# Patient Record
Sex: MALE | Race: WHITE | NOT HISPANIC OR LATINO | Employment: UNEMPLOYED | URBAN - METROPOLITAN AREA
[De-identification: names, ages, dates, MRNs, and addresses within clinical notes are randomized per-mention and may not be internally consistent; named-entity substitution may affect disease eponyms.]

---

## 2023-09-04 PROBLEM — R51.9 HEADACHE: Status: ACTIVE | Noted: 2023-09-04

## 2023-09-04 PROBLEM — L73.9 FOLLICULITIS: Status: ACTIVE | Noted: 2023-09-04

## 2023-09-04 PROBLEM — L03.90 CELLULITIS: Status: ACTIVE | Noted: 2023-09-04

## 2023-09-04 PROBLEM — K40.90 RIGHT INGUINAL HERNIA: Status: ACTIVE | Noted: 2023-09-04

## 2023-09-04 PROBLEM — M25.551 RIGHT HIP PAIN: Status: ACTIVE | Noted: 2023-09-04

## 2023-09-04 PROBLEM — E89.0 POSTPROCEDURAL HYPOTHYROIDISM: Status: ACTIVE | Noted: 2023-09-04

## 2023-09-04 PROBLEM — I50.9 CONGESTIVE HEART FAILURE (MULTI): Status: ACTIVE | Noted: 2023-09-04

## 2023-09-04 PROBLEM — F41.1 GENERALIZED ANXIETY DISORDER: Status: ACTIVE | Noted: 2023-09-04

## 2023-09-04 PROBLEM — F10.929 ALCOHOL INTOXICATION (CMS-HCC): Status: ACTIVE | Noted: 2023-09-04

## 2023-09-04 PROBLEM — G47.00 INSOMNIA: Status: ACTIVE | Noted: 2023-09-04

## 2023-09-04 PROBLEM — E03.9 HYPOTHYROIDISM: Status: ACTIVE | Noted: 2023-09-04

## 2023-09-04 PROBLEM — I10 HYPERTENSION: Status: ACTIVE | Noted: 2023-09-04

## 2023-09-04 PROBLEM — M54.12 RIGHT CERVICAL RADICULOPATHY: Status: ACTIVE | Noted: 2023-09-04

## 2023-09-04 PROBLEM — F41.9 ANXIETY: Status: ACTIVE | Noted: 2023-09-04

## 2023-09-04 PROBLEM — R10.9 ABDOMINAL PAIN: Status: ACTIVE | Noted: 2023-09-04

## 2023-09-04 PROBLEM — F17.200 TOBACCO DEPENDENCE SYNDROME: Status: ACTIVE | Noted: 2023-09-04

## 2023-09-04 PROBLEM — M54.50 CHRONIC LOWER BACK PAIN: Status: ACTIVE | Noted: 2023-09-04

## 2023-09-04 PROBLEM — G89.29 CHRONIC PAIN: Status: ACTIVE | Noted: 2023-09-04

## 2023-09-04 PROBLEM — I10 BENIGN ESSENTIAL HYPERTENSION: Status: ACTIVE | Noted: 2023-09-04

## 2023-09-04 PROBLEM — K52.9 COLITIS: Status: ACTIVE | Noted: 2023-09-04

## 2023-09-04 PROBLEM — R40.1 CLOUDED CONSCIOUSNESS: Status: ACTIVE | Noted: 2023-09-04

## 2023-09-04 PROBLEM — G89.29 CHRONIC LOWER BACK PAIN: Status: ACTIVE | Noted: 2023-09-04

## 2023-09-04 PROBLEM — C73 THYROID CARCINOMA (MULTI): Status: ACTIVE | Noted: 2023-09-04

## 2023-09-04 PROBLEM — F10.10 ETOH ABUSE: Status: ACTIVE | Noted: 2023-09-04

## 2023-09-04 PROBLEM — L03.119 CELLULITIS OF LOWER LEG: Status: ACTIVE | Noted: 2023-09-04

## 2023-09-04 PROBLEM — I50.22 CHRONIC SYSTOLIC HEART FAILURE (MULTI): Status: ACTIVE | Noted: 2023-09-04

## 2023-09-04 PROBLEM — F32.A DEPRESSION: Status: ACTIVE | Noted: 2023-09-04

## 2023-09-04 PROBLEM — I42.8 NONISCHEMIC CARDIOMYOPATHY (MULTI): Status: ACTIVE | Noted: 2023-09-04

## 2023-09-04 RX ORDER — POTASSIUM CHLORIDE 750 MG/1
1 TABLET, FILM COATED, EXTENDED RELEASE ORAL 2 TIMES DAILY
COMMUNITY
End: 2023-10-17 | Stop reason: SDUPTHER

## 2023-09-04 RX ORDER — METOPROLOL TARTRATE 50 MG/1
1 TABLET ORAL EVERY 12 HOURS
COMMUNITY
Start: 2012-09-30 | End: 2023-10-17 | Stop reason: WASHOUT

## 2023-09-04 RX ORDER — LEVOTHYROXINE SODIUM 150 UG/1
1 TABLET ORAL
COMMUNITY
End: 2023-10-18 | Stop reason: SDUPTHER

## 2023-09-04 RX ORDER — SERTRALINE HYDROCHLORIDE 50 MG/1
1 TABLET, FILM COATED ORAL DAILY
COMMUNITY
Start: 2018-10-16 | End: 2023-10-17 | Stop reason: WASHOUT

## 2023-09-04 RX ORDER — CARVEDILOL 3.12 MG/1
1 TABLET ORAL
COMMUNITY
End: 2023-10-17 | Stop reason: WASHOUT

## 2023-09-04 RX ORDER — VENLAFAXINE HYDROCHLORIDE 37.5 MG/1
1 CAPSULE, EXTENDED RELEASE ORAL DAILY
COMMUNITY
Start: 2023-04-06 | End: 2023-10-17 | Stop reason: SDUPTHER

## 2023-09-04 RX ORDER — LISINOPRIL 20 MG/1
2 TABLET ORAL DAILY
COMMUNITY
Start: 2014-09-26 | End: 2023-10-17 | Stop reason: WASHOUT

## 2023-09-04 RX ORDER — HYDROCHLOROTHIAZIDE 25 MG/1
1 TABLET ORAL DAILY
COMMUNITY
Start: 2016-10-25 | End: 2023-10-17 | Stop reason: WASHOUT

## 2023-09-04 RX ORDER — ACETAMINOPHEN 500 MG
1 TABLET ORAL DAILY
COMMUNITY

## 2023-09-04 RX ORDER — ACETAMINOPHEN, DIPHENHYDRAMINE HCL, PHENYLEPHRINE HCL 325; 25; 5 MG/1; MG/1; MG/1
TABLET ORAL NIGHTLY
COMMUNITY
End: 2023-12-21 | Stop reason: WASHOUT

## 2023-09-04 RX ORDER — FUROSEMIDE 40 MG/1
1 TABLET ORAL DAILY
COMMUNITY
End: 2023-10-17 | Stop reason: SDUPTHER

## 2023-09-04 RX ORDER — SACUBITRIL AND VALSARTAN 24; 26 MG/1; MG/1
1 TABLET, FILM COATED ORAL 2 TIMES DAILY
COMMUNITY
End: 2023-10-17 | Stop reason: WASHOUT

## 2023-09-04 RX ORDER — MULTIVITAMIN
1 TABLET ORAL DAILY
COMMUNITY
End: 2023-12-21 | Stop reason: WASHOUT

## 2023-10-05 ENCOUNTER — PHARMACY VISIT (OUTPATIENT)
Dept: PHARMACY | Facility: CLINIC | Age: 58
End: 2023-10-05
Payer: MEDICAID

## 2023-10-05 PROCEDURE — RXMED WILLOW AMBULATORY MEDICATION CHARGE

## 2023-10-05 RX ORDER — ALPRAZOLAM 0.25 MG/1
TABLET ORAL
Qty: 14 TABLET | Refills: 0 | OUTPATIENT
Start: 2023-10-05 | End: 2023-10-17 | Stop reason: SDUPTHER

## 2023-10-05 RX ORDER — VENLAFAXINE HYDROCHLORIDE 75 MG/1
CAPSULE, EXTENDED RELEASE ORAL
Qty: 30 CAPSULE | Refills: 2 | OUTPATIENT
Start: 2023-10-05 | End: 2023-10-17 | Stop reason: SDUPTHER

## 2023-10-05 RX ORDER — VENLAFAXINE HYDROCHLORIDE 37.5 MG/1
CAPSULE, EXTENDED RELEASE ORAL
Qty: 30 CAPSULE | Refills: 2 | OUTPATIENT
Start: 2023-10-05 | End: 2023-10-17 | Stop reason: WASHOUT

## 2023-10-05 RX ORDER — ALPRAZOLAM 0.5 MG/1
TABLET ORAL
Qty: 90 TABLET | Refills: 0 | OUTPATIENT
Start: 2023-10-05 | End: 2023-10-17 | Stop reason: SDUPTHER

## 2023-10-09 ENCOUNTER — PHARMACY VISIT (OUTPATIENT)
Dept: PHARMACY | Facility: CLINIC | Age: 58
End: 2023-10-09
Payer: MEDICAID

## 2023-10-09 PROCEDURE — RXMED WILLOW AMBULATORY MEDICATION CHARGE

## 2023-10-16 NOTE — ASSESSMENT & PLAN NOTE
- We will check thyroid level blood work ordered today  -Continue on current regimen pending blood work results to which we will make any adjustments if warranted

## 2023-10-16 NOTE — ASSESSMENT & PLAN NOTE
>>ASSESSMENT AND PLAN FOR HYPOTHYROIDISM WRITTEN ON 10/16/2023  7:57 AM BY SANTOS POWELL DO    - We will check thyroid level blood work ordered today  -Continue on current regimen pending blood work results to which we will make any adjustments if warranted

## 2023-10-16 NOTE — ASSESSMENT & PLAN NOTE
>>ASSESSMENT AND PLAN FOR BENIGN ESSENTIAL HYPERTENSION WRITTEN ON 10/16/2023  7:58 AM BY SANTOS POWELL DO    - Blood pressure stable in office today

## 2023-10-16 NOTE — PROGRESS NOTES
Outpatient Visit Note    Chief Complaint   Patient presents with    Thyroid Problem     Check thyroid. Pt would like flu shot.         HPI:  Todd Small is a 58 y.o. male with a complex past medical history significant for CHF, nonischemic cardiomyopathy, hypertension, alcohol abuse, anxiety and postprocedural hypothyroidism who presents to the office for thyroid follow-up.  He was last seen in the office on 8/17/2023 secondary to concerns regarding ear infection.     In review, at last encounter he reported 3 weeks of severe left-sided ear pain. Was in [place] over the last month to which he did present to a local physician for evaluation on 8/5/2023. Was ultimately started on regimen of Augmentin and Ciprodex drops. States that symptoms seem to momentarily improve though upon completing prescriptions within the last week, continues to have moderate to severe pain extending from ear to middle area of his forehead. Denies any active ear swelling or discharge though pain remains extremely prominent and sensitive to touch. Did have moderate dizziness which has slightly improved. Denies any falls or syncope. Has had mild postnasal drainage with some coughing and green production. Denies any fever, chills. Has had decreased appetite though denies nausea, vomiting or diarrhea. Patient has been struggling with the pain which has created significant irritability/sleep disruption. Has been attempting to optimize Tylenol without significant relief. Has used opioid pain relievers in the past stating that tramadol does not have any effect. Does have leftover prescription for oxycodone though has been worried about this being too potent as he does take chronic alprazolam through psychiatry. Was interested in alternative medication in small quantity of possible.  Ultimately patient was started on doxycycline and Medrol pack with small prescription of as needed hydrocodone given along with ENT referral.  He has  had to have ENT follow-up in the near future.  Has cleaned significant amounts of wax otherwise here denying any active hearing issues or recurrent pain issues.  Even with symptoms resolving, would like to have formal evaluation with specialist.    In review of chart, patient was last seen by Dr. Bush of cardiology in June 2023 to which he is scheduled for routine follow-up in December.    Acquired hypothyroidism:  Last thyroid level checked in February which was stable on current regimen of Synthroid 150 mcg Monday through Saturday with 1.5 tablets on Sunday.  He reports compliance with medication regimen denying any adverse side effects.  No significant complaints of palpitations, fatigue, mood fluctuations or hair/skin/nail concerns.  As noted steady weight gain over the last several months, leaving him suspicious of underactive thyroid.    Of note, patient has had good response to topical Voltaren gel for musculoskeletal aches and pains.  Has been recently dealing with low back aggravation to which she would like to utilize this product though prescription refill as needed.        Current Medications  Current Outpatient Medications   Medication Instructions    ALPRAZolam (Xanax) 0.25 mg tablet TAKE 1 TABLET BY MOUTH EVERY EVENING FOR BREAKTHROUGH ANXIETY DURING TRAVELING    ALPRAZolam (Xanax) 0.5 mg tablet TAKE 1 TABLET BY MOUTH THREE TIMES A DAY FOR ANXIETY AND SLEEP DISTURBANCE    carvedilol (Coreg) 12.5 mg tablet TAKE 1 TABLET BY MOUTH TWO TIMES A DAY WITH FOOD    cetirizine (ZyrTEC) 10 mg tablet TAKE 1 TABLET BY MOUTH ONE TIME DAILY    cholecalciferol (Vitamin D-3) 50 mcg (2,000 unit) capsule 1 capsule, oral, Daily    diclofenac sodium (Voltaren) 1 % gel gel 1 Application, Topical, 4 times daily PRN    diclofenac sodium 1 % kit 1 Application, Topical, Every 12 hours, for 30 day(s)    furosemide (Lasix) 40 mg tablet TAKE ONE TABLET BY MOUTH ONCE A DAY    hydroCHLOROthiazide (HYDRODiuril) 25 mg tablet 1  tablet, oral, Daily    levothyroxine (Synthroid) 150 mcg tablet 1 tablet, oral, MONDAY through SATURDAY, and take 1 AND 1/2 tablets on SUNDAY Orally as directed<BR>    lisinopril 20 mg tablet 2 tablets, oral, Daily    melatonin 10 mg tablet oral, Nightly, As directed    methylPREDNISolone (Medrol Dospak) 4 mg tablets TAKE 6 TABLETS BY MOUTH ON DAY #1, 5 TABLETS ON DAY #2, 4 TABLETS ON DAY #3, 3 TABLETS ON DAY #4, 2 TABLETS ON DAY #5, 1 TABLET ON DAY #6, THEN STOP    metoprolol tartrate (Lopressor) 50 mg tablet 1 tablet, oral, Every 12 hours    multivitamin (Multiple Vitamins) tablet 1 tablet, oral, Daily    potassium chloride CR (Klor-Con) 10 mEq ER tablet TAKE ONE TABLET BY MOUTH WITH FOOD TWICE A DAY    sacubitriL-valsartan (Entresto) 24-26 mg tablet 1 tablet, oral, 2 times daily    sacubitriL-valsartan (Entresto) 49-51 mg tablet TAKE 1 TABLET BY MOUTH TWICE DAILLY    sertraline (Zoloft) 50 mg tablet 1 tablet, oral, Daily, As directed    venlafaxine XR (Effexor-XR) 75 mg 24 hr capsule TAKE 1 CAPSULE BY MOUTH ONE TIME DAILY        Allergies  Allergies   Allergen Reactions    Latex Rash    Sulfa (Sulfonamide Antibiotics) Rash and Unknown        Past Medical History:   Diagnosis Date    Anxiety 4/01/23    Cancer (CMS/[facility]) 10/01/0-    CHF (congestive heart failure) (CMS/[facility]) 10/01/23    Depression 10/01/23      Past Surgical History:   Procedure Laterality Date    APPENDECTOMY  10/01/96     Family History   Problem Relation Name Age of Onset    Heart disease Mother      Heart disease Sister (1)     Other (pacemaker) Sister (1)     Heart disease Brother      Atrial fibrillation Brother       Social History     Tobacco Use    Smoking status: Former     Packs/day: 1.00     Years: 15.00     Additional pack years: 0.00     Total pack years: 15.00     Types: Cigarettes    Smokeless tobacco: Never   Substance Use Topics    Alcohol use: Not Currently    Drug use: Never       ROS  All pertinent positive symptoms are included  in the history of present illness.  All other systems have been reviewed and are negative and noncontributory to this patient's current ailments.    VITAL SIGNS  Vitals:    10/17/23 0951   BP: 126/68   Pulse: 79   Temp: 36.9 °C (98.4 °F)   SpO2: 97%       PHYSICAL EXAM  GENERAL APPEARANCE: alert and oriented, Pleasant and cooperative, No Acute Distress  HEENT: EOMI, PERRLA, MMM, right external auditory canal free from any significant wax obstruction  HEART: RRR, normal S1S2, no murmurs, click or rubs  LUNGS: clear to auscultation bilaterally, no wheezes/rhonchi/rales  EXTREMITIES: no edema, normal ROM  SKIN: normal, no rash, unremarkable  NEUROLOGIC EXAM: non-focal exam  MUSCULOSKELETAL: no gross abnormalities  PSYCH: affect is normal, eye contact is good    Assessment/Plan   Problem List Items Addressed This Visit             ICD-10-CM    Chronic systolic heart failure (CMS/HCC) I50.22     - Continue with plans for routine cardiology follow-up in December         Benign essential hypertension I10     - Blood pressure stable in office today         Relevant Orders    Comprehensive metabolic panel (Completed)    Tsh With Reflex To Free T4 If Abnormal (Completed)    Lipid panel (Completed)    Chronic lower back pain M54.50, G89.29    Relevant Medications    diclofenac sodium (Voltaren) 1 % gel gel    Hypothyroidism - Primary E03.9     - We will check thyroid level blood work ordered today  -Continue on current regimen pending blood work results to which we will make any adjustments if warranted         Relevant Orders    Comprehensive metabolic panel (Completed)    Tsh With Reflex To Free T4 If Abnormal (Completed)    Lipid panel (Completed)     Other Visit Diagnoses         Codes    Encounter for immunization     Z23    Relevant Orders    Flu vaccine (IIV4) age 6 months and greater, preservative free (Completed)

## 2023-10-17 ENCOUNTER — LAB (OUTPATIENT)
Dept: LAB | Facility: LAB | Age: 58
End: 2023-10-17
Payer: COMMERCIAL

## 2023-10-17 ENCOUNTER — OFFICE VISIT (OUTPATIENT)
Dept: PRIMARY CARE | Facility: CLINIC | Age: 58
End: 2023-10-17
Payer: COMMERCIAL

## 2023-10-17 ENCOUNTER — PHARMACY VISIT (OUTPATIENT)
Dept: PHARMACY | Facility: CLINIC | Age: 58
End: 2023-10-17
Payer: MEDICAID

## 2023-10-17 VITALS
SYSTOLIC BLOOD PRESSURE: 126 MMHG | HEART RATE: 79 BPM | BODY MASS INDEX: 30.74 KG/M2 | TEMPERATURE: 98.4 F | HEIGHT: 71 IN | OXYGEN SATURATION: 97 % | WEIGHT: 219.6 LBS | DIASTOLIC BLOOD PRESSURE: 68 MMHG

## 2023-10-17 DIAGNOSIS — G89.29 CHRONIC BILATERAL LOW BACK PAIN, UNSPECIFIED WHETHER SCIATICA PRESENT: ICD-10-CM

## 2023-10-17 DIAGNOSIS — Z23 ENCOUNTER FOR IMMUNIZATION: ICD-10-CM

## 2023-10-17 DIAGNOSIS — E03.9 ACQUIRED HYPOTHYROIDISM: Primary | ICD-10-CM

## 2023-10-17 DIAGNOSIS — I50.22 CHRONIC SYSTOLIC HEART FAILURE (MULTI): ICD-10-CM

## 2023-10-17 DIAGNOSIS — M54.50 CHRONIC BILATERAL LOW BACK PAIN, UNSPECIFIED WHETHER SCIATICA PRESENT: ICD-10-CM

## 2023-10-17 DIAGNOSIS — E03.9 ACQUIRED HYPOTHYROIDISM: ICD-10-CM

## 2023-10-17 DIAGNOSIS — I10 BENIGN ESSENTIAL HYPERTENSION: ICD-10-CM

## 2023-10-17 LAB
ALBUMIN SERPL-MCNC: 4.2 G/DL (ref 3.5–5)
ALP BLD-CCNC: 49 U/L (ref 35–125)
ALT SERPL-CCNC: 13 U/L (ref 5–40)
ANION GAP SERPL CALC-SCNC: 9 MMOL/L
AST SERPL-CCNC: 16 U/L (ref 5–40)
BILIRUB SERPL-MCNC: 0.2 MG/DL (ref 0.1–1.2)
BUN SERPL-MCNC: 20 MG/DL (ref 8–25)
CALCIUM SERPL-MCNC: 9.1 MG/DL (ref 8.5–10.4)
CHLORIDE SERPL-SCNC: 100 MMOL/L (ref 97–107)
CHOLEST SERPL-MCNC: 202 MG/DL (ref 133–200)
CHOLEST/HDLC SERPL: 4.6 {RATIO}
CO2 SERPL-SCNC: 28 MMOL/L (ref 24–31)
CREAT SERPL-MCNC: 1.2 MG/DL (ref 0.4–1.6)
GFR SERPL CREATININE-BSD FRML MDRD: 70 ML/MIN/1.73M*2
GLUCOSE SERPL-MCNC: 115 MG/DL (ref 65–99)
HDLC SERPL-MCNC: 44 MG/DL
LDLC SERPL CALC-MCNC: 125 MG/DL (ref 65–130)
POTASSIUM SERPL-SCNC: 4.8 MMOL/L (ref 3.4–5.1)
PROT SERPL-MCNC: 6.6 G/DL (ref 5.9–7.9)
SODIUM SERPL-SCNC: 137 MMOL/L (ref 133–145)
TRIGL SERPL-MCNC: 165 MG/DL (ref 40–150)
TSH SERPL DL<=0.05 MIU/L-ACNC: 1.32 MIU/L (ref 0.27–4.2)

## 2023-10-17 PROCEDURE — 3078F DIAST BP <80 MM HG: CPT | Performed by: FAMILY MEDICINE

## 2023-10-17 PROCEDURE — 80061 LIPID PANEL: CPT

## 2023-10-17 PROCEDURE — 90471 IMMUNIZATION ADMIN: CPT | Performed by: FAMILY MEDICINE

## 2023-10-17 PROCEDURE — RXMED WILLOW AMBULATORY MEDICATION CHARGE

## 2023-10-17 PROCEDURE — 1036F TOBACCO NON-USER: CPT | Performed by: FAMILY MEDICINE

## 2023-10-17 PROCEDURE — 99214 OFFICE O/P EST MOD 30 MIN: CPT | Performed by: FAMILY MEDICINE

## 2023-10-17 PROCEDURE — 84443 ASSAY THYROID STIM HORMONE: CPT

## 2023-10-17 PROCEDURE — 80053 COMPREHEN METABOLIC PANEL: CPT

## 2023-10-17 PROCEDURE — 99214 OFFICE O/P EST MOD 30 MIN: CPT | Mod: 25 | Performed by: FAMILY MEDICINE

## 2023-10-17 PROCEDURE — 3074F SYST BP LT 130 MM HG: CPT | Performed by: FAMILY MEDICINE

## 2023-10-17 PROCEDURE — 36415 COLL VENOUS BLD VENIPUNCTURE: CPT

## 2023-10-17 RX ORDER — DICLOFENAC SODIUM 10 MG/G
4 GEL TOPICAL 4 TIMES DAILY PRN
Qty: 100 G | Refills: 3 | Status: SHIPPED | OUTPATIENT
Start: 2023-10-17

## 2023-10-17 RX ORDER — VENLAFAXINE HYDROCHLORIDE 37.5 MG/1
CAPSULE, EXTENDED RELEASE ORAL
Qty: 30 CAPSULE | Refills: 2 | Status: CANCELLED | OUTPATIENT
Start: 2023-10-17 | End: 2023-10-29

## 2023-10-17 ASSESSMENT — PAIN SCALES - GENERAL: PAINLEVEL: 0-NO PAIN

## 2023-10-17 NOTE — PATIENT INSTRUCTIONS
Problem List Items Addressed This Visit             ICD-10-CM    Chronic systolic heart failure (CMS/HCC) I50.22     - Continue with plans for routine cardiology follow-up in December         Benign essential hypertension I10     - Blood pressure stable in office today         Relevant Orders    Comprehensive metabolic panel (Completed)    Tsh With Reflex To Free T4 If Abnormal (Completed)    Lipid panel (Completed)    Chronic lower back pain M54.50, G89.29    Relevant Medications    diclofenac sodium (Voltaren) 1 % gel gel    Hypothyroidism - Primary E03.9     - We will check thyroid level blood work ordered today  -Continue on current regimen pending blood work results to which we will make any adjustments if warranted         Relevant Orders    Comprehensive metabolic panel (Completed)    Tsh With Reflex To Free T4 If Abnormal (Completed)    Lipid panel (Completed)     Other Visit Diagnoses         Codes    Encounter for immunization     Z23    Relevant Orders    Flu vaccine (IIV4) age 6 months and greater, preservative free (Completed)

## 2023-10-18 ENCOUNTER — PHARMACY VISIT (OUTPATIENT)
Dept: PHARMACY | Facility: CLINIC | Age: 58
End: 2023-10-18
Payer: MEDICAID

## 2023-10-18 ENCOUNTER — TELEPHONE (OUTPATIENT)
Dept: PRIMARY CARE | Facility: CLINIC | Age: 58
End: 2023-10-18
Payer: COMMERCIAL

## 2023-10-18 DIAGNOSIS — E03.9 ACQUIRED HYPOTHYROIDISM: Primary | ICD-10-CM

## 2023-10-18 PROCEDURE — RXMED WILLOW AMBULATORY MEDICATION CHARGE

## 2023-10-18 RX ORDER — LEVOTHYROXINE SODIUM 150 UG/1
150 TABLET ORAL SEE ADMIN INSTRUCTIONS
Qty: 96 TABLET | Refills: 1 | Status: SHIPPED | OUTPATIENT
Start: 2023-10-18 | End: 2024-03-25 | Stop reason: SDUPTHER

## 2023-10-19 ENCOUNTER — PHARMACY VISIT (OUTPATIENT)
Dept: PHARMACY | Facility: CLINIC | Age: 58
End: 2023-10-19
Payer: MEDICAID

## 2023-10-19 ENCOUNTER — TELEPHONE (OUTPATIENT)
Dept: PRIMARY CARE | Facility: CLINIC | Age: 58
End: 2023-10-19
Payer: COMMERCIAL

## 2023-10-19 ENCOUNTER — TELEPHONE (OUTPATIENT)
Dept: ENDOCRINOLOGY | Facility: CLINIC | Age: 58
End: 2023-10-19
Payer: COMMERCIAL

## 2023-10-19 DIAGNOSIS — F41.9 ANXIETY: Primary | ICD-10-CM

## 2023-10-19 PROCEDURE — RXMED WILLOW AMBULATORY MEDICATION CHARGE

## 2023-10-19 RX ORDER — VENLAFAXINE 37.5 MG/1
37.5 TABLET ORAL DAILY
Qty: 90 TABLET | Refills: 1 | Status: SHIPPED | OUTPATIENT
Start: 2023-10-19 | End: 2023-12-05

## 2023-10-19 RX ORDER — VENLAFAXINE HYDROCHLORIDE 37.5 MG/1
37.5 CAPSULE, EXTENDED RELEASE ORAL DAILY
Qty: 30 CAPSULE | Refills: 2 | OUTPATIENT
Start: 2023-10-19 | End: 2023-12-05

## 2023-10-19 NOTE — TELEPHONE ENCOUNTER
Todd Small   1965   [id number]   [phone]     Called and spoke to patient in regards to scheduling an appt.

## 2023-10-19 NOTE — TELEPHONE ENCOUNTER
Todd LOCKE Geovanny   1965   [id number]   [phone]       Patient called and wanted you to know that he weights 218lbs and was 145lbs at last visit in March 2022 with you. Patient wanted to know if synthroid can be increased. Staff message sent to provider.

## 2023-10-19 NOTE — TELEPHONE ENCOUNTER
ECW was reviewed showing that I sent a prescription for generic Effexor 37.5 mg in April for 90-day supply.  At this time it does not seem that he has any additional refills nor would he have had enough to cover him up to this last appointment.  As he is seeking prescription refill I will send another 90-day prescription at this time, with a refill covering for 6 months.  Per chart it does seem that he takes this along with 75 mg which she can continue to do so

## 2023-10-20 ENCOUNTER — PHARMACY VISIT (OUTPATIENT)
Dept: PHARMACY | Facility: CLINIC | Age: 58
End: 2023-10-20
Payer: MEDICAID

## 2023-10-20 PROCEDURE — RXMED WILLOW AMBULATORY MEDICATION CHARGE

## 2023-10-20 RX ORDER — ALPRAZOLAM 0.25 MG/1
TABLET ORAL
Qty: 30 TABLET | Refills: 0 | OUTPATIENT
Start: 2023-10-19 | End: 2023-11-07 | Stop reason: SDUPTHER

## 2023-10-20 RX ORDER — ALPRAZOLAM 0.25 MG/1
TABLET ORAL
Qty: 14 TABLET | Refills: 0 | Status: CANCELLED | OUTPATIENT
Start: 2023-10-20 | End: 2024-04-17

## 2023-10-26 ASSESSMENT — ENCOUNTER SYMPTOMS
VOMITING: 0
COUGH: 0
DIARRHEA: 0
ABDOMINAL PAIN: 0
HEADACHES: 0
SORE THROAT: 0
NECK PAIN: 0
RHINORRHEA: 0

## 2023-11-01 ENCOUNTER — PHARMACY VISIT (OUTPATIENT)
Dept: PHARMACY | Facility: CLINIC | Age: 58
End: 2023-11-01
Payer: MEDICAID

## 2023-11-01 PROCEDURE — RXMED WILLOW AMBULATORY MEDICATION CHARGE

## 2023-11-02 ENCOUNTER — OFFICE VISIT (OUTPATIENT)
Dept: OTOLARYNGOLOGY | Facility: CLINIC | Age: 58
End: 2023-11-02
Payer: COMMERCIAL

## 2023-11-02 VITALS — HEIGHT: 71 IN | BODY MASS INDEX: 30.66 KG/M2 | WEIGHT: 219 LBS | TEMPERATURE: 96.8 F

## 2023-11-02 DIAGNOSIS — H92.09 OTALGIA, UNSPECIFIED LATERALITY: Primary | ICD-10-CM

## 2023-11-02 PROCEDURE — 1036F TOBACCO NON-USER: CPT | Performed by: OTOLARYNGOLOGY

## 2023-11-02 PROCEDURE — 3078F DIAST BP <80 MM HG: CPT | Performed by: OTOLARYNGOLOGY

## 2023-11-02 PROCEDURE — 99203 OFFICE O/P NEW LOW 30 MIN: CPT | Performed by: OTOLARYNGOLOGY

## 2023-11-02 PROCEDURE — 3074F SYST BP LT 130 MM HG: CPT | Performed by: OTOLARYNGOLOGY

## 2023-11-02 NOTE — PROGRESS NOTES
SHANTHI Small is a 58 y.o. male developed left greater than right otalgia around August.  Treated with a course of Augmentin and eardrops which did not help.  Then he was treated with doxycycline and steroid pack and feels much better now.  He is without any pain, hearing loss, otorrhea at this point.      Past Medical History:   Diagnosis Date    Anxiety 4/01/23    Cancer (CMS/[facility]) 10/01/0-    CHF (congestive heart failure) (CMS/[facility]) 10/01/23    Depression 10/01/23            Medications:     Current Outpatient Medications:     ALPRAZolam (Xanax) 0.25 mg tablet, Take 1 oral tablet every evening for breakthrough anxiety and during traveling, Disp: 30 tablet, Rfl: 0    ALPRAZolam (Xanax) 0.5 mg tablet, TAKE 1 TABLET BY MOUTH THREE TIMES A DAY FOR ANXIETY AND SLEEP DISTURBANCE, Disp: 90 tablet, Rfl: 0    carvedilol (Coreg) 12.5 mg tablet, TAKE 1 TABLET BY MOUTH TWO TIMES A DAY WITH FOOD, Disp: 180 tablet, Rfl: 3    cetirizine (ZyrTEC) 10 mg tablet, TAKE 1 TABLET BY MOUTH ONE TIME DAILY, Disp: 30 tablet, Rfl: 0    diclofenac sodium (Voltaren) 1 % gel gel, Apply 1 Application topically 4 times a day as needed (as needed for pain)., Disp: 100 g, Rfl: 3    furosemide (Lasix) 40 mg tablet, TAKE ONE TABLET BY MOUTH ONCE A DAY, Disp: 90 tablet, Rfl: 3    levothyroxine (Synthroid) 150 mcg tablet, Take 1 tablet (150 mcg) by mouth see administration instructions. MONDAY through SATURDAY, and take 1 AND 1/2 tablets on SUNDAY Orally as directed, Disp: 96 tablet, Rfl: 1    melatonin 10 mg tablet, Take by mouth once daily at bedtime. As directed, Disp: , Rfl:     potassium chloride CR (Klor-Con) 10 mEq ER tablet, TAKE ONE TABLET BY MOUTH WITH FOOD TWICE A DAY, Disp: 180 tablet, Rfl: 2    sacubitriL-valsartan (Entresto) 49-51 mg tablet, TAKE 1 TABLET BY MOUTH TWICE DAILLY, Disp: 180 tablet, Rfl: 2    venlafaxine (Effexor) 37.5 mg tablet, Take 1 tablet (37.5 mg) by mouth once daily., Disp: 90 tablet, Rfl: 1    venlafaxine XR  "(Effexor-XR) 75 mg 24 hr capsule, TAKE 1 CAPSULE BY MOUTH ONE TIME DAILY, Disp: 30 capsule, Rfl: 2    ALPRAZolam (Xanax) 0.25 mg tablet, TAKE 1 TABLET BY MOUTH EVERY EVENING FOR BREAKTHROUGH ANXIETY DURING TRAVELING (Patient not taking: Reported on 11/2/2023), Disp: 14 tablet, Rfl: 0    cholecalciferol (Vitamin D-3) 50 mcg (2,000 unit) capsule, Take 1 capsule (2,000 Units) by mouth early in the morning.., Disp: , Rfl:     diclofenac sodium 1 % kit, Apply 1 Application topically every 12 hours. for 30 day(s), Disp: , Rfl:     multivitamin (Multiple Vitamins) tablet, Take 1 tablet by mouth once daily., Disp: , Rfl:     venlafaxine XR (Effexor-XR) 37.5 mg 24 hr capsule, Take 1 capsule by mouth once daily (Patient not taking: Reported on 11/2/2023), Disp: 30 capsule, Rfl: 2     Allergies:  Allergies   Allergen Reactions    Latex Rash    Sulfa (Sulfonamide Antibiotics) Rash and Unknown        Physical Exam:  Last Recorded Vitals  Temperature 36 °C (96.8 °F), height 1.803 m (5' 11\"), weight 99.3 kg (219 lb).  General:     General appearance: Well-developed, well-nourished in no acute distress.       Voice:  normal       Head/face: Normal appearance; nontender to palpation     Facial nerve function: Normal and symmetric bilaterally.    Oral/oropharynx:     Oral vestibule: Normal labial and gingival mucosa     Tongue/floor of mouth: Normal without lesion     Oropharynx: Clear.  No lesions present of the hard/soft palate, posterior pharynx    Neck:     Neck: Normal appearance, trachea midline     Salivary glands: Normal to palpation bilaterally     Lymph nodes: No cervical lymphadenopathy to palpation     Thyroid: No thyromegaly.  No palpable nodules.  Hidden, well healed thyroidectomy scar     Range of motion: Normal    Neurological:     Cortical functions: Alert and oriented x3, appropriate affect       Larynx/hypopharynx:     Laryngeal findings: Mirror exam inadequate or limited secondary to enlarged base of tongue and/or " excessive gagging    Ear:     Ear canal: Normal bilaterally     Tympanic membrane: Intact and mobile bilaterally     Pinna: Normal bilaterally     Hearing:  Gross hearing assessment normal by voice    Nose:     Visualized using: Anterior rhinoscopy     Nasopharynx: Inadequate mirror exam secondary to gag, anatomy.       Nasal dorsum: Nontraumatic midline appearance     Septum: Midline     Inferior turbinates: Normally sized     Mucosa: Bilateral, pink, normal appearing       Assessment/Plan   He was reassured that his ears look healthy today.  No further medication is required.  I will see him back in any point as needed with recurrence         Chema Miller MD

## 2023-11-06 ENCOUNTER — PHARMACY VISIT (OUTPATIENT)
Dept: PHARMACY | Facility: CLINIC | Age: 58
End: 2023-11-06
Payer: MEDICAID

## 2023-11-06 PROCEDURE — RXMED WILLOW AMBULATORY MEDICATION CHARGE

## 2023-11-06 RX ORDER — CETIRIZINE HYDROCHLORIDE 10 MG/1
10 TABLET ORAL DAILY
Qty: 30 TABLET | Refills: 0 | Status: CANCELLED | OUTPATIENT
Start: 2023-11-06 | End: 2024-11-05

## 2023-11-07 ENCOUNTER — PHARMACY VISIT (OUTPATIENT)
Dept: PHARMACY | Facility: CLINIC | Age: 58
End: 2023-11-07
Payer: MEDICAID

## 2023-11-07 PROCEDURE — RXMED WILLOW AMBULATORY MEDICATION CHARGE

## 2023-11-07 RX ORDER — ALPRAZOLAM 0.25 MG/1
TABLET ORAL
Qty: 30 TABLET | Refills: 0 | OUTPATIENT
Start: 2023-11-07 | End: 2023-12-05

## 2023-11-07 RX ORDER — ALPRAZOLAM 0.5 MG/1
TABLET ORAL
Qty: 90 TABLET | Refills: 0 | OUTPATIENT
Start: 2023-11-07 | End: 2023-12-05 | Stop reason: SDUPTHER

## 2023-12-03 NOTE — PROGRESS NOTES
HPI   57yo of Dr. Hernandez's practice for thyroid yearly follow up.     -papillary thyroid cancer 0.8 and 1.5cm without invasive qualities in 2004 s/p surgery and 52mCi I 131 ablation.   Negative thyrogen scan in jan 2013, tg has been undetectable.   Taking synthroid 150mcg 7.5 pills/day use pill container.           Since last visit:  -saw once in 3/22, pt never repeat labs, he was depressed at the time having a lot of gi sx  -no obstructive sx  -euthyroid  -feeling depressed over his health      Current Outpatient Medications:     ALPRAZolam (Xanax) 0.5 mg tablet, TAKE 1 TABLET BY MOUTH THREE TIMES A DAY FOR ANXIETY AND SLEEP DISTURBANCE, Disp: 90 tablet, Rfl: 0    ALPRAZolam (Xanax) 0.5 mg tablet, Take 1 & 1/2 tablets by mouth 3 times a day, Disp: 135 tablet, Rfl: 1    carvedilol (Coreg) 12.5 mg tablet, TAKE 1 TABLET BY MOUTH TWO TIMES A DAY WITH FOOD, Disp: 180 tablet, Rfl: 3    cetirizine (ZyrTEC) 10 mg tablet, TAKE 1 TABLET BY MOUTH ONE TIME DAILY, Disp: 30 tablet, Rfl: 0    cholecalciferol (Vitamin D-3) 50 mcg (2,000 unit) capsule, Take 1 capsule (2,000 Units) by mouth early in the morning.., Disp: , Rfl:     diclofenac sodium (Voltaren) 1 % gel gel, Apply 1 Application topically 4 times a day as needed (as needed for pain)., Disp: 100 g, Rfl: 3    diclofenac sodium 1 % kit, Apply 1 Application topically every 12 hours. for 30 day(s), Disp: , Rfl:     furosemide (Lasix) 40 mg tablet, TAKE ONE TABLET BY MOUTH ONCE A DAY, Disp: 90 tablet, Rfl: 3    levothyroxine (Synthroid) 150 mcg tablet, Take 1 tablet (150 mcg) by mouth see administration instructions. MONDAY through SATURDAY, and take 1 AND 1/2 tablets on SUNDAY Orally as directed, Disp: 96 tablet, Rfl: 1    melatonin 10 mg tablet, Take by mouth once daily at bedtime. As directed, Disp: , Rfl:     multivitamin (Multiple Vitamins) tablet, Take 1 tablet by mouth once daily., Disp: , Rfl:     potassium chloride CR (Klor-Con) 10 mEq ER tablet, TAKE ONE TABLET BY  MOUTH WITH FOOD TWICE A DAY, Disp: 180 tablet, Rfl: 2    sacubitriL-valsartan (Entresto) 49-51 mg tablet, TAKE 1 TABLET BY MOUTH TWICE DAILLY, Disp: 180 tablet, Rfl: 2    venlafaxine XR (Effexor-XR) 150 mg 24 hr capsule, Take 1 tablet by mouth once a day, Disp: 30 capsule, Rfl: 1      Allergies as of 12/05/2023 - Reviewed 12/05/2023   Allergen Reaction Noted    Latex Rash 09/04/2023    Sulfa (sulfonamide antibiotics) Rash and Unknown 09/04/2023         Review of Systems   Cardiology: Lightheadedness-denies.  Chest pain-denies.  Leg edema-denies.  Palpitations-denies.  Respiratory: Cough-denies. Shortness of breath-denies.  Wheezing-denies.  Gastroenterology: Constipation-denies.  Diarrhea-denies.  Heartburn-denies.  Endocrinology: Cold intolerance-denies.  Heat intolerance-denies.  Sweats-denies.  Neurology: Headache-denies.  Tremor-denies.  Neuropathy in extremities-denies.  Psychology: Low energy-positive.  Irritability-denies.  Sleep disturbances-denies.      /78 (BP Location: Left arm, Patient Position: Sitting)   Pulse 92   Wt 99.7 kg (219 lb 12.8 oz)   BMI 30.66 kg/m²       Labs:  Lab Results   Component Value Date    WBC 4.2 (L) 11/22/2022    NRBC 0 11/22/2022    RBC 3.53 (L) 11/22/2022    HGB 12.5 (L) 11/22/2022    HCT 39.6 (L) 11/22/2022     11/22/2022     Lab Results   Component Value Date    CALCIUM 9.1 10/17/2023    AST 16 10/17/2023    ALKPHOS 49 10/17/2023    BILITOT 0.2 10/17/2023    PROT 6.6 10/17/2023    ALBUMIN 4.2 10/17/2023    GLOB 3.1 11/18/2022    AGR 1.2 (L) 11/18/2022     10/17/2023    K 4.8 10/17/2023     10/17/2023    CO2 28 10/17/2023    ANIONGAP 9 10/17/2023    BUN 20 10/17/2023    CREATININE 1.20 10/17/2023    UREACREAUR 14.2 12/08/2022    GLUCOSE 115 (H) 10/17/2023    ALT 13 10/17/2023    EGFR 70 10/17/2023     Lab Results   Component Value Date    CHOL 202 (H) 10/17/2023    TRIG 165 (H) 10/17/2023    HDL 44.0 10/17/2023    LDLCALC 125 10/17/2023     No  "results found for: \"MICROALBCREA\"  Lab Results   Component Value Date    TSH 1.32 10/17/2023     No results found for: \"[id number]\"  No results found for: \"HGBA1C\"      Physical Exam   General Appearance: pleasant, cooperative, no acute distress  HEENT: no chemosis, no proptosis, no lid lag, no lid retraction  Neck: no lymphadenopathy, no thyroid tissue palpated  Heart: no murmurs, regular rate and rhythm, S1 and S2  Lungs: no wheezes, no rhonci, no rales  Extremities: no lower extremity swelling      Assessment/Plan   1. Acquired hypothyroidism  -euthyroid on current dosage, follow levels yearly  -given his medical hx, would suggest testing tsh, thyroglobulin, anti-thyroglobulin antibody yearly (could follow with pcp for this and see endo if levels elevated)  2. Thyroid cancer  -would consider low risk at this point  -please repeat tg/tg ab levels  -if stable can follow yearly      Follow Up:  prn    -labs/tests/notes reviewed  -reviewed and counseled patient on medication monitoring and side effects          "

## 2023-12-05 ENCOUNTER — PHARMACY VISIT (OUTPATIENT)
Dept: PHARMACY | Facility: CLINIC | Age: 58
End: 2023-12-05
Payer: MEDICAID

## 2023-12-05 ENCOUNTER — OFFICE VISIT (OUTPATIENT)
Dept: ENDOCRINOLOGY | Facility: CLINIC | Age: 58
End: 2023-12-05
Payer: COMMERCIAL

## 2023-12-05 VITALS
WEIGHT: 219.8 LBS | HEART RATE: 92 BPM | DIASTOLIC BLOOD PRESSURE: 78 MMHG | BODY MASS INDEX: 30.66 KG/M2 | SYSTOLIC BLOOD PRESSURE: 132 MMHG

## 2023-12-05 DIAGNOSIS — C73 THYROID CARCINOMA (MULTI): Primary | ICD-10-CM

## 2023-12-05 DIAGNOSIS — E03.9 ACQUIRED HYPOTHYROIDISM: ICD-10-CM

## 2023-12-05 PROCEDURE — RXMED WILLOW AMBULATORY MEDICATION CHARGE

## 2023-12-05 PROCEDURE — 99213 OFFICE O/P EST LOW 20 MIN: CPT | Performed by: INTERNAL MEDICINE

## 2023-12-05 PROCEDURE — 1036F TOBACCO NON-USER: CPT | Performed by: INTERNAL MEDICINE

## 2023-12-05 PROCEDURE — 3075F SYST BP GE 130 - 139MM HG: CPT | Performed by: INTERNAL MEDICINE

## 2023-12-05 PROCEDURE — 3078F DIAST BP <80 MM HG: CPT | Performed by: INTERNAL MEDICINE

## 2023-12-05 RX ORDER — ALPRAZOLAM 0.5 MG/1
TABLET ORAL
Qty: 135 TABLET | Refills: 1 | OUTPATIENT
Start: 2023-12-05 | End: 2024-01-16 | Stop reason: SDUPTHER

## 2023-12-05 RX ORDER — VENLAFAXINE HYDROCHLORIDE 150 MG/1
CAPSULE, EXTENDED RELEASE ORAL
Qty: 30 CAPSULE | Refills: 1 | OUTPATIENT
Start: 2023-12-05 | End: 2024-01-16 | Stop reason: SDUPTHER

## 2023-12-05 ASSESSMENT — ENCOUNTER SYMPTOMS: DEPRESSION: 1

## 2023-12-05 ASSESSMENT — PAIN SCALES - GENERAL: PAINLEVEL: 0-NO PAIN

## 2023-12-07 ENCOUNTER — TELEPHONE (OUTPATIENT)
Dept: PRIMARY CARE | Facility: CLINIC | Age: 58
End: 2023-12-07

## 2023-12-07 ENCOUNTER — PHARMACY VISIT (OUTPATIENT)
Dept: PHARMACY | Facility: CLINIC | Age: 58
End: 2023-12-07

## 2023-12-08 PROCEDURE — RXMED WILLOW AMBULATORY MEDICATION CHARGE

## 2023-12-09 ENCOUNTER — PHARMACY VISIT (OUTPATIENT)
Dept: PHARMACY | Facility: CLINIC | Age: 58
End: 2023-12-09
Payer: MEDICAID

## 2023-12-21 ENCOUNTER — OFFICE VISIT (OUTPATIENT)
Dept: CARDIOLOGY | Facility: CLINIC | Age: 58
End: 2023-12-21
Payer: COMMERCIAL

## 2023-12-21 VITALS — SYSTOLIC BLOOD PRESSURE: 140 MMHG | DIASTOLIC BLOOD PRESSURE: 82 MMHG | OXYGEN SATURATION: 97 % | HEART RATE: 92 BPM

## 2023-12-21 DIAGNOSIS — I42.8 NONISCHEMIC CARDIOMYOPATHY (MULTI): ICD-10-CM

## 2023-12-21 DIAGNOSIS — E78.49 OTHER HYPERLIPIDEMIA: ICD-10-CM

## 2023-12-21 DIAGNOSIS — I10 BENIGN ESSENTIAL HYPERTENSION: ICD-10-CM

## 2023-12-21 DIAGNOSIS — I50.22 CHRONIC SYSTOLIC HEART FAILURE (MULTI): Primary | ICD-10-CM

## 2023-12-21 PROBLEM — E78.5 HLD (HYPERLIPIDEMIA): Status: ACTIVE | Noted: 2023-12-21

## 2023-12-21 PROCEDURE — RXMED WILLOW AMBULATORY MEDICATION CHARGE

## 2023-12-21 PROCEDURE — 3077F SYST BP >= 140 MM HG: CPT | Performed by: INTERNAL MEDICINE

## 2023-12-21 PROCEDURE — 99214 OFFICE O/P EST MOD 30 MIN: CPT | Performed by: INTERNAL MEDICINE

## 2023-12-21 PROCEDURE — 3079F DIAST BP 80-89 MM HG: CPT | Performed by: INTERNAL MEDICINE

## 2023-12-21 RX ORDER — DAPAGLIFLOZIN 10 MG/1
10 TABLET, FILM COATED ORAL DAILY
Qty: 30 TABLET | Refills: 11 | Status: SHIPPED | OUTPATIENT
Start: 2023-12-21 | End: 2024-12-20

## 2023-12-21 ASSESSMENT — ENCOUNTER SYMPTOMS
WHEEZING: 0
OCCASIONAL FEELINGS OF UNSTEADINESS: 0
DEPRESSION: 0
DIZZINESS: 0
CLAUDICATION: 0
MYALGIAS: 0
DYSPNEA ON EXERTION: 0
WEAKNESS: 0
ORTHOPNEA: 0
FEVER: 0
PALPITATIONS: 0
PND: 0
SHORTNESS OF BREATH: 0
COUGH: 0
LOSS OF SENSATION IN FEET: 0
NEAR-SYNCOPE: 0
IRREGULAR HEARTBEAT: 0
WEIGHT LOSS: 0
WEIGHT GAIN: 0
DIAPHORESIS: 0
SYNCOPE: 0

## 2023-12-21 ASSESSMENT — PAIN SCALES - GENERAL: PAINLEVEL: 0-NO PAIN

## 2023-12-21 ASSESSMENT — PATIENT HEALTH QUESTIONNAIRE - PHQ9
SUM OF ALL RESPONSES TO PHQ9 QUESTIONS 1 & 2: 0
1. LITTLE INTEREST OR PLEASURE IN DOING THINGS: NOT AT ALL
2. FEELING DOWN, DEPRESSED OR HOPELESS: NOT AT ALL

## 2023-12-21 NOTE — ASSESSMENT & PLAN NOTE
His 10-year ASCVD risk is 11% which is borderline. Discussed lifestyle modification. Regular physical exercise 150min/week per [facility]. Mediterranean Diet

## 2023-12-21 NOTE — ASSESSMENT & PLAN NOTE
>>ASSESSMENT AND PLAN FOR BENIGN ESSENTIAL HYPERTENSION WRITTEN ON 12/21/2023  1:59 PM BY TREVOR CORTEZ, DO    Borderline high. Normal home readings

## 2023-12-21 NOTE — ASSESSMENT & PLAN NOTE
Euvolemic. Continue carvedilol, Entresto. Continue daily lasix. Would like to start him on Farxiga.

## 2023-12-21 NOTE — PROGRESS NOTES
Subjective      Chief Complaint   Patient presents with    Follow-up     6 month follow up        58-year-old male with history of alcoholic abuse seen in the hospital in November 2022 with acute coronavirus infection.  He has significant lower extremity swelling.  An echocardiogram identified severe left ventricular systolic dysfunction with an ejection fraction of 20 to 25%.  He underwent coronary angiography to reveal normal coronary arteries.  He was started on guideline directed medical therapy as well as an oral diuretic.  The thought was that his dilated cardiomyopathy could be secondary to alcohol abuse.  I saw him in the office last in June he was doing well on guideline directed medical therapy.  He had an echocardiogram at that time that showed improvement of ejection fraction to 35%.  Left ventricular size was still moderately enlarged. He has no issues with chest pain or dyspnea. He has no LE swelling.              Review of Systems   Constitutional: Negative for diaphoresis, fever, weight gain and weight loss.   Eyes:  Negative for visual disturbance.   Cardiovascular:  Negative for chest pain, claudication, dyspnea on exertion, irregular heartbeat, leg swelling, near-syncope, orthopnea, palpitations, paroxysmal nocturnal dyspnea and syncope.   Respiratory:  Negative for cough, shortness of breath and wheezing.    Musculoskeletal:  Negative for muscle weakness and myalgias.   Neurological:  Negative for dizziness and weakness.   All other systems reviewed and are negative.       Past Medical History:   Diagnosis Date    Anxiety 4/01/23    Cancer (CMS/HCC) 10/01/0-    CHF (congestive heart failure) (CMS/HCC) 10/01/23    Depression 10/01/23    Hypothyroidism     Thyroid cancer (CMS/HCC)         Past Surgical History:   Procedure Laterality Date    APPENDECTOMY  10/01/96    COLONOSCOPY  09/11/2020    HERNIA REPAIR      THYROIDECTOMY      VASECTOMY          Social History     Socioeconomic History     Marital status:      Spouse name: Not on file    Number of children: Not on file    Years of education: Not on file    Highest education level: Not on file   Occupational History    Not on file   Tobacco Use    Smoking status: Former     Packs/day: 1.00     Years: 15.00     Additional pack years: 0.00     Total pack years: 15.00     Types: Cigarettes     Start date: 2008     Quit date: 2023     Years since quittin.4    Smokeless tobacco: Never   Substance and Sexual Activity    Alcohol use: Not Currently    Drug use: Never    Sexual activity: Defer     Partners: Female     Birth control/protection: None   Other Topics Concern    Not on file   Social History Narrative    Not on file     Social Determinants of Health     Financial Resource Strain: Not on file   Food Insecurity: Not on file   Transportation Needs: Not on file   Physical Activity: Not on file   Stress: Not on file   Social Connections: Not on file   Intimate Partner Violence: Not on file   Housing Stability: Not on file        Family History   Problem Relation Name Age of Onset    Heart disease Mother      Heart disease Sister (1)     Other (pacemaker) Sister (1)     Heart disease Brother      Atrial fibrillation Brother          OBJECTIVE:    Vitals:    23 1335   BP: 140/82   Pulse: 92   SpO2: 97%        Vitals reviewed.   Constitutional:       Appearance: Normal and healthy appearance. Not in distress.   Pulmonary:      Effort: Pulmonary effort is normal.      Breath sounds: Normal breath sounds.   Cardiovascular:      Normal rate. Regular rhythm. Normal S1. Normal S2.       Murmurs: There is no murmur.      No gallop.  No click.   Pulses:     Intact distal pulses.   Edema:     Peripheral edema absent.   Skin:     General: Skin is warm and dry.   Neurological:      General: No focal deficit present.          Lab Review:   Lab Results   Component Value Date     10/17/2023    K 4.8 10/17/2023     10/17/2023    CO2 28  10/17/2023    BUN 20 10/17/2023    CREATININE 1.20 10/17/2023    GLUCOSE 115 (H) 10/17/2023    CALCIUM 9.1 10/17/2023     Lab Results   Component Value Date    CHOL 202 (H) 10/17/2023    TRIG 165 (H) 10/17/2023    HDL 44.0 10/17/2023       Lab Results   Component Value Date    LDLCALC 125 10/17/2023        HLD (hyperlipidemia)  His 10-year ASCVD risk is 11% which is borderline. Discussed lifestyle modification. Regular physical exercise 150min/week per [facility]HA. Mediterranean Diet    Chronic systolic heart failure (CMS/HCC)  Euvolemic. Continue carvedilol, Entresto. Continue daily lasix. Would like to start him on Farxiga.     Nonischemic cardiomyopathy (CMS/HCC)  Likely 2/2 EtOH abuse. He has not been drinking.     Benign essential hypertension  Borderline high. Normal home readings

## 2023-12-26 ENCOUNTER — PHARMACY VISIT (OUTPATIENT)
Dept: PHARMACY | Facility: CLINIC | Age: 58
End: 2023-12-26
Payer: MEDICAID

## 2024-01-03 ENCOUNTER — APPOINTMENT (OUTPATIENT)
Dept: ENDOCRINOLOGY | Facility: CLINIC | Age: 59
End: 2024-01-03
Payer: COMMERCIAL

## 2024-01-04 PROCEDURE — RXMED WILLOW AMBULATORY MEDICATION CHARGE

## 2024-01-05 ENCOUNTER — PHARMACY VISIT (OUTPATIENT)
Dept: PHARMACY | Facility: CLINIC | Age: 59
End: 2024-01-05
Payer: MEDICAID

## 2024-01-30 PROCEDURE — RXMED WILLOW AMBULATORY MEDICATION CHARGE

## 2024-01-31 ENCOUNTER — PHARMACY VISIT (OUTPATIENT)
Dept: PHARMACY | Facility: CLINIC | Age: 59
End: 2024-01-31
Payer: MEDICAID

## 2024-02-02 ENCOUNTER — PHARMACY VISIT (OUTPATIENT)
Dept: PHARMACY | Facility: CLINIC | Age: 59
End: 2024-02-02
Payer: MEDICAID

## 2024-02-02 PROCEDURE — RXMED WILLOW AMBULATORY MEDICATION CHARGE

## 2024-02-02 ASSESSMENT — ENCOUNTER SYMPTOMS
CHILLS: 0
FLANK PAIN: 1
DYSURIA: 1
NAUSEA: 0
VOMITING: 0
SWEATS: 0
HEMATURIA: 0
FREQUENCY: 1

## 2024-02-05 ENCOUNTER — PHARMACY VISIT (OUTPATIENT)
Dept: PHARMACY | Facility: CLINIC | Age: 59
End: 2024-02-05
Payer: MEDICAID

## 2024-02-05 ENCOUNTER — OFFICE VISIT (OUTPATIENT)
Dept: PRIMARY CARE | Facility: CLINIC | Age: 59
End: 2024-02-05
Payer: COMMERCIAL

## 2024-02-05 VITALS
HEIGHT: 71 IN | TEMPERATURE: 98 F | HEART RATE: 90 BPM | DIASTOLIC BLOOD PRESSURE: 94 MMHG | OXYGEN SATURATION: 98 % | SYSTOLIC BLOOD PRESSURE: 150 MMHG | BODY MASS INDEX: 31.53 KG/M2 | WEIGHT: 225.2 LBS

## 2024-02-05 DIAGNOSIS — B35.6 TINEA CRURIS: ICD-10-CM

## 2024-02-05 DIAGNOSIS — R30.0 DYSURIA: Primary | ICD-10-CM

## 2024-02-05 LAB
POC APPEARANCE, URINE: ABNORMAL
POC BILIRUBIN, URINE: NEGATIVE
POC BLOOD, URINE: ABNORMAL
POC COLOR, URINE: YELLOW
POC GLUCOSE, URINE: ABNORMAL MG/DL
POC KETONES, URINE: NEGATIVE MG/DL
POC LEUKOCYTES, URINE: NEGATIVE
POC NITRITE,URINE: NEGATIVE
POC PH, URINE: 5 PH
POC PROTEIN, URINE: ABNORMAL MG/DL
POC SPECIFIC GRAVITY, URINE: 1.02
POC UROBILINOGEN, URINE: 0.2 EU/DL

## 2024-02-05 PROCEDURE — 1036F TOBACCO NON-USER: CPT | Performed by: FAMILY MEDICINE

## 2024-02-05 PROCEDURE — 81002 URINALYSIS NONAUTO W/O SCOPE: CPT | Performed by: FAMILY MEDICINE

## 2024-02-05 PROCEDURE — 3080F DIAST BP >= 90 MM HG: CPT | Performed by: FAMILY MEDICINE

## 2024-02-05 PROCEDURE — 99214 OFFICE O/P EST MOD 30 MIN: CPT | Performed by: FAMILY MEDICINE

## 2024-02-05 PROCEDURE — 3077F SYST BP >= 140 MM HG: CPT | Performed by: FAMILY MEDICINE

## 2024-02-05 PROCEDURE — RXMED WILLOW AMBULATORY MEDICATION CHARGE

## 2024-02-05 RX ORDER — PRENATAL VIT 91/IRON/FOLIC/DHA 28-975-200
COMBINATION PACKAGE (EA) ORAL 2 TIMES DAILY
Qty: 15 G | Refills: 0 | Status: SHIPPED | OUTPATIENT
Start: 2024-02-05

## 2024-02-05 RX ORDER — FLUCONAZOLE 200 MG/1
200 TABLET ORAL DAILY
Qty: 14 TABLET | Refills: 0 | Status: SHIPPED | OUTPATIENT
Start: 2024-02-05 | End: 2024-02-19

## 2024-02-05 ASSESSMENT — PAIN SCALES - GENERAL: PAINLEVEL: 3

## 2024-02-05 ASSESSMENT — PATIENT HEALTH QUESTIONNAIRE - PHQ9
2. FEELING DOWN, DEPRESSED OR HOPELESS: NOT AT ALL
1. LITTLE INTEREST OR PLEASURE IN DOING THINGS: NOT AT ALL
SUM OF ALL RESPONSES TO PHQ9 QUESTIONS 1 AND 2: 0

## 2024-02-05 NOTE — PROGRESS NOTES
outpatient Visit Note    Chief Complaint   Patient presents with    UTI     Tip of penis is red, burning after urinating x1 1/2 weeks.          HPI:  Todd Small is a 58 y.o. male with a complex past medical history significant for CHF, nonischemic cardiomyopathy, hypertension, alcohol abuse, anxiety and postprocedural hypothyroidism who presents to the office for left ear concerns.  He was last seen in the office on 10/17/2023 for thyroid follow-up.     Of note, he was previously seen on 8/17/2023 secondary to concerns regarding ear infection.     In review, at prior encounter he reported 3 weeks of severe left-sided ear pain. Was in [place] over the last month to which he did present to a local physician for evaluation on 8/5/2023. Was ultimately started on regimen of Augmentin and Ciprodex drops. States that symptoms seem to momentarily improve though upon completing prescriptions he continued to have moderate to severe pain extending from ear to middle area of his forehead. Denied any active ear swelling or discharge though pain remained extremely prominent and sensitive to touch. Did have moderate dizziness which has slightly improved. Denied any falls or syncope. Patient had been struggling with the pain which has created significant irritability/sleep disruption. Had been attempting to optimize Tylenol without significant relief. Had used opioid pain relievers in the past stating that tramadol does not have any effect.  Had leftover prescription for oxycodone though was worried about this being too potent as he does take chronic alprazolam through psychiatry. Ultimately patient was started on doxycycline and Medrol pack with small prescription of as needed hydrocodone given along with ENT referral.  Had cleaned significant amounts of wax otherwise here denying any active hearing issues or recurrent pain issues.  Was ultimately evaluated by Dr. Miller of ENT on 11/2/2023.  At that time he reported  resolution of symptoms with doxycycline and Medrol Dosepak.  No further concerns were identified at that time with no further intervention needed.    Today he reports intermittent, waxing and waning intermittent aching discomfort in his left ear.  States his symptoms started approximately 1 week ago and have been fairly mild.    Patient's biggest complaint is in regards to persistent and progressive urinary discomfort with focal irritation at penile meatus for approximately 2 weeks.  Denies any discharge.  States that skin around meatus has been erythematous intermittently.  Notes urinary frequency/urgency.  Denies any urinary odor, hematuria or visible urine changes.  States you have been started on Farxiga in the interval.      Current Medications  Current Outpatient Medications   Medication Instructions    ALPRAZolam (Xanax) 0.5 mg tablet Take 1.5 tablets by mouth in the morning, 1.5 tablets at midday, and 3 tablets at bedtime as needed    carvedilol (Coreg) 12.5 mg tablet TAKE 1 TABLET BY MOUTH TWO TIMES A DAY WITH FOOD    cholecalciferol (Vitamin D-3) 50 mcg (2,000 unit) capsule 1 capsule, oral, Daily    diclofenac sodium (Voltaren) 1 % gel gel 1 Application, Topical, 4 times daily PRN    Farxiga 10 mg, oral, Daily    fluconazole (DIFLUCAN) 200 mg, oral, Daily    furosemide (Lasix) 40 mg tablet TAKE ONE TABLET BY MOUTH ONCE A DAY    levothyroxine (SYNTHROID) 150 mcg, oral, See admin instructions, MONDAY through SATURDAY, and take 1 AND 1/2 tablets on SUNDAY Orally as directed    potassium chloride CR (Klor-Con) 10 mEq ER tablet TAKE ONE TABLET BY MOUTH WITH FOOD TWICE A DAY    sacubitriL-valsartan (Entresto) 49-51 mg tablet TAKE 1 TABLET BY MOUTH TWICE DAILLY    terbinafine (LamISIL) 1 % cream Topical, 2 times daily    venlafaxine XR (Effexor-XR) 150 mg 24 hr capsule Take 1 capsule by mouth once daily        Allergies  Allergies   Allergen Reactions    Latex Rash    Sulfa (Sulfonamide Antibiotics) Rash and  Unknown        Past Medical History:   Diagnosis Date    Anxiety 23    Cancer (CMS/[facility]) 10/01/0-    CHF (congestive heart failure) (CMS/[facility]) 10/01/23    Depression 10/01/23    Hypothyroidism     Thyroid cancer (CMS/[facility])       Past Surgical History:   Procedure Laterality Date    APPENDECTOMY  10/01/96    COLONOSCOPY  2020    HERNIA REPAIR      THYROIDECTOMY      VASECTOMY       Family History   Problem Relation Name Age of Onset    Heart disease Mother Kim     Heart disease Sister (1)     Other (pacemaker) Sister (1)     Heart disease Brother Skye     Atrial fibrillation Brother Gilberto      Social History     Tobacco Use    Smoking status: Former     Packs/day: 1.00     Years: 15.00     Additional pack years: 0.00     Total pack years: 15.00     Types: Cigarettes     Start date: 2008     Quit date: 2023     Years since quittin.6    Smokeless tobacco: Never   Vaping Use    Vaping Use: Never used   Substance Use Topics    Alcohol use: Not Currently    Drug use: Never       ROS  All pertinent positive symptoms are included in the history of present illness.  All other systems have been reviewed and are negative and noncontributory to this patient's current ailments.    VITAL SIGNS  Vitals:    24 0813   BP: (!) 150/94   Pulse: 90   Temp: 36.7 °C (98 °F)   SpO2: 98%       PHYSICAL EXAM  GENERAL APPEARANCE: alert and oriented, Pleasant and cooperative, No Acute Distress  HEENT: EOMI, PERRLA, mild external auditory canal irritation left, tympanic membranes clear and flat, nose clear, Oropharynx clear with MMM  : No visible penile rash  SKIN: normal, no rash, unremarkable  NEUROLOGIC EXAM: non-focal exam  MUSCULOSKELETAL: no gross abnormalities  PSYCH: affect is normal, eye contact is good    Assessment/Plan   Problem List Items Addressed This Visit             ICD-10-CM    Dysuria - Primary R30.0     - Urinalysis performed in office today secondary to symptoms which was remarkable for  prominent glucosuria with no evidence of hematuria or leukocytes  -Infection likely secondary to sugar spilling from Farxiga causing fungal infection  -Will initiate oral antifungal regimen  -Good hydration advocated  -If symptoms persist, would recommend talking to specialist regarding alternative options/discontinuing Farxiga         Relevant Medications    fluconazole (Diflucan) 200 mg tablet    Other Relevant Orders    POCT UA (nonautomated) manually resulted (Completed)    Tinea cruris B35.6     - Secondary to skin irritation, will additionally send antifungal topical to use as directed         Relevant Medications    terbinafine (LamISIL) 1 % cream

## 2024-02-06 PROBLEM — B35.6 TINEA CRURIS: Status: ACTIVE | Noted: 2024-02-06

## 2024-02-06 PROBLEM — R30.0 DYSURIA: Status: ACTIVE | Noted: 2024-02-06

## 2024-02-06 NOTE — PATIENT INSTRUCTIONS
Problem List Items Addressed This Visit             ICD-10-CM    Dysuria - Primary R30.0     - Urinalysis performed in office today secondary to symptoms which was remarkable for prominent glucosuria with no evidence of hematuria or leukocytes  -Infection likely secondary to sugar spilling from Farxiga causing fungal infection  -Will initiate oral antifungal regimen  -Good hydration advocated  -If symptoms persist, would recommend talking to specialist regarding alternative options/discontinuing Farxiga         Relevant Medications    fluconazole (Diflucan) 200 mg tablet    Other Relevant Orders    POCT UA (nonautomated) manually resulted (Completed)    Tinea cruris B35.6     - Secondary to skin irritation, will additionally send antifungal topical to use as directed         Relevant Medications    terbinafine (LamISIL) 1 % cream

## 2024-02-06 NOTE — ASSESSMENT & PLAN NOTE
- Urinalysis performed in office today secondary to symptoms which was remarkable for prominent glucosuria with no evidence of hematuria or leukocytes  -Infection likely secondary to sugar spilling from Farxiga causing fungal infection  -Will initiate oral antifungal regimen  -Good hydration advocated  -If symptoms persist, would recommend talking to specialist regarding alternative options/discontinuing Farxiga

## 2024-02-13 PROCEDURE — RXMED WILLOW AMBULATORY MEDICATION CHARGE

## 2024-02-15 ENCOUNTER — PHARMACY VISIT (OUTPATIENT)
Dept: PHARMACY | Facility: CLINIC | Age: 59
End: 2024-02-15
Payer: MEDICAID

## 2024-02-15 DIAGNOSIS — E03.9 ACQUIRED HYPOTHYROIDISM: ICD-10-CM

## 2024-02-15 RX ORDER — LEVOTHYROXINE SODIUM 150 UG/1
150 TABLET ORAL SEE ADMIN INSTRUCTIONS
Qty: 96 TABLET | Refills: 1 | Status: CANCELLED | OUTPATIENT
Start: 2024-02-15

## 2024-02-26 DIAGNOSIS — I50.22 CHRONIC SYSTOLIC HEART FAILURE (MULTI): Primary | ICD-10-CM

## 2024-02-29 PROCEDURE — RXMED WILLOW AMBULATORY MEDICATION CHARGE

## 2024-03-01 ENCOUNTER — PHARMACY VISIT (OUTPATIENT)
Dept: PHARMACY | Facility: CLINIC | Age: 59
End: 2024-03-01
Payer: MEDICAID

## 2024-03-01 PROCEDURE — RXMED WILLOW AMBULATORY MEDICATION CHARGE

## 2024-03-02 ASSESSMENT — ENCOUNTER SYMPTOMS
FEVER: 0
COUGH: 0
RHINORRHEA: 0
FATIGUE: 1
VOMITING: 0
DIARRHEA: 0
NAIL CHANGES: 0
SORE THROAT: 0
ANOREXIA: 0
SHORTNESS OF BREATH: 0
EYE PAIN: 0

## 2024-03-04 ENCOUNTER — OFFICE VISIT (OUTPATIENT)
Dept: PRIMARY CARE | Facility: CLINIC | Age: 59
End: 2024-03-04
Payer: COMMERCIAL

## 2024-03-04 ENCOUNTER — LAB (OUTPATIENT)
Dept: LAB | Facility: LAB | Age: 59
End: 2024-03-04
Payer: COMMERCIAL

## 2024-03-04 VITALS
HEART RATE: 93 BPM | HEIGHT: 71 IN | BODY MASS INDEX: 31.27 KG/M2 | TEMPERATURE: 98.4 F | WEIGHT: 223.4 LBS | DIASTOLIC BLOOD PRESSURE: 80 MMHG | OXYGEN SATURATION: 96 % | SYSTOLIC BLOOD PRESSURE: 132 MMHG

## 2024-03-04 DIAGNOSIS — L28.2 PRURITIC RASH: Primary | ICD-10-CM

## 2024-03-04 DIAGNOSIS — C73 THYROID CARCINOMA (MULTI): ICD-10-CM

## 2024-03-04 DIAGNOSIS — I50.22 CHRONIC SYSTOLIC HEART FAILURE (MULTI): ICD-10-CM

## 2024-03-04 LAB
ANION GAP SERPL CALC-SCNC: 13 MMOL/L
BUN SERPL-MCNC: 23 MG/DL (ref 8–25)
CALCIUM SERPL-MCNC: 9.1 MG/DL (ref 8.5–10.4)
CHLORIDE SERPL-SCNC: 98 MMOL/L (ref 97–107)
CO2 SERPL-SCNC: 28 MMOL/L (ref 24–31)
CREAT SERPL-MCNC: 1.4 MG/DL (ref 0.4–1.6)
EGFRCR SERPLBLD CKD-EPI 2021: 58 ML/MIN/1.73M*2
GLUCOSE SERPL-MCNC: 59 MG/DL (ref 65–99)
POTASSIUM SERPL-SCNC: 4.7 MMOL/L (ref 3.4–5.1)
SODIUM SERPL-SCNC: 139 MMOL/L (ref 133–145)

## 2024-03-04 PROCEDURE — 3079F DIAST BP 80-89 MM HG: CPT | Performed by: FAMILY MEDICINE

## 2024-03-04 PROCEDURE — 36415 COLL VENOUS BLD VENIPUNCTURE: CPT

## 2024-03-04 PROCEDURE — 1036F TOBACCO NON-USER: CPT | Performed by: FAMILY MEDICINE

## 2024-03-04 PROCEDURE — 99214 OFFICE O/P EST MOD 30 MIN: CPT | Performed by: FAMILY MEDICINE

## 2024-03-04 PROCEDURE — 80048 BASIC METABOLIC PNL TOTAL CA: CPT

## 2024-03-04 PROCEDURE — RXMED WILLOW AMBULATORY MEDICATION CHARGE

## 2024-03-04 PROCEDURE — 3075F SYST BP GE 130 - 139MM HG: CPT | Performed by: FAMILY MEDICINE

## 2024-03-04 RX ORDER — TRIAMCINOLONE ACETONIDE 1 MG/G
CREAM TOPICAL 2 TIMES DAILY
Qty: 30 G | Refills: 1 | Status: SHIPPED | OUTPATIENT
Start: 2024-03-04

## 2024-03-04 RX ORDER — HYDROXYZINE HYDROCHLORIDE 25 MG/1
25 TABLET, FILM COATED ORAL 3 TIMES DAILY
Qty: 12 TABLET | Refills: 1 | Status: SHIPPED | OUTPATIENT
Start: 2024-03-04 | End: 2024-03-11 | Stop reason: SDUPTHER

## 2024-03-04 ASSESSMENT — PATIENT HEALTH QUESTIONNAIRE - PHQ9
1. LITTLE INTEREST OR PLEASURE IN DOING THINGS: NOT AT ALL
SUM OF ALL RESPONSES TO PHQ9 QUESTIONS 1 AND 2: 0
2. FEELING DOWN, DEPRESSED OR HOPELESS: NOT AT ALL

## 2024-03-04 ASSESSMENT — PAIN SCALES - GENERAL: PAINLEVEL: 2

## 2024-03-04 NOTE — PROGRESS NOTES
outpatient Visit Note    Chief Complaint   Patient presents with    Rash     Ankles and back x5 days. Itchy. Tried Cedafil with no relief and seems to be worse. No changes in soaps or detergents         HPI:  Todd Small is a 58 y.o. male with a complex past medical history significant for CHF, nonischemic cardiomyopathy, hypertension, alcohol abuse, anxiety and postprocedural hypothyroidism who presents to the office for concerns regarding rash.  He was last seen in the office on 2/5/2024 secondary to left ear complaints and dysuria.    In review, at prior encounter he reported persistent and progressive urinary discomfort with focal irritation at penile meatus for approximately 2 weeks.  Denied any discharge.  Stated that skin around meatus has been erythematous intermittently.  Noted urinary frequency/urgency.  Denied any urinary odor, hematuria or visible urine changes.  Stated to have been started on Farxiga in the interval.  Ultimately patient was started on topical and oral antifungals with reports that symptoms had improved in interval.    Today he reports approximately 1 week of irritating/itching rash on back and bilateral ankles.  Reports to have had chronic itching of upper back for some time though medial aspects of ankles have been itchy for the last week without explainable cause.  Denies any new personal hygiene products.  Does practice good hygiene patterns specially when using facilities at the [facility].  Denies any spread of rash since onset.  Has been resisting temptation to itch.  Denies taking anything for symptom relief.    Of note, does state that cardiac regimen has been gradually overall over time including increase in dose of Entresto.  Unclear if this is related.  Does follow regularly with Dr. Bush      Current Medications  Current Outpatient Medications   Medication Instructions    ALPRAZolam (Xanax) 0.5 mg tablet Take 1.5 tablets by mouth in the morning, 1.5 tablets at midday, and 3  tablets at bedtime as needed    carvedilol (Coreg) 12.5 mg tablet TAKE 1 TABLET BY MOUTH TWO TIMES A DAY WITH FOOD    cholecalciferol (Vitamin D-3) 50 mcg (2,000 unit) capsule 1 capsule, oral, Daily    diclofenac sodium (Voltaren) 1 % gel gel 1 Application, Topical, 4 times daily PRN    Farxiga 10 mg, oral, Daily    furosemide (Lasix) 40 mg tablet TAKE ONE TABLET BY MOUTH ONCE A DAY    hydrOXYzine HCL (ATARAX) 25 mg, oral, 3 times daily    levothyroxine (SYNTHROID) 150 mcg, oral, See admin instructions, MONDAY through SATURDAY, and take 1 AND 1/2 tablets on SUNDAY Orally as directed    potassium chloride CR (Klor-Con) 10 mEq ER tablet TAKE ONE TABLET BY MOUTH WITH FOOD TWICE A DAY    sacubitriL-valsartan (Entresto) 49-51 mg tablet TAKE 1 TABLET BY MOUTH TWICE DAILLY    terbinafine (LamISIL) 1 % cream Topical, 2 times daily    triamcinolone (Kenalog) 0.1 % cream Topical, 2 times daily, Apply to affected area 1-2 times daily as needed. Avoid face and groin.    venlafaxine 150 mg 24 hr tablet Take 1 tablet by mouth once a day    venlafaxine XR (Effexor-XR) 150 mg 24 hr capsule Take 1 capsule by mouth once daily        Allergies  Allergies   Allergen Reactions    Latex Rash    Sulfa (Sulfonamide Antibiotics) Rash and Unknown        Past Medical History:   Diagnosis Date    Anxiety 4/01/23    Cancer (CMS/[facility]) 10/01/0-    CHF (congestive heart failure) (CMS/[facility]) 10/01/23    Depression 10/01/23    Hypothyroidism     Thyroid cancer (CMS/[facility])       Past Surgical History:   Procedure Laterality Date    APPENDECTOMY  10/01/96    COLONOSCOPY  09/11/2020    HERNIA REPAIR      THYROIDECTOMY      VASECTOMY       Family History   Problem Relation Name Age of Onset    Heart disease Mother Kim     Heart disease Sister (1)     Other (pacemaker) Sister (1)     Heart disease Brother Skye     Atrial fibrillation Brother Gilberto      Social History     Tobacco Use    Smoking status: Former     Packs/day: 1.00     Years: 15.00      Additional pack years: 0.00     Total pack years: 15.00     Types: Cigarettes     Start date: 2008     Quit date: 2023     Years since quittin.6    Smokeless tobacco: Never   Vaping Use    Vaping Use: Never used   Substance Use Topics    Alcohol use: Not Currently    Drug use: Never       ROS  All pertinent positive symptoms are included in the history of present illness.  All other systems have been reviewed and are negative and noncontributory to this patient's current ailments.    VITAL SIGNS  Vitals:    24 1021   BP: 132/80   Pulse: 93   Temp: 36.9 °C (98.4 °F)   SpO2: 96%         PHYSICAL EXAM  GENERAL APPEARANCE: alert and oriented, Pleasant and cooperative, No Acute Distress  HEENT: EOMI, PERRLA, mild external auditory canal irritation left, tympanic membranes clear and flat, nose clear, Oropharynx clear with MMM  : No visible penile rash  SKIN: Scattered erythematous rash on bilateral medial ankles with no visible pustules, vesicles/papules; erythema on mid upper back with signs of prior excoriations  NEUROLOGIC EXAM: non-focal exam  MUSCULOSKELETAL: no gross abnormalities  PSYCH: affect is normal, eye contact is good    Assessment/Plan   Problem List Items Addressed This Visit             ICD-10-CM    Pruritic rash - Primary L28.2     - unclear the exact cause of your symptoms though rash does not seem to be infectious in nature  -Will trial topical anti-inflammatory ointment  -I have additionally sent as needed hydroxyzine which can be used if itch gets worse  -Please let me know if rash persists or progresses as we can get dermatology consultation if warranted         Relevant Medications    triamcinolone (Kenalog) 0.1 % cream    hydrOXYzine HCL (Atarax) 25 mg tablet

## 2024-03-04 NOTE — ASSESSMENT & PLAN NOTE
- unclear the exact cause of your symptoms though rash does not seem to be infectious in nature  -Will trial topical anti-inflammatory ointment  -I have additionally sent as needed hydroxyzine which can be used if itch gets worse  -Please let me know if rash persists or progresses as we can get dermatology consultation if warranted

## 2024-03-05 ENCOUNTER — PHARMACY VISIT (OUTPATIENT)
Dept: PHARMACY | Facility: CLINIC | Age: 59
End: 2024-03-05
Payer: MEDICAID

## 2024-03-09 ENCOUNTER — PHARMACY VISIT (OUTPATIENT)
Dept: PHARMACY | Facility: CLINIC | Age: 59
End: 2024-03-09
Payer: MEDICAID

## 2024-03-09 PROCEDURE — RXMED WILLOW AMBULATORY MEDICATION CHARGE

## 2024-03-11 DIAGNOSIS — L28.2 PRURITIC RASH: ICD-10-CM

## 2024-03-12 PROCEDURE — RXMED WILLOW AMBULATORY MEDICATION CHARGE

## 2024-03-12 RX ORDER — HYDROXYZINE HYDROCHLORIDE 25 MG/1
25 TABLET, FILM COATED ORAL 3 TIMES DAILY
Qty: 12 TABLET | Refills: 1 | Status: SHIPPED | OUTPATIENT
Start: 2024-03-12

## 2024-03-18 ENCOUNTER — PHARMACY VISIT (OUTPATIENT)
Dept: PHARMACY | Facility: CLINIC | Age: 59
End: 2024-03-18
Payer: MEDICAID

## 2024-03-19 PROCEDURE — RXMED WILLOW AMBULATORY MEDICATION CHARGE

## 2024-03-25 DIAGNOSIS — E03.9 ACQUIRED HYPOTHYROIDISM: ICD-10-CM

## 2024-03-25 RX ORDER — LEVOTHYROXINE SODIUM 150 UG/1
150 TABLET ORAL SEE ADMIN INSTRUCTIONS
Qty: 96 TABLET | Refills: 1 | Status: CANCELLED | OUTPATIENT
Start: 2024-03-25

## 2024-03-26 DIAGNOSIS — E03.9 ACQUIRED HYPOTHYROIDISM: ICD-10-CM

## 2024-03-27 PROCEDURE — RXMED WILLOW AMBULATORY MEDICATION CHARGE

## 2024-03-27 NOTE — TELEPHONE ENCOUNTER
----- Message from Todd Small sent at 3/26/2024  1:10 PM EDT -----  Regarding: Levothyroxine   Contact: [phone]  Hope you’re well Dr. Swan. May I please have a refill for my levothyroxine thank you in advance!

## 2024-03-27 NOTE — TELEPHONE ENCOUNTER
Rx request received  Pharmacy populated  Last appmt 3/05/24 for acute visit. 10/2023 for Thyroid check

## 2024-03-28 DIAGNOSIS — I50.22 CHRONIC SYSTOLIC HEART FAILURE (MULTI): Primary | ICD-10-CM

## 2024-03-28 PROCEDURE — RXMED WILLOW AMBULATORY MEDICATION CHARGE

## 2024-03-28 RX ORDER — LEVOTHYROXINE SODIUM 150 UG/1
150 TABLET ORAL SEE ADMIN INSTRUCTIONS
Qty: 96 TABLET | Refills: 1 | Status: SHIPPED | OUTPATIENT
Start: 2024-03-28

## 2024-03-28 NOTE — TELEPHONE ENCOUNTER
Patient made aware.   Updated Rx with same refills as sent in on 2/16.     Rx refill sent per request

## 2024-04-01 ENCOUNTER — PHARMACY VISIT (OUTPATIENT)
Dept: PHARMACY | Facility: CLINIC | Age: 59
End: 2024-04-01
Payer: MEDICAID

## 2024-04-01 DIAGNOSIS — I50.22 CHRONIC SYSTOLIC HEART FAILURE (MULTI): ICD-10-CM

## 2024-04-01 RX ORDER — VENLAFAXINE HYDROCHLORIDE 37.5 MG/1
CAPSULE, EXTENDED RELEASE ORAL
Qty: 30 CAPSULE | Refills: 1 | OUTPATIENT
Start: 2024-04-01

## 2024-04-01 RX ORDER — VENLAFAXINE HYDROCHLORIDE 150 MG/1
CAPSULE, EXTENDED RELEASE ORAL
Qty: 30 CAPSULE | Refills: 1 | OUTPATIENT
Start: 2024-04-01

## 2024-04-02 DIAGNOSIS — L28.2 PRURITIC RASH: ICD-10-CM

## 2024-04-02 PROCEDURE — RXMED WILLOW AMBULATORY MEDICATION CHARGE

## 2024-04-02 RX ORDER — HYDROXYZINE HYDROCHLORIDE 25 MG/1
25 TABLET, FILM COATED ORAL 3 TIMES DAILY
Qty: 12 TABLET | Refills: 1 | OUTPATIENT
Start: 2024-04-02

## 2024-04-02 NOTE — TELEPHONE ENCOUNTER
Pt states hydroxyzine was ordered for him and is being sent to the pharmacy and he does not want it refilled does not want the medication the issue he used it for is over and he does not want it

## 2024-04-03 ENCOUNTER — PHARMACY VISIT (OUTPATIENT)
Dept: PHARMACY | Facility: CLINIC | Age: 59
End: 2024-04-03
Payer: MEDICAID

## 2024-04-03 PROCEDURE — RXMED WILLOW AMBULATORY MEDICATION CHARGE

## 2024-04-10 PROCEDURE — RXMED WILLOW AMBULATORY MEDICATION CHARGE

## 2024-04-11 ENCOUNTER — PHARMACY VISIT (OUTPATIENT)
Dept: PHARMACY | Facility: CLINIC | Age: 59
End: 2024-04-11
Payer: MEDICAID

## 2024-04-15 PROCEDURE — RXMED WILLOW AMBULATORY MEDICATION CHARGE

## 2024-04-17 ENCOUNTER — PHARMACY VISIT (OUTPATIENT)
Dept: PHARMACY | Facility: CLINIC | Age: 59
End: 2024-04-17
Payer: MEDICAID

## 2024-04-29 PROCEDURE — RXMED WILLOW AMBULATORY MEDICATION CHARGE

## 2024-04-30 ENCOUNTER — PHARMACY VISIT (OUTPATIENT)
Dept: PHARMACY | Facility: CLINIC | Age: 59
End: 2024-04-30
Payer: MEDICAID

## 2024-04-30 PROCEDURE — RXMED WILLOW AMBULATORY MEDICATION CHARGE

## 2024-05-01 ENCOUNTER — PHARMACY VISIT (OUTPATIENT)
Dept: PHARMACY | Facility: CLINIC | Age: 59
End: 2024-05-01
Payer: MEDICAID

## 2024-05-01 PROCEDURE — RXMED WILLOW AMBULATORY MEDICATION CHARGE

## 2024-05-28 DIAGNOSIS — I50.22 CHRONIC SYSTOLIC HEART FAILURE (MULTI): Primary | ICD-10-CM

## 2024-05-28 RX ORDER — CARVEDILOL 12.5 MG/1
12.5 TABLET ORAL
Qty: 180 TABLET | Refills: 3 | Status: CANCELLED | OUTPATIENT
Start: 2024-05-28 | End: 2025-05-28

## 2024-05-29 ENCOUNTER — PHARMACY VISIT (OUTPATIENT)
Dept: PHARMACY | Facility: CLINIC | Age: 59
End: 2024-05-29
Payer: MEDICAID

## 2024-05-29 PROCEDURE — RXMED WILLOW AMBULATORY MEDICATION CHARGE

## 2024-05-29 RX ORDER — CARVEDILOL 12.5 MG/1
12.5 TABLET ORAL
Qty: 180 TABLET | Refills: 3 | Status: SHIPPED | OUTPATIENT
Start: 2024-05-29 | End: 2025-05-29

## 2024-05-30 PROCEDURE — RXMED WILLOW AMBULATORY MEDICATION CHARGE

## 2024-05-31 ENCOUNTER — PHARMACY VISIT (OUTPATIENT)
Dept: PHARMACY | Facility: CLINIC | Age: 59
End: 2024-05-31
Payer: MEDICAID

## 2024-05-31 PROCEDURE — RXMED WILLOW AMBULATORY MEDICATION CHARGE

## 2024-06-01 ENCOUNTER — E-VISIT (OUTPATIENT)
Dept: PRIMARY CARE | Facility: CLINIC | Age: 59
End: 2024-06-01
Payer: COMMERCIAL

## 2024-06-06 ENCOUNTER — OFFICE VISIT (OUTPATIENT)
Dept: CARDIOLOGY | Facility: CLINIC | Age: 59
End: 2024-06-06
Payer: COMMERCIAL

## 2024-06-06 ENCOUNTER — PHARMACY VISIT (OUTPATIENT)
Dept: PHARMACY | Facility: CLINIC | Age: 59
End: 2024-06-06
Payer: MEDICAID

## 2024-06-06 VITALS — WEIGHT: 228 LBS | BODY MASS INDEX: 31.8 KG/M2 | DIASTOLIC BLOOD PRESSURE: 90 MMHG | SYSTOLIC BLOOD PRESSURE: 145 MMHG

## 2024-06-06 DIAGNOSIS — R07.89 OTHER CHEST PAIN: ICD-10-CM

## 2024-06-06 DIAGNOSIS — I10 PRIMARY HYPERTENSION: ICD-10-CM

## 2024-06-06 DIAGNOSIS — I50.22 CHRONIC SYSTOLIC HEART FAILURE (MULTI): Primary | ICD-10-CM

## 2024-06-06 DIAGNOSIS — I10 BENIGN ESSENTIAL HYPERTENSION: ICD-10-CM

## 2024-06-06 DIAGNOSIS — E78.49 OTHER HYPERLIPIDEMIA: ICD-10-CM

## 2024-06-06 PROCEDURE — 99214 OFFICE O/P EST MOD 30 MIN: CPT | Performed by: INTERNAL MEDICINE

## 2024-06-06 PROCEDURE — 93005 ELECTROCARDIOGRAM TRACING: CPT | Performed by: INTERNAL MEDICINE

## 2024-06-06 PROCEDURE — RXMED WILLOW AMBULATORY MEDICATION CHARGE

## 2024-06-06 PROCEDURE — 93010 ELECTROCARDIOGRAM REPORT: CPT | Performed by: INTERNAL MEDICINE

## 2024-06-06 PROCEDURE — 3077F SYST BP >= 140 MM HG: CPT | Performed by: INTERNAL MEDICINE

## 2024-06-06 PROCEDURE — 3080F DIAST BP >= 90 MM HG: CPT | Performed by: INTERNAL MEDICINE

## 2024-06-06 RX ORDER — AMLODIPINE BESYLATE 5 MG/1
5 TABLET ORAL DAILY
Qty: 30 TABLET | Refills: 11 | Status: SHIPPED | OUTPATIENT
Start: 2024-06-06 | End: 2024-06-06 | Stop reason: SDUPTHER

## 2024-06-06 RX ORDER — AMLODIPINE BESYLATE 5 MG/1
5 TABLET ORAL DAILY
Qty: 90 TABLET | Refills: 3 | Status: SHIPPED | OUTPATIENT
Start: 2024-06-06 | End: 2025-06-06

## 2024-06-06 ASSESSMENT — ENCOUNTER SYMPTOMS
WEIGHT LOSS: 0
SYNCOPE: 0
DYSPNEA ON EXERTION: 0
CLAUDICATION: 0
PALPITATIONS: 0
PND: 0
FEVER: 0
IRREGULAR HEARTBEAT: 0
COUGH: 0
DIZZINESS: 0
WEAKNESS: 0
NEAR-SYNCOPE: 0
MYALGIAS: 0
ORTHOPNEA: 0
DIAPHORESIS: 0
WEIGHT GAIN: 0
SHORTNESS OF BREATH: 0
WHEEZING: 0

## 2024-06-06 NOTE — ASSESSMENT & PLAN NOTE
>>ASSESSMENT AND PLAN FOR BENIGN ESSENTIAL HYPERTENSION WRITTEN ON 6/6/2024  2:24 PM BY DO Sravan PLASCENCIA. Adding Norvasc 5mg

## 2024-06-06 NOTE — PROGRESS NOTES
Subjective      Chief Complaint   Patient presents with    Follow-up     Patient not feeling well, describes chest pressure and tightness.         59-year-old male with history of alcohol abuse seen in the hospital in November 2022 with acute coronavirus infection.  He has significant lower extremity swelling.  An echocardiogram identified severe left ventricular systolic dysfunction with an ejection fraction of 20 to 25%, this is increased to 35% on the most recent study.  He underwent coronary angiography to reveal normal coronary arteries.  He was started on guideline directed medical therapy as well as an oral diuretic.  The thought was that his dilated cardiomyopathy could be secondary to alcohol abuse.  He comes in for routine follow-up. He has a lot of pain, primarily MSK         Review of Systems   Constitutional: Negative for diaphoresis, fever, weight gain and weight loss.   Eyes:  Negative for visual disturbance.   Cardiovascular:  Negative for chest pain, claudication, dyspnea on exertion, irregular heartbeat, leg swelling, near-syncope, orthopnea, palpitations, paroxysmal nocturnal dyspnea and syncope.   Respiratory:  Negative for cough, shortness of breath and wheezing.    Musculoskeletal:  Negative for muscle weakness and myalgias.   Neurological:  Negative for dizziness and weakness.   All other systems reviewed and are negative.       Past Medical History:   Diagnosis Date    Anxiety 4/01/23    Cancer (Multi) 10/01/0-    CHF (congestive heart failure) (Multi) 10/01/23    Depression 10/01/23    Hypothyroidism     Thyroid cancer (Multi)         Past Surgical History:   Procedure Laterality Date    APPENDECTOMY  10/01/96    COLONOSCOPY  09/11/2020    HERNIA REPAIR      THYROIDECTOMY      VASECTOMY          Social History     Socioeconomic History    Marital status:      Spouse name: Not on file    Number of children: Not on file    Years of education: Not on file    Highest education level: Not  on file   Occupational History    Not on file   Tobacco Use    Smoking status: Former     Current packs/day: 0.00     Average packs/day: 1 pack/day for 15.0 years (15.0 ttl pk-yrs)     Types: Cigarettes     Start date: 2008     Quit date: 2023     Years since quittin.9    Smokeless tobacco: Never   Vaping Use    Vaping status: Never Used   Substance and Sexual Activity    Alcohol use: Not Currently    Drug use: Never    Sexual activity: Not Currently     Partners: Female     Birth control/protection: None   Other Topics Concern    Not on file   Social History Narrative    Not on file     Social Determinants of Health     Financial Resource Strain: Not on file   Food Insecurity: Not on file   Transportation Needs: Not on file   Physical Activity: Not on file   Stress: Not on file   Social Connections: Not on file   Intimate Partner Violence: Not on file   Housing Stability: Not on file        Family History   Problem Relation Name Age of Onset    Heart disease Mother Kim     Heart disease Sister (1)     Other (pacemaker) Sister (1)     Heart disease Brother Skye     Atrial fibrillation Brother Gilberto         OBJECTIVE:    There were no vitals filed for this visit.     Vitals reviewed.   Constitutional:       Appearance: Normal and healthy appearance. Not in distress.   Pulmonary:      Effort: Pulmonary effort is normal.      Breath sounds: Normal breath sounds.   Cardiovascular:      Normal rate. Regular rhythm. Normal S1. Normal S2.       Murmurs: There is no murmur.      No gallop.  No click.   Pulses:     Intact distal pulses.   Edema:     Peripheral edema absent.   Skin:     General: Skin is warm and dry.   Neurological:      General: No focal deficit present.          Lab Review:   Lab Results   Component Value Date     2024    K 4.7 2024    CL 98 2024    CO2 28 2024    BUN 23 2024    CREATININE 1.40 2024    GLUCOSE 59 (L) 2024    CALCIUM 9.1  03/04/2024     Lab Results   Component Value Date    WBC 4.2 (L) 11/22/2022    HGB 12.5 (L) 11/22/2022    HCT 39.6 (L) 11/22/2022    .2 (H) 11/22/2022     11/22/2022       Lab Results   Component Value Date    LDLCALC 125 10/17/2023        Chronic systolic heart failure (Multi)  Euvolemic. Continue BB, ARNI, [id number] and lasix    Benign essential hypertension  Uncontrolled. Adding Norvasc 5mg

## 2024-06-19 ENCOUNTER — E-VISIT (OUTPATIENT)
Dept: PRIMARY CARE | Facility: CLINIC | Age: 59
End: 2024-06-19

## 2024-06-19 ENCOUNTER — HOSPITAL ENCOUNTER (OUTPATIENT)
Dept: RADIOLOGY | Facility: HOSPITAL | Age: 59
Discharge: HOME | End: 2024-06-19
Payer: COMMERCIAL

## 2024-06-19 ENCOUNTER — OFFICE VISIT (OUTPATIENT)
Dept: PAIN MEDICINE | Facility: CLINIC | Age: 59
End: 2024-06-19
Payer: COMMERCIAL

## 2024-06-19 ENCOUNTER — PHARMACY VISIT (OUTPATIENT)
Dept: PHARMACY | Facility: CLINIC | Age: 59
End: 2024-06-19
Payer: MEDICAID

## 2024-06-19 VITALS
WEIGHT: 228 LBS | SYSTOLIC BLOOD PRESSURE: 126 MMHG | OXYGEN SATURATION: 97 % | HEART RATE: 91 BPM | BODY MASS INDEX: 31.92 KG/M2 | DIASTOLIC BLOOD PRESSURE: 88 MMHG | RESPIRATION RATE: 18 BRPM | HEIGHT: 71 IN

## 2024-06-19 DIAGNOSIS — R21 GROIN RASH: ICD-10-CM

## 2024-06-19 DIAGNOSIS — M54.50 CHRONIC BILATERAL LOW BACK PAIN, UNSPECIFIED WHETHER SCIATICA PRESENT: ICD-10-CM

## 2024-06-19 DIAGNOSIS — M79.89 CALF SWELLING: Primary | ICD-10-CM

## 2024-06-19 DIAGNOSIS — L28.2 PRURITIC RASH: ICD-10-CM

## 2024-06-19 DIAGNOSIS — G89.29 CHRONIC BILATERAL LOW BACK PAIN, UNSPECIFIED WHETHER SCIATICA PRESENT: ICD-10-CM

## 2024-06-19 DIAGNOSIS — B35.6 TINEA CRURIS: ICD-10-CM

## 2024-06-19 DIAGNOSIS — M79.89 CALF SWELLING: ICD-10-CM

## 2024-06-19 DIAGNOSIS — R30.0 DYSURIA: Primary | ICD-10-CM

## 2024-06-19 DIAGNOSIS — M48.061 SPINAL STENOSIS OF LUMBAR REGION WITHOUT NEUROGENIC CLAUDICATION: ICD-10-CM

## 2024-06-19 DIAGNOSIS — G89.29 OTHER CHRONIC PAIN: ICD-10-CM

## 2024-06-19 PROCEDURE — 3079F DIAST BP 80-89 MM HG: CPT | Performed by: ANESTHESIOLOGY

## 2024-06-19 PROCEDURE — 99205 OFFICE O/P NEW HI 60 MIN: CPT | Performed by: ANESTHESIOLOGY

## 2024-06-19 PROCEDURE — RXMED WILLOW AMBULATORY MEDICATION CHARGE

## 2024-06-19 PROCEDURE — 93970 EXTREMITY STUDY: CPT

## 2024-06-19 PROCEDURE — 3074F SYST BP LT 130 MM HG: CPT | Performed by: ANESTHESIOLOGY

## 2024-06-19 PROCEDURE — 99215 OFFICE O/P EST HI 40 MIN: CPT | Performed by: ANESTHESIOLOGY

## 2024-06-19 PROCEDURE — 93970 EXTREMITY STUDY: CPT | Performed by: RADIOLOGY

## 2024-06-19 RX ORDER — PRENATAL VIT 91/IRON/FOLIC/DHA 28-975-200
COMBINATION PACKAGE (EA) ORAL 2 TIMES DAILY
Qty: 15 G | Refills: 0 | Status: CANCELLED | OUTPATIENT
Start: 2024-06-19

## 2024-06-19 RX ORDER — HYDROCODONE BITARTRATE AND ACETAMINOPHEN 5; 325 MG/1; MG/1
1 TABLET ORAL 3 TIMES DAILY PRN
Qty: 42 TABLET | Refills: 0 | Status: SHIPPED | OUTPATIENT
Start: 2024-06-19 | End: 2024-07-03

## 2024-06-19 RX ORDER — TRIAMCINOLONE ACETONIDE 1 MG/G
CREAM TOPICAL 2 TIMES DAILY
Qty: 30 G | Refills: 1 | Status: CANCELLED | OUTPATIENT
Start: 2024-06-19

## 2024-06-19 ASSESSMENT — PAIN SCALES - GENERAL
PAINLEVEL_OUTOF10: 8
PAINLEVEL: 8

## 2024-06-19 ASSESSMENT — ENCOUNTER SYMPTOMS
WEAKNESS: 1
HEMATOLOGIC/LYMPHATIC NEGATIVE: 1
LOSS OF SENSATION IN FEET: 0
CONSTITUTIONAL NEGATIVE: 1
GASTROINTESTINAL NEGATIVE: 1
RESPIRATORY NEGATIVE: 1
NUMBNESS: 1
BACK PAIN: 1
EYES NEGATIVE: 1
PSYCHIATRIC NEGATIVE: 1
DEPRESSION: 0
OCCASIONAL FEELINGS OF UNSTEADINESS: 1
ENDOCRINE NEGATIVE: 1
CARDIOVASCULAR NEGATIVE: 1

## 2024-06-19 ASSESSMENT — PAIN - FUNCTIONAL ASSESSMENT: PAIN_FUNCTIONAL_ASSESSMENT: 0-10

## 2024-06-19 ASSESSMENT — PATIENT HEALTH QUESTIONNAIRE - PHQ9
10. IF YOU CHECKED OFF ANY PROBLEMS, HOW DIFFICULT HAVE THESE PROBLEMS MADE IT FOR YOU TO DO YOUR WORK, TAKE CARE OF THINGS AT HOME, OR GET ALONG WITH OTHER PEOPLE: SOMEWHAT DIFFICULT
SUM OF ALL RESPONSES TO PHQ9 QUESTIONS 1 AND 2: 2
2. FEELING DOWN, DEPRESSED OR HOPELESS: SEVERAL DAYS
1. LITTLE INTEREST OR PLEASURE IN DOING THINGS: SEVERAL DAYS

## 2024-06-19 ASSESSMENT — PAIN DESCRIPTION - DESCRIPTORS: DESCRIPTORS: ACHING

## 2024-06-19 ASSESSMENT — LIFESTYLE VARIABLES: TOTAL SCORE: 0

## 2024-06-19 NOTE — LETTER
June 20, 2024     Phillip Morales DO  [address]  [address]  [facility] [address]    Patient: Todd Small   YOB: 1965   Date of Visit: 6/19/2024       Dear Dr. Phillip Morales DO:    Thank you for referring Todd Small to me for evaluation. Below are my notes for this consultation.  If you have questions, please do not hesitate to call me. I look forward to following your patient along with you.       Sincerely,     Nathaly Cantrell MD      CC: Naveen Morales DO  ______________________________________________________________________________________    PAIN MANAGEMENT NEW PATIENT OFFICE NOTE    Date of Service: 6/19/2024    SUBJECTIVE    CHIEF COMPLAINT: LBP    HISTORY OF PRESENT ILLNESS    Todd Small is a 59 y.o. male with PMH CHF 2/2 former EtOH use, former smoker, CKD, MDD, papillary thyroid ca s/p thyroidectomy/RT with hypothyroidism who presents as new patient referred by Dr Morales with LB pain.    Pt describes LBP and BLE pain for 6 mo on feeling of whole body unwell attributed to his CHF. Pt notes he has chronic chest pain and SOB, which he reports cardiology is aware of. Claims he was not a candidate for ICD. Pt describes worst pains in AM. This is located in BL thighs and calves. Pain is worse with standing/walking as well as with prolonged sitting. He uses a cane to get around. BLE pain assoc with R anterior thigh numbness, BLE weakness. Pt has tried Tylenol, biofreeze, SNRI. NSAIDs contraindicated 2/2 CKD.     Pt also insists on reviewing groin rash, which he says was recently treated by PCP with antifungal    Pt denies new-onset bowel/bladder incontinence.  Pt denies recent infection, allergy to Latex/iodine/contrast. Patient is currently taking the following blood thinner(s): N/A    REVIEW OF SYSTEMS  Review of Systems   Constitutional: Negative.    HENT: Negative.     Eyes: Negative.    Respiratory: Negative.     Cardiovascular: Negative.    Gastrointestinal: Negative.     Endocrine: Negative.    Musculoskeletal:  Positive for back pain.   Skin: Negative.    Neurological:  Positive for weakness and numbness.   Hematological: Negative.    Psychiatric/Behavioral: Negative.         PAST MEDICAL HISTORY  Past Medical History:   Diagnosis Date   • Anxiety 4/01/23   • Cancer (Multi) 10/01/0-   • CHF (congestive heart failure) (Multi) 10/01/23   • Depression 10/01/23   • Extremity pain    • Headache    • Hypothyroidism    • Low back pain    • Thyroid cancer (Multi)      Past Surgical History:   Procedure Laterality Date   • APPENDECTOMY  10/01/96   • CARDIAC CATHETERIZATION     • COLONOSCOPY  09/11/2020   • HERNIA REPAIR     • THYROIDECTOMY     • VASECTOMY       Family History   Problem Relation Name Age of Onset   • Heart disease Mother Kim    • Heart disease Sister (1)    • Other (pacemaker) Sister (1)    • Heart disease Brother Skye    • Atrial fibrillation Brother Gilberto        CURRENT MEDICATIONS  Current Outpatient Medications   Medication Sig Dispense Refill   • ALPRAZolam (Xanax) 0.5 mg tablet Take 1.5 oral tablets in the morning, 1.5 tablets at midday, and 4 tablets at bedtime as needed 210 tablet 1   • ALPRAZolam (Xanax) 0.5 mg tablet Take 1-2 tablets by mouth  in the morning, 1-2 tablets at midday, and 4 tablets at bedtime as needed 240 tablet 1   • ALPRAZolam (Xanax) 0.5 mg tablet Take 1-2 tablets by mouth in the morning, take 1-2 tablets at midday, and take  4 tablets at bedtime as needed 240 tablet 1   • amLODIPine (Norvasc) 5 mg tablet Take 1 tablet (5 mg) by mouth once daily. 90 tablet 3   • carvedilol (Coreg) 12.5 mg tablet TAKE 1 TABLET BY MOUTH TWO TIMES A DAY WITH FOOD 180 tablet 3   • cholecalciferol (Vitamin D-3) 50 mcg (2,000 unit) capsule Take 1 capsule (50 mcg) by mouth early in the morning..     • dapagliflozin propanediol (Farxiga) 10 mg Take 1 tablet (10 mg) by mouth once daily. 30 tablet 11   • diclofenac sodium (Voltaren) 1 % gel Apply 1 Application  topically 4 times a day as needed (as needed for pain). 100 g 3   • furosemide (Lasix) 40 mg tablet TAKE ONE TABLET BY MOUTH ONCE A DAY 90 tablet 3   • levothyroxine (Synthroid) 150 mcg tablet Take 1 tablet (150 mcg) by mouth see administration instructions. MONDAY through SATURDAY, and take 1 AND 1/2 tablets on SUNDAY Orally as directed 96 tablet 1   • potassium chloride CR (Klor-Con) 10 mEq ER tablet TAKE ONE TABLET BY MOUTH WITH FOOD TWICE A  tablet 2   • sacubitriL-valsartan (Entresto)  mg tablet Take 1 tablet by mouth 2 times a day. 180 tablet 3   • terbinafine (LamISIL) 1 % cream Apply topically 2 times a day. 15 g 0   • triamcinolone (Kenalog) 0.1 % cream Apply topically to affected area 1-2 times daily as needed. Avoid face and groin. 30 g 1   • venlafaxine XR (Effexor-XR) 150 mg 24 hr capsule Take 1 capsule by mouth once a day (take with 37.5mg capsule for a total of 187.5mg daily) 30 capsule 1   • venlafaxine XR (Effexor-XR) 150 mg 24 hr capsule Take 1 capsule by mouth  once a day (take with 37.5mg capsule for a total of 187.5mg daily) 30 capsule 1   • venlafaxine XR (Effexor-XR) 150 mg 24 hr capsule Take 1 capsule by mouth once a day (take with 37.5mg capsule for a total of 187.5mg daily) 30 capsule 1   • venlafaxine XR (Effexor-XR) 150 mg 24 hr capsule Take 1 capsule by mouth once a day (take with 37.5mg capsule for a total of 187.5mg daily) 30 capsule 1   • venlafaxine XR (Effexor-XR) 37.5 mg 24 hr capsule Take 1 capsule by mouth once a day (take with 150mg capsule for a total of 187.5mg daily) 30 capsule 1   • venlafaxine XR (Effexor-XR) 37.5 mg 24 hr capsule Take 1 capsule by mouth once a day (take with 150mg capsule for a total of 187.5mg daily) 30 capsule 1   • venlafaxine XR (Effexor-XR) 37.5 mg 24 hr capsule Take 1 capsule by mouth once a day (take with 150mg capsule for a total of 187.5mg daily) 30 capsule 1   • venlafaxine XR (Effexor-XR) 37.5 mg 24 hr capsule Take 1 capsule by  "mouth once a day (take with 150mg capsule for a total of 187.5mg daily) 30 capsule 1   • hydrOXYzine HCL (Atarax) 25 mg tablet Take 1 tablet (25 mg) by mouth 3 times a day. 12 tablet 1   • venlafaxine 150 mg 24 hr tablet Take 1 tablet by mouth once a day (Patient not taking: Reported on 6/19/2024) 30 tablet 1   • venlafaxine 150 mg 24 hr tablet Take 1 tabletby mouth once a day (take with 37.5mg capsule for a total of 187.5mg daily) 30 tablet 1     No current facility-administered medications for this visit.       ALLERGIES AND DRUG REACTIONS  Allergies   Allergen Reactions   • Latex Rash   • Sulfa (Sulfonamide Antibiotics) Rash and Unknown          OBJECTIVE  Visit Vitals  /88   Pulse 91   Resp 18   Ht 1.803 m (5' 11\")   Wt 103 kg (228 lb)   SpO2 97%   BMI 31.80 kg/m²   Smoking Status Former   BSA 2.27 m²       Last Recorded Pain Score (if available):                Physical Exam  General: Sitting in chair, NAD, antalgic gait with cane and hunched forward  Head: NCAT  Eyes: Sclera/conjunctiva clear, EOMI, PERRL  Nose/mouth: MMM  CV: Good distal pulses  Lungs: Good/equal chest excursion  Abdomen: Soft, ND  Ext: No cyanosis/edema  MSK: L-spine alignment: unremarkable, BL paraspinal m TTP, L-spine ROM: ext limited by pain  Neuro: AAOx3   Dermatome sensation to light touch  LEFT L1 (lower pelvis/upper thigh): WNL    RIGHT L1: WNL      LEFT L2 (upper thigh): WNL       RIGHT: L2:WNL      LEFT L3 (medial knee): WNL       RIGHT L3: WNL      LEFT L4 (superior medial malleolus): WNL       RIGHT L4: WNL      LEFT L5 (dorsal foot): WNL       RIGHT L5: WNL      LEFT S1 (lateral foot): WNL     RIGHT S1: WNL      LEFT S2 (popliteal fossa): WNL    RIGHT S2: WNL        Motor strength  LEFT L2 (hip flexion): 5/5   RIGHT L2: 5/5  LEFT L3 (knee extension): 5/5     RIGHT L3: 5/5  LEFT L4 (dorsiflexion): 5/5     RIGHT L4: 5/5  LEFT L5 (EHL extension): 5/5     RIGHT L5: 5/5  LEFT S1 (plantarflexion): 5/5     RIGHT S1: 5/5  LEFT S2 " "(knee flexion): 5/5      RIGHT S2: 5/5    Special testing  DTR diminished patellar reflexes  Seated slump test neg BL  Clonus: neg BL  Babinski: neg BL    Psych: affect nl, agitated  Skin: BL groin rash      REVIEW OF LABORATORY DATA  I have reviewed the following lab results:  WBC   Date Value Ref Range Status   11/22/2022 4.2 (L) 4.5 - 11.0 K/UL Final     RBC   Date Value Ref Range Status   11/22/2022 3.53 (L) 4.5 - 5.5 M/UL Final     Hemoglobin   Date Value Ref Range Status   11/22/2022 12.5 (L) 13.5 - 16.5 GM/DL Final     Hematocrit   Date Value Ref Range Status   11/22/2022 39.6 (L) 41 - 50 % Final     MCV   Date Value Ref Range Status   11/22/2022 112.2 (H) 80 - 100 FL Final     Comment:     CONSISTENT WITH PREVIOUS RESULT   RESULT CHECKED       MCH   Date Value Ref Range Status   11/22/2022 35.4 (H) 26 - 34 PG Final     MCHC   Date Value Ref Range Status   11/22/2022 31.6 31 - 37 % Final     Platelets   Date Value Ref Range Status   11/22/2022 153 150 - 450 K/UL Final     MPV   Date Value Ref Range Status   11/22/2022 9.4 7.0 - 12.6 CU Final     Sodium   Date Value Ref Range Status   03/04/2024 139 133 - 145 mmol/L Final     Potassium   Date Value Ref Range Status   03/04/2024 4.7 3.4 - 5.1 mmol/L Final     Bicarbonate   Date Value Ref Range Status   03/04/2024 28 24 - 31 mmol/L Final     Urea Nitrogen   Date Value Ref Range Status   03/04/2024 23 8 - 25 mg/dL Final     Calcium   Date Value Ref Range Status   03/04/2024 9.1 8.5 - 10.4 mg/dL Final     No results found for: \"PROTIME\", \"PTT\", \"INR\", \"FIBRINOGEN\"      REVIEW OF RADIOLOGY   I have reviewed the following:  Radiology Studies           N/A       ASSESSMENT & PLAN  Todd Small is a 59 y.o. old male with PMH CHF 2/2 former EtOH use, former smoker, CKD, MDD, papillary thyroid ca s/p thyroidectomy/RT with hypothyroidism who presents as new patient referred by Naveen Morales DO with LB pain    1) Lumbar spinal stenosis  -LBP radiating down BLE " with standing/walking affecting gait, function, ADLs, and QoL  -Refractive to Tylenol, biofreeze, SNRI  -NSAIDs contraindicated 2/2 CKD. MDP may worsen rash  -Unable to meaningfully participate in PT 2/2 pain severity and late-stage CHF  -MRI L-spine to eval neuraxial pathology   -Norco 5 mg TID PRN (15 MME) x14 d in meantime  -F/U 2 w. Pt wishes to see physician at [place] office    2) BL calf pain/swelling  -Since 2 w. Aside from baseline chronic CP and VENEGAS, denies worsening sx  -STAT BLE venous Duplex r/o DVT    3) Groin rash  -Possibly recurrent tinea cruris. Reiterated that as a pain physician, I recommend he follow-up with his PCP for possible new prescription of antifungal. In meantime, can try OTC zinc oxide (Bucky)               Nathaly Cantrell MD  Anesthesiologist & Interventional Pain Physician   [facility] [place]  O: [phone]  F: [phone]  2:32 PM  06/19/24

## 2024-06-19 NOTE — PROGRESS NOTES
PAIN MANAGEMENT NEW PATIENT OFFICE NOTE    Date of Service: 6/19/2024    SUBJECTIVE    CHIEF COMPLAINT: LBP    HISTORY OF PRESENT ILLNESS    Todd Small is a 59 y.o. male with PMH CHF 2/2 former EtOH use, former smoker, CKD, MDD, papillary thyroid ca s/p thyroidectomy/RT with hypothyroidism who presents as new patient referred by Dr Morales with LB pain.    Pt describes LBP and BLE pain for 6 mo on feeling of whole body unwell attributed to his CHF. Pt notes he has chronic chest pain and SOB, which he reports cardiology is aware of. Claims he was not a candidate for ICD. Pt describes worst pains in AM. This is located in BL thighs and calves. Pain is worse with standing/walking as well as with prolonged sitting. He uses a cane to get around. BLE pain assoc with R anterior thigh numbness, BLE weakness. Pt has tried Tylenol, biofreeze, SNRI. NSAIDs contraindicated 2/2 CKD.     Pt also insists on reviewing groin rash, which he says was recently treated by PCP with antifungal    Pt denies new-onset bowel/bladder incontinence.  Pt denies recent infection, allergy to Latex/iodine/contrast. Patient is currently taking the following blood thinner(s): N/A    REVIEW OF SYSTEMS  Review of Systems   Constitutional: Negative.    HENT: Negative.     Eyes: Negative.    Respiratory: Negative.     Cardiovascular: Negative.    Gastrointestinal: Negative.    Endocrine: Negative.    Musculoskeletal:  Positive for back pain.   Skin: Negative.    Neurological:  Positive for weakness and numbness.   Hematological: Negative.    Psychiatric/Behavioral: Negative.         PAST MEDICAL HISTORY  Past Medical History:   Diagnosis Date    Anxiety 4/01/23    Cancer (Multi) 10/01/0-    CHF (congestive heart failure) (Multi) 10/01/23    Depression 10/01/23    Extremity pain     Headache     Hypothyroidism     Low back pain     Thyroid cancer (Multi)      Past Surgical History:   Procedure Laterality Date    APPENDECTOMY  10/01/96    CARDIAC  CATHETERIZATION      COLONOSCOPY  09/11/2020    HERNIA REPAIR      THYROIDECTOMY      VASECTOMY       Family History   Problem Relation Name Age of Onset    Heart disease Mother Kim     Heart disease Sister (1)     Other (pacemaker) Sister (1)     Heart disease Brother Skye     Atrial fibrillation Brother Gilberto        CURRENT MEDICATIONS  Current Outpatient Medications   Medication Sig Dispense Refill    ALPRAZolam (Xanax) 0.5 mg tablet Take 1.5 oral tablets in the morning, 1.5 tablets at midday, and 4 tablets at bedtime as needed 210 tablet 1    ALPRAZolam (Xanax) 0.5 mg tablet Take 1-2 tablets by mouth  in the morning, 1-2 tablets at midday, and 4 tablets at bedtime as needed 240 tablet 1    ALPRAZolam (Xanax) 0.5 mg tablet Take 1-2 tablets by mouth in the morning, take 1-2 tablets at midday, and take  4 tablets at bedtime as needed 240 tablet 1    amLODIPine (Norvasc) 5 mg tablet Take 1 tablet (5 mg) by mouth once daily. 90 tablet 3    carvedilol (Coreg) 12.5 mg tablet TAKE 1 TABLET BY MOUTH TWO TIMES A DAY WITH FOOD 180 tablet 3    cholecalciferol (Vitamin D-3) 50 mcg (2,000 unit) capsule Take 1 capsule (50 mcg) by mouth early in the morning..      dapagliflozin propanediol (Farxiga) 10 mg Take 1 tablet (10 mg) by mouth once daily. 30 tablet 11    diclofenac sodium (Voltaren) 1 % gel Apply 1 Application topically 4 times a day as needed (as needed for pain). 100 g 3    furosemide (Lasix) 40 mg tablet TAKE ONE TABLET BY MOUTH ONCE A DAY 90 tablet 3    levothyroxine (Synthroid) 150 mcg tablet Take 1 tablet (150 mcg) by mouth see administration instructions. MONDAY through SATURDAY, and take 1 AND 1/2 tablets on SUNDAY Orally as directed 96 tablet 1    potassium chloride CR (Klor-Con) 10 mEq ER tablet TAKE ONE TABLET BY MOUTH WITH FOOD TWICE A  tablet 2    sacubitriL-valsartan (Entresto)  mg tablet Take 1 tablet by mouth 2 times a day. 180 tablet 3    terbinafine (LamISIL) 1 % cream Apply topically  2 times a day. 15 g 0    triamcinolone (Kenalog) 0.1 % cream Apply topically to affected area 1-2 times daily as needed. Avoid face and groin. 30 g 1    venlafaxine XR (Effexor-XR) 150 mg 24 hr capsule Take 1 capsule by mouth once a day (take with 37.5mg capsule for a total of 187.5mg daily) 30 capsule 1    venlafaxine XR (Effexor-XR) 150 mg 24 hr capsule Take 1 capsule by mouth  once a day (take with 37.5mg capsule for a total of 187.5mg daily) 30 capsule 1    venlafaxine XR (Effexor-XR) 150 mg 24 hr capsule Take 1 capsule by mouth once a day (take with 37.5mg capsule for a total of 187.5mg daily) 30 capsule 1    venlafaxine XR (Effexor-XR) 150 mg 24 hr capsule Take 1 capsule by mouth once a day (take with 37.5mg capsule for a total of 187.5mg daily) 30 capsule 1    venlafaxine XR (Effexor-XR) 37.5 mg 24 hr capsule Take 1 capsule by mouth once a day (take with 150mg capsule for a total of 187.5mg daily) 30 capsule 1    venlafaxine XR (Effexor-XR) 37.5 mg 24 hr capsule Take 1 capsule by mouth once a day (take with 150mg capsule for a total of 187.5mg daily) 30 capsule 1    venlafaxine XR (Effexor-XR) 37.5 mg 24 hr capsule Take 1 capsule by mouth once a day (take with 150mg capsule for a total of 187.5mg daily) 30 capsule 1    venlafaxine XR (Effexor-XR) 37.5 mg 24 hr capsule Take 1 capsule by mouth once a day (take with 150mg capsule for a total of 187.5mg daily) 30 capsule 1    hydrOXYzine HCL (Atarax) 25 mg tablet Take 1 tablet (25 mg) by mouth 3 times a day. 12 tablet 1    venlafaxine 150 mg 24 hr tablet Take 1 tablet by mouth once a day (Patient not taking: Reported on 6/19/2024) 30 tablet 1    venlafaxine 150 mg 24 hr tablet Take 1 tabletby mouth once a day (take with 37.5mg capsule for a total of 187.5mg daily) 30 tablet 1     No current facility-administered medications for this visit.       ALLERGIES AND DRUG REACTIONS  Allergies   Allergen Reactions    Latex Rash    Sulfa (Sulfonamide Antibiotics) Rash and  "Unknown          OBJECTIVE  Visit Vitals  /88   Pulse 91   Resp 18   Ht 1.803 m (5' 11\")   Wt 103 kg (228 lb)   SpO2 97%   BMI 31.80 kg/m²   Smoking Status Former   BSA 2.27 m²       Last Recorded Pain Score (if available):                Physical Exam  General: Sitting in chair, NAD, antalgic gait with cane and hunched forward  Head: NCAT  Eyes: Sclera/conjunctiva clear, EOMI, PERRL  Nose/mouth: MMM  CV: Good distal pulses  Lungs: Good/equal chest excursion  Abdomen: Soft, ND  Ext: No cyanosis/edema  MSK: L-spine alignment: unremarkable, BL paraspinal m TTP, L-spine ROM: ext limited by pain  Neuro: AAOx3   Dermatome sensation to light touch  LEFT L1 (lower pelvis/upper thigh): WNL    RIGHT L1: WNL      LEFT L2 (upper thigh): WNL       RIGHT: L2:WNL      LEFT L3 (medial knee): WNL       RIGHT L3: WNL      LEFT L4 (superior medial malleolus): WNL       RIGHT L4: WNL      LEFT L5 (dorsal foot): WNL       RIGHT L5: WNL      LEFT S1 (lateral foot): WNL     RIGHT S1: WNL      LEFT S2 (popliteal fossa): WNL    RIGHT S2: WNL        Motor strength  LEFT L2 (hip flexion): 5/5   RIGHT L2: 5/5  LEFT L3 (knee extension): 5/5     RIGHT L3: 5/5  LEFT L4 (dorsiflexion): 5/5     RIGHT L4: 5/5  LEFT L5 (EHL extension): 5/5     RIGHT L5: 5/5  LEFT S1 (plantarflexion): 5/5     RIGHT S1: 5/5  LEFT S2 (knee flexion): 5/5      RIGHT S2: 5/5    Special testing  DTR diminished patellar reflexes  Seated slump test neg BL  Clonus: neg BL  Babinski: neg BL    Psych: affect nl, agitated  Skin: BL groin rash      REVIEW OF LABORATORY DATA  I have reviewed the following lab results:  WBC   Date Value Ref Range Status   11/22/2022 4.2 (L) 4.5 - 11.0 K/UL Final     RBC   Date Value Ref Range Status   11/22/2022 3.53 (L) 4.5 - 5.5 M/UL Final     Hemoglobin   Date Value Ref Range Status   11/22/2022 12.5 (L) 13.5 - 16.5 GM/DL Final     Hematocrit   Date Value Ref Range Status   11/22/2022 39.6 (L) 41 - 50 % Final     MCV   Date Value Ref Range " "Status   11/22/2022 112.2 (H) 80 - 100 FL Final     Comment:     CONSISTENT WITH PREVIOUS RESULT   RESULT CHECKED       MCH   Date Value Ref Range Status   11/22/2022 35.4 (H) 26 - 34 PG Final     MCHC   Date Value Ref Range Status   11/22/2022 31.6 31 - 37 % Final     Platelets   Date Value Ref Range Status   11/22/2022 153 150 - 450 K/UL Final     MPV   Date Value Ref Range Status   11/22/2022 9.4 7.0 - 12.6 CU Final     Sodium   Date Value Ref Range Status   03/04/2024 139 133 - 145 mmol/L Final     Potassium   Date Value Ref Range Status   03/04/2024 4.7 3.4 - 5.1 mmol/L Final     Bicarbonate   Date Value Ref Range Status   03/04/2024 28 24 - 31 mmol/L Final     Urea Nitrogen   Date Value Ref Range Status   03/04/2024 23 8 - 25 mg/dL Final     Calcium   Date Value Ref Range Status   03/04/2024 9.1 8.5 - 10.4 mg/dL Final     No results found for: \"PROTIME\", \"PTT\", \"INR\", \"FIBRINOGEN\"      REVIEW OF RADIOLOGY   I have reviewed the following:  Radiology Studies           N/A       ASSESSMENT & PLAN  Todd Small is a 59 y.o. old male with PMH CHF 2/2 former EtOH use, former smoker, CKD, MDD, papillary thyroid ca s/p thyroidectomy/RT with hypothyroidism who presents as new patient referred by Naveen Morales DO with LB pain    1) Lumbar spinal stenosis  -LBP radiating down BLE with standing/walking affecting gait, function, ADLs, and QoL  -Refractive to Tylenol, biofreeze, SNRI  -NSAIDs contraindicated 2/2 CKD. MDP may worsen rash  -Unable to meaningfully participate in PT 2/2 pain severity and late-stage CHF  -MRI L-spine to eval neuraxial pathology   -Norco 5 mg TID PRN (15 MME) x14 d in meantime  -F/U 2 w. Pt wishes to see physician at [place] office    2) BL calf pain/swelling  -Since 2 w. Aside from baseline chronic CP and VENEGAS, denies worsening sx  -STAT BLE venous Duplex r/o DVT    3) Groin rash  -Possibly recurrent tinea cruris. Reiterated that as a pain physician, I recommend he follow-up with his " PCP for possible new prescription of antifungal. In meantime, can try OTC zinc oxide (Bucky)               Nathaly Cantrell MD  Anesthesiologist & Interventional Pain Physician   [facility] [place]  O: [phone]  F: [phone]  2:32 PM  06/19/24

## 2024-06-20 PROBLEM — M48.061 SPINAL STENOSIS OF LUMBAR REGION WITHOUT NEUROGENIC CLAUDICATION: Status: ACTIVE | Noted: 2024-06-20

## 2024-06-20 RX ORDER — PRENATAL VIT 91/IRON/FOLIC/DHA 28-975-200
COMBINATION PACKAGE (EA) ORAL 2 TIMES DAILY
Qty: 15 G | Refills: 0 | Status: SHIPPED | OUTPATIENT
Start: 2024-06-20

## 2024-06-21 ENCOUNTER — PHARMACY VISIT (OUTPATIENT)
Dept: PHARMACY | Facility: CLINIC | Age: 59
End: 2024-06-21
Payer: MEDICAID

## 2024-06-21 DIAGNOSIS — G89.29 CHRONIC BILATERAL LOW BACK PAIN, UNSPECIFIED WHETHER SCIATICA PRESENT: ICD-10-CM

## 2024-06-21 DIAGNOSIS — M54.50 CHRONIC BILATERAL LOW BACK PAIN, UNSPECIFIED WHETHER SCIATICA PRESENT: ICD-10-CM

## 2024-06-21 PROCEDURE — RXMED WILLOW AMBULATORY MEDICATION CHARGE

## 2024-06-21 RX ORDER — HYDROCODONE BITARTRATE AND ACETAMINOPHEN 5; 325 MG/1; MG/1
1 TABLET ORAL 3 TIMES DAILY PRN
Qty: 42 TABLET | Refills: 0 | Status: CANCELLED | OUTPATIENT
Start: 2024-06-21 | End: 2024-07-05

## 2024-06-24 ENCOUNTER — TELEPHONE (OUTPATIENT)
Dept: PAIN MEDICINE | Facility: CLINIC | Age: 59
End: 2024-06-24
Payer: COMMERCIAL

## 2024-06-24 NOTE — TELEPHONE ENCOUNTER
"Pt calls stating the Norco prescrbed hasn't worked after a few days. States \"I have 33 of the 42 tablets left.\" States he is trying to get an earlier appointment but unsuccessful. States he is leaving out of town 07/08/2024.   Please advise.  "

## 2024-06-25 NOTE — TELEPHONE ENCOUNTER
Prescription was sent on 6/20/24   Detail Level: Detailed Show Applicator Variable?: Yes Post-Care Instructions: I reviewed with the patient in detail post-care instructions. Patient is to wear sunprotection, and avoid picking at any of the treated lesions. Pt may apply Vaseline to crusted or scabbing areas. Consent: The patient's consent was obtained including but not limited to risks of crusting, scabbing, blistering, scarring, darker or lighter pigmentary change, recurrence, incomplete removal and infection. Render Note In Bullet Format When Appropriate: No Duration Of Freeze Thaw-Cycle (Seconds): 0

## 2024-06-27 PROCEDURE — RXMED WILLOW AMBULATORY MEDICATION CHARGE

## 2024-06-27 RX ORDER — AMOXICILLIN AND CLAVULANATE POTASSIUM 875; 125 MG/1; MG/1
875 TABLET, FILM COATED ORAL 2 TIMES DAILY
Qty: 20 TABLET | Refills: 0 | Status: SHIPPED | OUTPATIENT
Start: 2024-06-27 | End: 2024-07-15

## 2024-06-27 NOTE — TELEPHONE ENCOUNTER
Oral antibiotic sent for if symptoms persist. Would otherwise recommend office follow up if symptoms evolve

## 2024-06-28 ENCOUNTER — PHARMACY VISIT (OUTPATIENT)
Dept: PHARMACY | Facility: CLINIC | Age: 59
End: 2024-06-28
Payer: MEDICAID

## 2024-07-01 ENCOUNTER — PHARMACY VISIT (OUTPATIENT)
Dept: PHARMACY | Facility: CLINIC | Age: 59
End: 2024-07-01
Payer: MEDICAID

## 2024-07-01 ENCOUNTER — OFFICE VISIT (OUTPATIENT)
Dept: PAIN MEDICINE | Facility: CLINIC | Age: 59
End: 2024-07-01
Payer: COMMERCIAL

## 2024-07-01 VITALS
RESPIRATION RATE: 18 BRPM | SYSTOLIC BLOOD PRESSURE: 116 MMHG | HEART RATE: 85 BPM | WEIGHT: 228 LBS | DIASTOLIC BLOOD PRESSURE: 69 MMHG | HEIGHT: 71 IN | BODY MASS INDEX: 31.92 KG/M2

## 2024-07-01 DIAGNOSIS — Z79.899 HISTORY OF ONGOING TREATMENT WITH HIGH-RISK MEDICATION: Primary | ICD-10-CM

## 2024-07-01 DIAGNOSIS — I50.9 CONGESTIVE HEART FAILURE, UNSPECIFIED HF CHRONICITY, UNSPECIFIED HEART FAILURE TYPE (MULTI): ICD-10-CM

## 2024-07-01 DIAGNOSIS — M48.061 SPINAL STENOSIS OF LUMBAR REGION WITHOUT NEUROGENIC CLAUDICATION: ICD-10-CM

## 2024-07-01 DIAGNOSIS — F17.200 TOBACCO DEPENDENCE SYNDROME: ICD-10-CM

## 2024-07-01 DIAGNOSIS — B35.6 TINEA CRURIS: ICD-10-CM

## 2024-07-01 PROCEDURE — RXMED WILLOW AMBULATORY MEDICATION CHARGE

## 2024-07-01 PROCEDURE — 99214 OFFICE O/P EST MOD 30 MIN: CPT | Performed by: PHYSICIAN ASSISTANT

## 2024-07-01 PROCEDURE — 3074F SYST BP LT 130 MM HG: CPT | Performed by: PHYSICIAN ASSISTANT

## 2024-07-01 PROCEDURE — 1036F TOBACCO NON-USER: CPT | Performed by: PHYSICIAN ASSISTANT

## 2024-07-01 PROCEDURE — 3078F DIAST BP <80 MM HG: CPT | Performed by: PHYSICIAN ASSISTANT

## 2024-07-01 ASSESSMENT — LIFESTYLE VARIABLES
SKIP TO QUESTIONS 9-10: 1
HOW OFTEN DO YOU HAVE A DRINK CONTAINING ALCOHOL: NEVER
HOW MANY STANDARD DRINKS CONTAINING ALCOHOL DO YOU HAVE ON A TYPICAL DAY: PATIENT DOES NOT DRINK
HOW OFTEN DO YOU HAVE SIX OR MORE DRINKS ON ONE OCCASION: NEVER
HOW OFTEN DO YOU HAVE SIX OR MORE DRINKS ON ONE OCCASION: NEVER
HOW MANY STANDARD DRINKS CONTAINING ALCOHOL DO YOU HAVE ON A TYPICAL DAY: PATIENT DOES NOT DRINK
AUDIT-C TOTAL SCORE: 0
AUDIT-C TOTAL SCORE: 0
SKIP TO QUESTIONS 9-10: 1
HOW OFTEN DO YOU HAVE A DRINK CONTAINING ALCOHOL: NEVER

## 2024-07-01 ASSESSMENT — ENCOUNTER SYMPTOMS
PAIN: 1
ACTIVITY CHANGE: 1
BACK PAIN: 1

## 2024-07-01 ASSESSMENT — PATIENT HEALTH QUESTIONNAIRE - PHQ9
1. LITTLE INTEREST OR PLEASURE IN DOING THINGS: NOT AT ALL
2. FEELING DOWN, DEPRESSED OR HOPELESS: NOT AT ALL
1. LITTLE INTEREST OR PLEASURE IN DOING THINGS: NOT AT ALL
SUM OF ALL RESPONSES TO PHQ9 QUESTIONS 1 & 2: 0
2. FEELING DOWN, DEPRESSED OR HOPELESS: NOT AT ALL
SUM OF ALL RESPONSES TO PHQ9 QUESTIONS 1 & 2: 0

## 2024-07-01 ASSESSMENT — PAIN - FUNCTIONAL ASSESSMENT: PAIN_FUNCTIONAL_ASSESSMENT: 0-10

## 2024-07-01 ASSESSMENT — PAIN SCALES - GENERAL
PAINLEVEL: 7
PAINLEVEL_OUTOF10: 7

## 2024-07-01 NOTE — PROGRESS NOTES
MEDICATION NAME: Hydrocodone   STRENGTH: 5-325  LAST FILL DATE: 24  DATE LAST TAKEN: 24  QUANTITY FILLED: 42  QUANTITY REMAININ  COUNT COMPLETED BY: SUSANNA HENDERSON and CARLOS Myers      UDS LAST COMPLETED:   CONTROLLED SUBSTANCES AGREEMENT LAST SIGNED:   ORT LAST COMPLETED:  Modified Osw[employer] disability form filled out annually.

## 2024-07-01 NOTE — PROGRESS NOTES
Pt is a Subjective   Patient ID: Todd Small is a 59 y.o. male who presents for Pain.  pt is a 58 y/o male with spinal stenosis neurogenic claudication congestive heart failure and tinea cruris who presents today for follow-up.  Patient's MRI scheduled for Wednesday.  Continues to have low back pain rating into the bilateral posterior extremities hypoesthesias to the anterior right thigh.  Patient states the hydrocodone has not provided relief.  Patient also reports he has remaining Percocet at home and other medications that he does plan on taking to the local [facility] for destruction.  He presented today with 33 tablets of his hydrocodone that he no longer wants as it did not provide relief of any of his symptoms.  He did have the ultrasounds completed that were requested.  Patient states he does not have much more than about a year to live due to his chronic medical conditions and he would like to have about 50% relief of his symptoms.  He is also requesting evaluation of his genitalia irritation redness and pruritus    Pain        Review of Systems   Constitutional:  Positive for activity change.   Genitourinary:  Positive for genital sores.   Musculoskeletal:  Positive for back pain and gait problem.   Psychiatric/Behavioral:  Negative for suicidal ideas.        Objective   Physical Exam  Musculoskeletal:      Comments: She does have a flexed forward gait hyperesthesias along the right anterior thigh utilizing a cane for ambulation quick position changes wincing from pain         Assessment/Plan   Problem List Items Addressed This Visit             ICD-10-CM    Congestive heart failure (Multi) I50.9    Tobacco dependence syndrome F17.200    Tinea cruris B35.6    Spinal stenosis of lumbar region without neurogenic claudication M48.061     Other Visit Diagnoses         Codes    History of ongoing treatment with high-risk medication    -  Primary Z79.899    Relevant Orders    oral tox screen; [facility]; [id number] -  Miscellaneous Test        I had nice discussion with the patient today our plan will be as follows.      Radiology: [ none at this time ]      Physically:  [ continue modification of activities, healthy lifestyle choice, per the policy of the hospital as chaperone must be present I only had a female representative I think patient should go to his primary care for evaluation of the continued rash within his genitalia exam deferred today]      Psychologically:  [ No acute psychological concerns, continue with your psychological provider's care]      Medication: [33 tablets of hydrocodone or destroyed via charcoal destruction bag today in the presence of the patient and RN Lyly HEWITT  I did inform him that once MRI is complete Dr. Sana Isaac will review and hopefully be able to send him a different therapeutic agent that might be better for his pain control.  I did inform him that it would be prudent to destruct the remaining medications that he has at home so that he does not have any urged to utilize these medications.  We did talk about how Xanax and opiate analgesics have to be used very cautiously for risks of potentially respiratory suppression.  OARRS has been reviewed.  Patient submitted a toxicology screen today]      Duration:  [ 7/15/24 330pm go over MRI]      Intervention:  [Dr. Sana Isaac will review his MRI and make therapeutic recommendations I will review this MRI with the patient and discussed these potential interventional options.  Dr. Nila Isaac will have to make assessment on what is the best therapeutic agent to provide the patient for potential relief of symptoms. ]           Kd Salinas PA-C 07/01/24 12:36 PM

## 2024-07-03 ENCOUNTER — HOSPITAL ENCOUNTER (OUTPATIENT)
Dept: RADIOLOGY | Facility: HOSPITAL | Age: 59
Discharge: HOME | End: 2024-07-03
Payer: COMMERCIAL

## 2024-07-03 DIAGNOSIS — M54.50 CHRONIC BILATERAL LOW BACK PAIN, UNSPECIFIED WHETHER SCIATICA PRESENT: ICD-10-CM

## 2024-07-03 DIAGNOSIS — G89.29 CHRONIC BILATERAL LOW BACK PAIN, UNSPECIFIED WHETHER SCIATICA PRESENT: ICD-10-CM

## 2024-07-03 PROCEDURE — 72148 MRI LUMBAR SPINE W/O DYE: CPT

## 2024-07-05 ENCOUNTER — E-VISIT (OUTPATIENT)
Dept: PRIMARY CARE | Facility: CLINIC | Age: 59
End: 2024-07-05
Payer: COMMERCIAL

## 2024-07-05 DIAGNOSIS — M48.061 SPINAL STENOSIS OF LUMBAR REGION WITHOUT NEUROGENIC CLAUDICATION: Primary | ICD-10-CM

## 2024-07-12 ENCOUNTER — TELEPHONE (OUTPATIENT)
Dept: CARDIOLOGY | Facility: CLINIC | Age: 59
End: 2024-07-12
Payer: COMMERCIAL

## 2024-07-12 LAB — SCAN RESULT: NORMAL

## 2024-07-12 NOTE — TELEPHONE ENCOUNTER
Pt called the office concern about recurrent Fungal infection due to farxiga   Pt is currently on Augment 875 mg tab   Pt stated he last had this INFECTION BACK 02/2024   Pt would like to discontinue this medication would like to speak to you about a alternative before stoping   Pt is currently out of town will return back to [place] Sunday   Pt would like a call back from you to discuss alternative   Please advice   Good call back number   [phone]

## 2024-07-15 ENCOUNTER — OFFICE VISIT (OUTPATIENT)
Dept: PAIN MEDICINE | Facility: CLINIC | Age: 59
End: 2024-07-15
Payer: COMMERCIAL

## 2024-07-15 ENCOUNTER — APPOINTMENT (OUTPATIENT)
Dept: PAIN MEDICINE | Facility: CLINIC | Age: 59
End: 2024-07-15
Payer: COMMERCIAL

## 2024-07-15 ENCOUNTER — PHARMACY VISIT (OUTPATIENT)
Dept: PHARMACY | Facility: CLINIC | Age: 59
End: 2024-07-15
Payer: MEDICAID

## 2024-07-15 VITALS
BODY MASS INDEX: 31.92 KG/M2 | SYSTOLIC BLOOD PRESSURE: 148 MMHG | HEART RATE: 103 BPM | HEIGHT: 71 IN | WEIGHT: 228 LBS | RESPIRATION RATE: 20 BRPM | DIASTOLIC BLOOD PRESSURE: 98 MMHG | OXYGEN SATURATION: 98 %

## 2024-07-15 DIAGNOSIS — M48.061 SPINAL STENOSIS OF LUMBAR REGION WITHOUT NEUROGENIC CLAUDICATION: Primary | ICD-10-CM

## 2024-07-15 DIAGNOSIS — M54.16 LUMBAR RADICULOPATHY: ICD-10-CM

## 2024-07-15 PROCEDURE — 1036F TOBACCO NON-USER: CPT | Performed by: PHYSICIAN ASSISTANT

## 2024-07-15 PROCEDURE — 99214 OFFICE O/P EST MOD 30 MIN: CPT | Performed by: PHYSICIAN ASSISTANT

## 2024-07-15 PROCEDURE — 3077F SYST BP >= 140 MM HG: CPT | Performed by: PHYSICIAN ASSISTANT

## 2024-07-15 PROCEDURE — RXMED WILLOW AMBULATORY MEDICATION CHARGE

## 2024-07-15 PROCEDURE — 3080F DIAST BP >= 90 MM HG: CPT | Performed by: PHYSICIAN ASSISTANT

## 2024-07-15 RX ORDER — VENLAFAXINE HYDROCHLORIDE 37.5 MG/1
CAPSULE, EXTENDED RELEASE ORAL
Qty: 30 CAPSULE | Refills: 1 | OUTPATIENT
Start: 2024-07-15

## 2024-07-15 RX ORDER — VENLAFAXINE HYDROCHLORIDE 150 MG/1
CAPSULE, EXTENDED RELEASE ORAL
Qty: 30 CAPSULE | Refills: 1 | OUTPATIENT
Start: 2024-07-15

## 2024-07-15 RX ORDER — SODIUM CHLORIDE, SODIUM LACTATE, POTASSIUM CHLORIDE, CALCIUM CHLORIDE 600; 310; 30; 20 MG/100ML; MG/100ML; MG/100ML; MG/100ML
20 INJECTION, SOLUTION INTRAVENOUS CONTINUOUS
OUTPATIENT
Start: 2024-07-15

## 2024-07-15 RX ORDER — ALPRAZOLAM 0.5 MG/1
TABLET ORAL
Qty: 240 TABLET | Refills: 1 | OUTPATIENT
Start: 2024-07-15

## 2024-07-15 RX ORDER — OXYCODONE AND ACETAMINOPHEN 7.5; 325 MG/1; MG/1
1 TABLET ORAL EVERY 8 HOURS PRN
Qty: 90 TABLET | Refills: 0 | Status: SHIPPED | OUTPATIENT
Start: 2024-07-15 | End: 2024-08-14

## 2024-07-15 ASSESSMENT — PAIN DESCRIPTION - DESCRIPTORS: DESCRIPTORS: NUMBNESS;ACHING

## 2024-07-15 ASSESSMENT — PAIN - FUNCTIONAL ASSESSMENT: PAIN_FUNCTIONAL_ASSESSMENT: 0-10

## 2024-07-15 ASSESSMENT — ENCOUNTER SYMPTOMS
BACK PAIN: 1
ACTIVITY CHANGE: 1

## 2024-07-15 ASSESSMENT — PATIENT HEALTH QUESTIONNAIRE - PHQ9
10. IF YOU CHECKED OFF ANY PROBLEMS, HOW DIFFICULT HAVE THESE PROBLEMS MADE IT FOR YOU TO DO YOUR WORK, TAKE CARE OF THINGS AT HOME, OR GET ALONG WITH OTHER PEOPLE: NOT DIFFICULT AT ALL
2. FEELING DOWN, DEPRESSED OR HOPELESS: SEVERAL DAYS
1. LITTLE INTEREST OR PLEASURE IN DOING THINGS: NOT AT ALL
SUM OF ALL RESPONSES TO PHQ9 QUESTIONS 1 AND 2: 1

## 2024-07-15 ASSESSMENT — PAIN SCALES - GENERAL
PAINLEVEL_OUTOF10: 8
PAINLEVEL: 8

## 2024-07-15 NOTE — TELEPHONE ENCOUNTER
Called Pt  back he expressed understranding pt was concern about stopping Farxiga that  his chf would get worse.  Please advice

## 2024-07-15 NOTE — PROGRESS NOTES
Subjective   Patient ID: Todd Small is a 59 y.o. male who presents for No chief complaint on file..  Is a 59-year-old male here today for MRI review.  Patient does not during his travels he did have an increase in aching in his shoulders.  He has been using topical diclofenac into this area.  This has been going on for the past 24 hours.  Patient continues to have his lower extremity symptoms.  He denies any major changes in variation.  Patient denotes that he is still struggling with pain.  Patient's other medical conditions are beginning to worsen.  Does note that his primary care has him on amoxicillin and will be on day #8 as of today.  Patient has a 10-day course        Review of Systems   Constitutional:  Positive for activity change.   Genitourinary:  Positive for genital sores.   Musculoskeletal:  Positive for back pain and gait problem.   Psychiatric/Behavioral:  Negative for suicidal ideas.        Objective   Physical Exam  Musculoskeletal:      Comments: She does have a flexed forward gait hyperesthesias along the right anterior thigh utilizing a cane for ambulation quick position changes wincing from pain       Assessment/Plan   Problem List Items Addressed This Visit             ICD-10-CM    Spinal stenosis of lumbar region without neurogenic claudication - Primary M48.061     Other Visit Diagnoses         Codes    Lumbar radiculopathy     M54.16    Relevant Orders    Transforaminal    [place] pain management          I had nice discussion with the patient today our plan will be as follows.      Radiology: [ none at this time ]      Physically:  [ continue modification of activities, healthy lifestyle choice ]      Psychologically:  [ No acute psychological concerns ]      Medication: [I will refill the medications at the same dose and frequency. We will continue to monitor the patient monthly for compliance, adverse reaction or interactions The patient continues to see benefit and improvement in their  quality of life and ability to maintain ADLs. Patient educated about the risks of taking opioids and operating a motor vehicle. Patient reports no adverse side effects to current medication regimen. Current regimen does allow patient to maintain ADLs. Oarrs has been reviewed. No suspicion of diversion or abuse. Compliance with medication regime, no use of illicit drugs, no sharing of narcotic medications with others, do not use others narcotic medication, and to avoid alcohol use. Patient has been educated on the risks, benefits, and alternatives of controlled substances as well as the proper way to store these medications.   The patient and I discussed the nature of this medication and its side effects. We discussed tolerance, physical dependence, psychological dependence, addiction and opioid-induced hyperalgesia  Toxicology screen reviewed and compliant]      Duration:  [ 1 month ]      Intervention:  [ after reviewing imaging with Dr charlton we feel that a LTR bilateral L3/4 fluoro guidance with Dr charlton. Pt would like to move forward]         Kd Salinas PA-C 07/15/24 3:21 PM

## 2024-07-18 ENCOUNTER — APPOINTMENT (OUTPATIENT)
Dept: PAIN MEDICINE | Facility: CLINIC | Age: 59
End: 2024-07-18
Payer: COMMERCIAL

## 2024-07-24 ENCOUNTER — TELEPHONE (OUTPATIENT)
Dept: PAIN MEDICINE | Facility: CLINIC | Age: 59
End: 2024-07-24
Payer: COMMERCIAL

## 2024-07-24 ENCOUNTER — PHARMACY VISIT (OUTPATIENT)
Dept: PHARMACY | Facility: CLINIC | Age: 59
End: 2024-07-24
Payer: MEDICAID

## 2024-07-24 PROBLEM — G54.2 DISORDER OF RIGHT CERVICAL NERVE ROOT: Status: ACTIVE | Noted: 2024-07-24

## 2024-07-24 PROBLEM — J06.9 VIRAL UPPER RESPIRATORY TRACT INFECTION: Status: ACTIVE | Noted: 2024-07-24

## 2024-07-24 PROBLEM — E89.0 POSTOPERATIVE HYPOTHYROIDISM: Status: ACTIVE | Noted: 2022-11-17

## 2024-07-24 PROBLEM — M25.559 ARTHRALGIA OF HIP: Status: ACTIVE | Noted: 2024-07-24

## 2024-07-24 PROBLEM — F10.929 ALCOHOLIC INTOXICATION (CMS-HCC): Status: ACTIVE | Noted: 2024-07-24

## 2024-07-24 PROBLEM — F32.A DEPRESSIVE DISORDER: Status: ACTIVE | Noted: 2024-07-24

## 2024-07-24 PROBLEM — C73 CARCINOMA OF THYROID GLAND (MULTI): Status: ACTIVE | Noted: 2024-07-24

## 2024-07-24 PROBLEM — J32.9 SINUSITIS: Status: ACTIVE | Noted: 2024-07-24

## 2024-07-24 PROBLEM — F10.90 ALCOHOL USE, UNSPECIFIED, UNCOMPLICATED: Status: ACTIVE | Noted: 2022-11-17

## 2024-07-24 PROBLEM — I42.9 CARDIOMYOPATHY (MULTI): Status: ACTIVE | Noted: 2022-11-17

## 2024-07-24 PROCEDURE — RXMED WILLOW AMBULATORY MEDICATION CHARGE

## 2024-07-27 ENCOUNTER — PHARMACY VISIT (OUTPATIENT)
Dept: PHARMACY | Facility: CLINIC | Age: 59
End: 2024-07-27
Payer: MEDICAID

## 2024-07-27 PROCEDURE — RXOTC WILLOW AMBULATORY OTC CHARGE

## 2024-07-27 PROCEDURE — RXMED WILLOW AMBULATORY MEDICATION CHARGE

## 2024-07-30 ENCOUNTER — ANCILLARY PROCEDURE (OUTPATIENT)
Dept: RADIOLOGY | Facility: EXTERNAL LOCATION | Age: 59
End: 2024-07-30
Payer: COMMERCIAL

## 2024-07-30 DIAGNOSIS — M54.16 RADICULOPATHY, LUMBAR REGION: ICD-10-CM

## 2024-07-30 PROCEDURE — 64483 NJX AA&/STRD TFRM EPI L/S 1: CPT | Performed by: ANESTHESIOLOGY

## 2024-07-30 NOTE — PROGRESS NOTES
OPERATIVE NOTE  Preprocedure diagnosis: Lumbar radic  Postprocedure diagnosis Lumbar radic    Procedure performed: BL L3-4 TFESI  Physician: Nathaly Cantrell MD  Anesthesia: Local  Complications: none  Blood loss:  Nil    Clinical note: This is a 59 y.o. male with PMH CHF 2/2 former EtOH use, former smoker, CKD, MDD, papillary thyroid ca s/p thyroidectomy/RT with hypothyroidism who suffers with LBP and leg pain here meeting all medical criteria for above-mentioned procedure. Patient's pain stable and persistent from last visit.  No personal/family hx issues with anesthesia. Denies allergies to Latex, steroids, local anesthetics, or iodine/contrast. Denies being on blood thinners. Not diabetic.  Denies fever, chills, NS, CP, SOB, cough, N/V.    Discussed procedure risks/benefits in detail with patient. Pt meets medical necessity for procedure due to failure of conservative measures. Reviewed procedural risks including bleeding, infection, nerve damage, paralysis. Also reviewed mitigating factors such as screening for infection/blood thinner use, sterile precautions, and image-guidance when applicable. All questions answered. Pt/guardian expressed understanding and choose to proceed      Procedure:    PROCEDURE  Bilateral L3-4 transforaminal epidural steroid injection    LOCATION: [facility]  Anesthesia: Local    Informed consent completed.  Patient brought to procedure suite and positioned on table prone.  Lower back prepped with chlorhexidine and draped in sterile fashion.  Fluoroscopy utilized to identify L3-4 level.  C-arm was obliqued to left side to visualize L3-4 foramen.  1.5 inch 25-gauge needle used to administer 1% lidocaine for local anesthesia.  Then, 3.5 inch 22-gauge needle were advanced coaxially to superolateral aspect of foramina via x-ray guidance.  AP view utilized to visualize needles approaching junction of the transverse process and vertebral body.  Lateral view taken to visualize needles  just anterior to posterior spinal column.  Needle was then advanced slightly and after negative add aspiration, 1.5 cc Omnipaque 240 administered at each level revealing adequate epidural spread without vascular uptake.  AP view taken to confirm epidural spread medially without vascular uptake.     After negative aspiration, 7.5 mg dexamethasone +1.25  cc preservative-free saline injected.      Procedure was then repeated in similar fashion on the right side without issue.       Total Injectate: 15 mg dexamethasone + 2.5 ml PF NS    Needles re-styleted and removed.  Hemostasis achieved.  Band-Aids placed.      Sedation: N/A    Patient tolerated procedure well and without issue.  They were discharged home in stable condition.      Nathaly Cantrell MD  Anesthesiologist & Interventional Pain Physician   [facility] [place]  O: [phone]  F: [phone]  8:37 AM  07/30/24

## 2024-08-02 ENCOUNTER — APPOINTMENT (OUTPATIENT)
Dept: PAIN MEDICINE | Facility: CLINIC | Age: 59
End: 2024-08-02
Payer: COMMERCIAL

## 2024-08-12 ENCOUNTER — OFFICE VISIT (OUTPATIENT)
Dept: PAIN MEDICINE | Facility: CLINIC | Age: 59
End: 2024-08-12
Payer: COMMERCIAL

## 2024-08-12 VITALS
HEIGHT: 71 IN | SYSTOLIC BLOOD PRESSURE: 149 MMHG | DIASTOLIC BLOOD PRESSURE: 94 MMHG | HEART RATE: 93 BPM | BODY MASS INDEX: 31.92 KG/M2 | WEIGHT: 228 LBS | RESPIRATION RATE: 18 BRPM

## 2024-08-12 DIAGNOSIS — M48.061 SPINAL STENOSIS OF LUMBAR REGION WITHOUT NEUROGENIC CLAUDICATION: ICD-10-CM

## 2024-08-12 DIAGNOSIS — Z79.891 LONG TERM CURRENT USE OF OPIATE ANALGESIC: Primary | ICD-10-CM

## 2024-08-12 DIAGNOSIS — M46.1 SACROILIITIS (CMS-HCC): ICD-10-CM

## 2024-08-12 DIAGNOSIS — M54.16 LUMBAR RADICULOPATHY: ICD-10-CM

## 2024-08-12 PROCEDURE — 99214 OFFICE O/P EST MOD 30 MIN: CPT | Performed by: ANESTHESIOLOGY

## 2024-08-12 PROCEDURE — RXMED WILLOW AMBULATORY MEDICATION CHARGE

## 2024-08-12 PROCEDURE — 3080F DIAST BP >= 90 MM HG: CPT | Performed by: ANESTHESIOLOGY

## 2024-08-12 PROCEDURE — 1036F TOBACCO NON-USER: CPT | Performed by: ANESTHESIOLOGY

## 2024-08-12 PROCEDURE — 3008F BODY MASS INDEX DOCD: CPT | Performed by: ANESTHESIOLOGY

## 2024-08-12 PROCEDURE — 3077F SYST BP >= 140 MM HG: CPT | Performed by: ANESTHESIOLOGY

## 2024-08-12 RX ORDER — OXYCODONE AND ACETAMINOPHEN 7.5; 325 MG/1; MG/1
1 TABLET ORAL 4 TIMES DAILY PRN
Qty: 120 TABLET | Refills: 0 | Status: SHIPPED | OUTPATIENT
Start: 2024-08-13 | End: 2024-09-12

## 2024-08-12 RX ORDER — NALOXONE HYDROCHLORIDE 4 MG/.1ML
1 SPRAY NASAL AS NEEDED
Qty: 2 EACH | Refills: 0 | Status: SHIPPED | OUTPATIENT
Start: 2024-08-12

## 2024-08-12 ASSESSMENT — ENCOUNTER SYMPTOMS
CARDIOVASCULAR NEGATIVE: 1
CONSTITUTIONAL NEGATIVE: 1
ENDOCRINE NEGATIVE: 1
GASTROINTESTINAL NEGATIVE: 1
NUMBNESS: 1
HEMATOLOGIC/LYMPHATIC NEGATIVE: 1
BACK PAIN: 1
RESPIRATORY NEGATIVE: 1
EYES NEGATIVE: 1
PSYCHIATRIC NEGATIVE: 1
WEAKNESS: 1

## 2024-08-12 ASSESSMENT — PAIN SCALES - GENERAL
PAINLEVEL_OUTOF10: 9
PAINLEVEL: 9

## 2024-08-12 ASSESSMENT — PAIN DESCRIPTION - DESCRIPTORS: DESCRIPTORS: ACHING;NUMBNESS

## 2024-08-12 ASSESSMENT — PAIN - FUNCTIONAL ASSESSMENT: PAIN_FUNCTIONAL_ASSESSMENT: 0-10

## 2024-08-12 ASSESSMENT — PATIENT HEALTH QUESTIONNAIRE - PHQ9
SUM OF ALL RESPONSES TO PHQ9 QUESTIONS 1 AND 2: 0
1. LITTLE INTEREST OR PLEASURE IN DOING THINGS: NOT AT ALL
2. FEELING DOWN, DEPRESSED OR HOPELESS: NOT AT ALL

## 2024-08-12 ASSESSMENT — LIFESTYLE VARIABLES: TOTAL SCORE: 0

## 2024-08-12 NOTE — PROGRESS NOTES
PAIN MANAGEMENT FOLLOW-UP OFFICE NOTE    Date of Service: 8/12/2024    SUBJECTIVE    CHIEF COMPLAINT: LBP    HISTORY OF PRESENT ILLNESS    Todd Small is a 59 y.o. male with PMH CHF 2/2 former EtOH use, former smoker, CKD, MDD, papillary thyroid ca s/p thyroidectomy/RT with hypothyroidism who presents for F/U    On 7/30, pt underwent BL L3-4 TFESI with 50% relief in his legs, but 30% relief in LB. Since that time, pt reports LBP continually bothersome. Pain is worse with standing, walking.     Pt denies new-onset bowel/bladder incontinence.  Pt denies recent infection, allergy to Latex/iodine/contrast. Patient is currently taking the following blood thinner(s): N/A    Procedure log:  -BL L3-4 TFESI 7/30/24: 50% relief in his legs, but 30% relief in LB      REVIEW OF SYSTEMS  Review of Systems   Constitutional: Negative.    HENT: Negative.     Eyes: Negative.    Respiratory: Negative.     Cardiovascular: Negative.    Gastrointestinal: Negative.    Endocrine: Negative.    Musculoskeletal:  Positive for back pain.   Skin: Negative.    Neurological:  Positive for weakness and numbness.   Hematological: Negative.    Psychiatric/Behavioral: Negative.         PAST MEDICAL HISTORY  Past Medical History:   Diagnosis Date    Anxiety 4/01/23    Cancer (Multi) 10/01/0-    CHF (congestive heart failure) (Multi) 10/01/23    Depression 10/01/23    Extremity pain     Headache     Hypothyroidism     Low back pain     Thyroid cancer (Multi)      Past Surgical History:   Procedure Laterality Date    APPENDECTOMY  10/01/96    CARDIAC CATHETERIZATION      COLONOSCOPY  09/11/2020    HERNIA REPAIR      THYROIDECTOMY      VASECTOMY       Family History   Problem Relation Name Age of Onset    Heart disease Mother Kim     Heart disease Sister (1)     Other (pacemaker) Sister (1)     Heart disease Brother Skye     Atrial fibrillation Brother Gilberto        CURRENT MEDICATIONS  Current Outpatient Medications   Medication Sig Dispense  Refill    ALPRAZolam (Xanax) 0.5 mg tablet Take 1-2 tablets by mouth in the morning, 1-2 tablets at midday, and 4 tablets at bedtime as needed 240 tablet 1    ALPRAZolam (Xanax) 0.5 mg tablet Take 1-2  tablets by mouth in the morning, 1-2 tablets at midday, and 4 tablets at bedtime as needed 240 tablet 1    amLODIPine (Norvasc) 5 mg tablet Take 1 tablet (5 mg) by mouth once daily. 90 tablet 3    carvedilol (Coreg) 12.5 mg tablet TAKE 1 TABLET BY MOUTH TWO TIMES A DAY WITH FOOD 180 tablet 3    diclofenac sodium (Voltaren) 1 % gel Apply 1 Application topically 4 times a day as needed (as needed for pain). 100 g 3    furosemide (Lasix) 40 mg tablet TAKE ONE TABLET BY MOUTH ONCE A DAY 90 tablet 3    levothyroxine (Synthroid) 150 mcg tablet Take 1 tablet (150 mcg) by mouth on MONDAY through SATURDAY, and take 1 AND 1/2 tablets on SUNDAY as directed 96 tablet 1    oxyCODONE-acetaminophen (Percocet) 7.5-325 mg tablet Take 1 tablet by mouth every 8 hours if needed for severe pain (7 - 10). 90 tablet 0    potassium chloride CR (Klor-Con) 10 mEq ER tablet TAKE ONE TABLET BY MOUTH WITH FOOD TWICE A  tablet 2    sacubitriL-valsartan (Entresto)  mg tablet Take 1 tablet by mouth 2 times a day. 180 tablet 3    venlafaxine XR (Effexor-XR) 150 mg 24 hr capsule Take 1 capsule by mouth once a day (take with 37.5mg capsule for a total of 187.5mg daily) 30 capsule 1    venlafaxine XR (Effexor-XR) 150 mg 24 hr capsule Take 1 capsule by mouth  once a day (take with 37.5mg capsule for a total of 187.5mg daily) 30 capsule 1    venlafaxine XR (Effexor-XR) 150 mg 24 hr capsule Take 1 capsule by mouth once a day (take with 37.5mg capsule for a total of 187.5mg daily) 30 capsule 1    venlafaxine XR (Effexor-XR) 150 mg 24 hr capsule Take 1 capsule by mouth once a day (take with 37.5mg capsule for a total of 187.5mg daily) 30 capsule 1    venlafaxine XR (Effexor-XR) 150 mg 24 hr capsule Take 1 capsule by mouth once a day (take with  "37.5mg capsule for a total of 187.5mg daily) 30 capsule 1    venlafaxine XR (Effexor-XR) 37.5 mg 24 hr capsule Take 1 capsule by mouth once a day (take with 150mg capsule for a total of 187.5mg daily) 30 capsule 1    venlafaxine XR (Effexor-XR) 37.5 mg 24 hr capsule Take 1 capsule by mouth once a day (take with 150mg capsule for a total of 187.5mg daily) 30 capsule 1    venlafaxine XR (Effexor-XR) 37.5 mg 24 hr capsule Take 1 capsule by mouth once a day (take with 150mg capsule for a total of 187.5mg daily) 30 capsule 1    venlafaxine XR (Effexor-XR) 37.5 mg 24 hr capsule Take 1 capsule by mouth once a day (take with 150mg capsule for a total of 187.5mg daily) 30 capsule 1    venlafaxine XR (Effexor-XR) 37.5 mg 24 hr capsule Take 1 capsule by mouth once a day (take with 150mg capsule for a total of 187.5mg daily) 30 capsule 1    ALPRAZolam (Xanax) 0.5 mg tablet Take 1-2 tablets by mouth in the morning, take 1-2 tablets at midday, and take  4 tablets at bedtime as needed 240 tablet 1     No current facility-administered medications for this visit.       ALLERGIES AND DRUG REACTIONS  Allergies   Allergen Reactions    Latex Rash    Sulfa (Sulfonamide Antibiotics) Rash and Unknown          OBJECTIVE  Visit Vitals  Ht 1.803 m (5' 11\")   Wt 103 kg (228 lb)   BMI 31.80 kg/m²   Smoking Status Former   BSA 2.27 m²       Last Recorded Pain Score (if available):                Physical Exam  General: Sitting in chair, NAD, antalgic gait with cane and hunched forward  Head: NCAT  Eyes: Sclera/conjunctiva clear, EOMI, PERRL  Nose/mouth: MMM  CV: Good distal pulses  Lungs: Good/equal chest excursion  Abdomen: Soft, ND  Ext: No cyanosis/edema  MSK: L-spine alignment: unremarkable, BL paraspinal m/BL PSIS TTP, L-spine ROM: ext limited by pain  +Jaspreet BL, JEANINE BL, thigh thrust BL, Gaenslen BL    Neuro: AAOx3   Dermatome sensation to light touch  LEFT L1 (lower pelvis/upper thigh): WNL    RIGHT L1: WNL      LEFT L2 (upper thigh): " WNL       RIGHT: L2:WNL      LEFT L3 (medial knee): WNL       RIGHT L3: WNL      LEFT L4 (superior medial malleolus): WNL       RIGHT L4: WNL      LEFT L5 (dorsal foot): WNL       RIGHT L5: WNL      LEFT S1 (lateral foot): WNL     RIGHT S1: WNL      LEFT S2 (popliteal fossa): WNL    RIGHT S2: WNL        Motor strength (poor effort, but coachable)  LEFT L2 (hip flexion): 5/5   RIGHT L2: 5/5  LEFT L3 (knee extension): 5/5     RIGHT L3: 5/5  LEFT L4 (dorsiflexion): 5/5     RIGHT L4: 5/5  LEFT L5 (EHL extension): 5/5     RIGHT L5: 5/5  LEFT S1 (plantarflexion): 5/5     RIGHT S1: 5/5  LEFT S2 (knee flexion): 5/5      RIGHT S2: 5/5    Special testing  DTR diminished patellar reflexes  Seated slump test neg BL  Clonus: neg BL  Babinski: neg BL    Psych: affect nl, agitated  Skin: BL groin rash      REVIEW OF LABORATORY DATA  I have reviewed the following lab results:  WBC   Date Value Ref Range Status   11/22/2022 4.2 (L) 4.5 - 11.0 K/UL Final     RBC   Date Value Ref Range Status   11/22/2022 3.53 (L) 4.5 - 5.5 M/UL Final     Hemoglobin   Date Value Ref Range Status   11/22/2022 12.5 (L) 13.5 - 16.5 GM/DL Final     Hematocrit   Date Value Ref Range Status   11/22/2022 39.6 (L) 41 - 50 % Final     MCV   Date Value Ref Range Status   11/22/2022 112.2 (H) 80 - 100 FL Final     Comment:     CONSISTENT WITH PREVIOUS RESULT   RESULT CHECKED       MCH   Date Value Ref Range Status   11/22/2022 35.4 (H) 26 - 34 PG Final     MCHC   Date Value Ref Range Status   11/22/2022 31.6 31 - 37 % Final     Platelets   Date Value Ref Range Status   11/22/2022 153 150 - 450 K/UL Final     MPV   Date Value Ref Range Status   11/22/2022 9.4 7.0 - 12.6 CU Final     Sodium   Date Value Ref Range Status   03/04/2024 139 133 - 145 mmol/L Final     Potassium   Date Value Ref Range Status   03/04/2024 4.7 3.4 - 5.1 mmol/L Final     Bicarbonate   Date Value Ref Range Status   03/04/2024 28 24 - 31 mmol/L Final     Urea Nitrogen   Date Value Ref Range  "Status   03/04/2024 23 8 - 25 mg/dL Final     Calcium   Date Value Ref Range Status   03/04/2024 9.1 8.5 - 10.4 mg/dL Final     No results found for: \"PROTIME\", \"PTT\", \"INR\", \"FIBRINOGEN\"      REVIEW OF RADIOLOGY   I have reviewed the following:  Radiology Studies           MRI L-spine 7/3/24:         ASSESSMENT & PLAN  Todd Small is a 59 y.o. old male with PMH CHF 2/2 former EtOH use, former smoker, CKD, MDD, papillary thyroid ca s/p thyroidectomy/RT with hypothyroidism who presents  for F/U    1) Lumbar spinal stenosis  -LBP radiating down BLE with standing/walking affecting gait, function, ADLs, and QoL  -Refractive to Tylenol, biofreeze, SNRI, Norco  -NSAIDs contraindicated 2/2 CKD. MDP may worsen rash  -Unable to meaningfully participate in PT 2/2 pain severity and late-stage CHF  -Reviewed/discussed MRI L-spine 7/3/24:  multilevel spondylosis featuring moderate L3-4 stenosis  -BL L3-4 TFESI 7/30/24: 50% relief in his legs, but 30% relief in LB    2) Sacroiliitis  -BL SIJ-mediated pain reproduced on multiple SIJ-provocative maneuvers  -Schedule BL SIJ CSI to target pain generator as seen on PE and minimize risk/likelihood of chronic opioid use and/or surgery  -Consider vertebrogenic etiology    4) Long-term opiate use  -2/2 chronic LB and leg pain refractive to conservative, interventional tx  -On Percocet 7.5 mg TID PRN with mild relief. Denies SE. Requests dose adjustment  - reviewed/appropriate. Reviewed DS 7/1/24: OK. No sign of aberrant behavior  -Educated on/prescribed Narcan 2/2 benzo co-prescription  -Inc Percocet to 7.5 mg QID PRN (45 MME) x30 d               Nathaly Cantrell MD  Anesthesiologist & Interventional Pain Physician   [facility] [place]  O: [phone]  F: [phone]  8:02 AM  08/12/24    "

## 2024-08-12 NOTE — PROGRESS NOTES
MEDICATION NAME: Percocet   STRENGTH: 7.5/325  LAST FILL DATE: 7.15.24  DATE LAST TAKEN: 24  QUANTITY FILLED: 90  QUANTITY REMAININ  COUNT COMPLETED BY: TAY JACKSON RN and CARLOS Giron      UDS LAST COMPLETED:   CONTROLLED SUBSTANCES AGREEMENT LAST SIGNED:   ORT LAST COMPLETED:  Modified Oswestry disability form filled out annually.

## 2024-08-13 ENCOUNTER — PHARMACY VISIT (OUTPATIENT)
Dept: PHARMACY | Facility: CLINIC | Age: 59
End: 2024-08-13
Payer: MEDICAID

## 2024-08-13 PROCEDURE — RXMED WILLOW AMBULATORY MEDICATION CHARGE

## 2024-08-20 PROCEDURE — RXMED WILLOW AMBULATORY MEDICATION CHARGE

## 2024-08-21 PROCEDURE — RXMED WILLOW AMBULATORY MEDICATION CHARGE

## 2024-08-22 DIAGNOSIS — I10 BENIGN ESSENTIAL HYPERTENSION: Primary | ICD-10-CM

## 2024-08-23 PROCEDURE — RXMED WILLOW AMBULATORY MEDICATION CHARGE

## 2024-08-23 RX ORDER — POTASSIUM CHLORIDE 750 MG/1
10 TABLET, FILM COATED, EXTENDED RELEASE ORAL 2 TIMES DAILY
Qty: 180 TABLET | Refills: 3 | Status: SHIPPED | OUTPATIENT
Start: 2024-08-23 | End: 2025-08-23

## 2024-08-24 PROCEDURE — RXMED WILLOW AMBULATORY MEDICATION CHARGE

## 2024-08-26 RX ORDER — POTASSIUM CHLORIDE 750 MG/1
TABLET, FILM COATED, EXTENDED RELEASE ORAL
Qty: 180 TABLET | Refills: 3 | Status: CANCELLED | OUTPATIENT
Start: 2024-08-26 | End: 2025-08-26

## 2024-08-28 ENCOUNTER — PHARMACY VISIT (OUTPATIENT)
Dept: PHARMACY | Facility: CLINIC | Age: 59
End: 2024-08-28
Payer: MEDICAID

## 2024-09-03 ENCOUNTER — APPOINTMENT (OUTPATIENT)
Dept: RADIOLOGY | Facility: EXTERNAL LOCATION | Age: 59
End: 2024-09-03
Payer: COMMERCIAL

## 2024-09-03 DIAGNOSIS — M46.1 SACROILIITIS, NOT ELSEWHERE CLASSIFIED (CMS-HCC): ICD-10-CM

## 2024-09-03 PROCEDURE — 27096 INJECT SACROILIAC JOINT: CPT | Performed by: ANESTHESIOLOGY

## 2024-09-03 NOTE — PROGRESS NOTES
OPERATIVE NOTE  Preprocedure diagnosis: Sacroiliitis  Postprocedure diagnosis Sacroiliitis    Procedure performed: BL SIJ CSI  Physician: Nathaly Cantrell MD  Anesthesia: Local  Complications: none  Blood loss:  Nil    Clinical note: This is a very pleasant 59 y.o. male with PMH CHF 2/2 former EtOH use, former smoker, CKD, MDD, papillary thyroid ca s/p thyroidectomy/RT with hypothyroidism who suffers with LBP here meeting all medical criteria for above-mentioned procedure. Patient's pain stable and persistent from last visit.  No personal/family hx issues with anesthesia. Denies allergies to Latex, steroids, local anesthetics, or iodine/contrast. Denies being on blood thinners. Not diabetic.  Denies fever, chills, NS, CP, SOB, cough, N/V.    Discussed procedure risks/benefits in detail with patient. Pt meets medical necessity for procedure due to failure of conservative measures. Reviewed procedural risks including bleeding, infection, nerve damage, paralysis. Also reviewed mitigating factors such as screening for infection/blood thinner use, sterile precautions, and image-guidance when applicable. All questions answered. Pt/guardian expressed understanding and choose to proceed      Procedure:    PROCEDURE: BILATERAL Sacroiliac joint injection(s)   [place] [facility]  Informed consent obtained  Time Out Performed: Yes   Proceduralist: Nathaly Cantrell MD   Correct procedure: Confirmed    Correct side/site: Confirmed    Correct patient position: Confirmed    Correct side/site marking visible: Confirmed    Correct X-Rays available:Confirmed    Equipment available: Confirmed    Anesthesia: Local    Diagnosis:  sacroiliitis    Patient Position: prone   Sterile prep with: Chloroprep and draped in the standard fashion   Approach: to the articularjoint from the inferior posterior  margin   Needle(s): #22 G Quincke, Length: 3.5 inches   Image Guidance: Fluoroscopy     Approach: AP contralateral oblique view.  LEFT  "posterior sacroiliac joint identified.  After local anesthetic with 1% lidocaine using 25 G 1.5\" needle, 22 G spinal needle advanced coaxially into mid joint space.  Needle position confirmed in lateral view to be just anterior to sacral plate.  Anesthetic soln injected.  Pt tolerated without issue.     Procedure was repeated on contralateral side in similar fashion without issue.    Images saved to PACS   Injectate: 40 mg methylprednisolone + 1.5 ml 0.5% bupivacaine per side injected     See nursing records for VS:; Pulse oximetry, NIBP monitoring for entire procedure    DISCHARGE SUMMARY:   Complications: None   Condition on Discharge: Stable and unchanged from admission   Disposition/Discharge: Home       Nathaly Cantrell MD  Anesthesiologist & Interventional Pain Physician   [facility] [place]  O: [phone]  F: [phone]  8:05 AM  09/03/24          "

## 2024-09-04 ENCOUNTER — APPOINTMENT (OUTPATIENT)
Dept: PRIMARY CARE | Facility: CLINIC | Age: 59
End: 2024-09-04
Payer: COMMERCIAL

## 2024-09-04 ENCOUNTER — TELEPHONE (OUTPATIENT)
Dept: PAIN MEDICINE | Facility: CLINIC | Age: 59
End: 2024-09-04
Payer: COMMERCIAL

## 2024-09-04 NOTE — TELEPHONE ENCOUNTER
"RN called pt. Back to let him know that a message was sent to Dr. Cantrell, and that another physician in the office was made aware of the situation and stated that the injection would not be the cause of hiccups. Pt. Stated that this nurse and the one he communicated with earlier \"do not know what they are doing. I am calling 911.\" Pt. Then hung up on this RN.  "

## 2024-09-04 NOTE — TELEPHONE ENCOUNTER
"Pt. Called stating that he is having hiccups since receiving his injection on 9/3/24. Pt. Stated that he has reach out the office earlier today and was told that hiccups is not something treated by this office. RN stated that she would reach out to Dr. Cantrell about this situation, and would see if Dr. Cantrell is in the office. Pt. Stated that \"I am going to be calling 911 to go to the hospital for the hiccups, and Dr. Cantrell is not going to be happy if I end up in the hospital for hiccups.\" Pt. Stated that the RN better call him back within 10 minutes and hung up the phone on RN.  Pt. Would like a prescription called to the pharmacy to help with his hiccups. Please advise.  "

## 2024-09-04 NOTE — TELEPHONE ENCOUNTER
Pt. Called again requesting prescription for his hiccups he has had since yesterday after his procedure. Second call today.

## 2024-09-05 ENCOUNTER — TELEPHONE (OUTPATIENT)
Dept: PRIMARY CARE | Facility: CLINIC | Age: 59
End: 2024-09-05
Payer: COMMERCIAL

## 2024-09-05 ENCOUNTER — PHARMACY VISIT (OUTPATIENT)
Dept: PHARMACY | Facility: CLINIC | Age: 59
End: 2024-09-05
Payer: MEDICAID

## 2024-09-05 DIAGNOSIS — E03.9 ACQUIRED HYPOTHYROIDISM: ICD-10-CM

## 2024-09-05 DIAGNOSIS — R06.6 INTRACTABLE HICCUPS: Primary | ICD-10-CM

## 2024-09-05 PROCEDURE — RXMED WILLOW AMBULATORY MEDICATION CHARGE

## 2024-09-05 RX ORDER — LEVOTHYROXINE SODIUM 150 UG/1
150 TABLET ORAL SEE ADMIN INSTRUCTIONS
Qty: 96 TABLET | Refills: 1 | Status: SHIPPED | OUTPATIENT
Start: 2024-09-05

## 2024-09-05 RX ORDER — BACLOFEN 5 MG/1
5 TABLET ORAL 3 TIMES DAILY PRN
Qty: 30 TABLET | Refills: 0 | Status: SHIPPED | OUTPATIENT
Start: 2024-09-05

## 2024-09-05 NOTE — TELEPHONE ENCOUNTER
Patient is stating he is having non stop hikkups since his injection in his back done on Tuesday morning. He is stating he is having a Cortosteroid reaction. He is asking if he can be prescribed the medication chlorpromazine for these hikkups. He is asking for advice on this issue. Please call to advise# [phone].

## 2024-09-05 NOTE — TELEPHONE ENCOUNTER
"RN called patient to follow up about his hiccups. Patient states that he was \"blown off\" and had someone else take care of him. I told him that I am glad he was able to get his hiccups treated as we are a pain management office and do not treat hiccups and that is why we recommended him to call his PCP. He stated that he had a reaction to the corticosteroid but he isn't going to go into this because he is trying to have a good day today. I wished him a good day and he hung up.   "

## 2024-09-05 NOTE — TELEPHONE ENCOUNTER
Sorry to hear about your recent symptoms.  I have a little bit more experience with using muscle relaxant baclofen as needed to help break hiccup cycle.  Will send prescription to pharmacy with plans to keep us in the loop on how you are doing.  Please keep in mind that muscle relaxants often can make people mildly sleepy/groggy when using.  Will be using lowest dose though we do caution driving or operating machinery when taking the medication.  Feel free to keep me in the loop on how you are doing

## 2024-09-11 ENCOUNTER — OFFICE VISIT (OUTPATIENT)
Dept: PAIN MEDICINE | Facility: CLINIC | Age: 59
End: 2024-09-11
Payer: COMMERCIAL

## 2024-09-11 VITALS
WEIGHT: 228 LBS | RESPIRATION RATE: 20 BRPM | HEART RATE: 105 BPM | SYSTOLIC BLOOD PRESSURE: 146 MMHG | DIASTOLIC BLOOD PRESSURE: 90 MMHG | BODY MASS INDEX: 31.92 KG/M2 | HEIGHT: 71 IN

## 2024-09-11 DIAGNOSIS — Z79.891 LONG TERM CURRENT USE OF OPIATE ANALGESIC: ICD-10-CM

## 2024-09-11 DIAGNOSIS — M48.061 SPINAL STENOSIS OF LUMBAR REGION WITHOUT NEUROGENIC CLAUDICATION: ICD-10-CM

## 2024-09-11 DIAGNOSIS — M54.16 LUMBAR RADICULOPATHY: ICD-10-CM

## 2024-09-11 DIAGNOSIS — M46.1 SACROILIITIS (CMS-HCC): Primary | ICD-10-CM

## 2024-09-11 PROCEDURE — 3077F SYST BP >= 140 MM HG: CPT | Performed by: ANESTHESIOLOGY

## 2024-09-11 PROCEDURE — 99215 OFFICE O/P EST HI 40 MIN: CPT | Performed by: ANESTHESIOLOGY

## 2024-09-11 PROCEDURE — 3080F DIAST BP >= 90 MM HG: CPT | Performed by: ANESTHESIOLOGY

## 2024-09-11 PROCEDURE — 3008F BODY MASS INDEX DOCD: CPT | Performed by: ANESTHESIOLOGY

## 2024-09-11 PROCEDURE — 1036F TOBACCO NON-USER: CPT | Performed by: ANESTHESIOLOGY

## 2024-09-11 RX ORDER — OXYCODONE AND ACETAMINOPHEN 7.5; 325 MG/1; MG/1
1 TABLET ORAL 4 TIMES DAILY PRN
Qty: 120 TABLET | Refills: 0 | Status: SHIPPED | OUTPATIENT
Start: 2024-09-13 | End: 2024-10-13

## 2024-09-11 ASSESSMENT — PAIN SCALES - GENERAL
PAINLEVEL_OUTOF10: 9
PAINLEVEL: 9

## 2024-09-11 ASSESSMENT — ENCOUNTER SYMPTOMS
PSYCHIATRIC NEGATIVE: 1
GASTROINTESTINAL NEGATIVE: 1
CONSTITUTIONAL NEGATIVE: 1
EYES NEGATIVE: 1
NUMBNESS: 1
RESPIRATORY NEGATIVE: 1
ENDOCRINE NEGATIVE: 1
WEAKNESS: 1
CARDIOVASCULAR NEGATIVE: 1
BACK PAIN: 1
HEMATOLOGIC/LYMPHATIC NEGATIVE: 1

## 2024-09-11 ASSESSMENT — PAIN - FUNCTIONAL ASSESSMENT
PAIN_FUNCTIONAL_ASSESSMENT: 0-10
PAIN_FUNCTIONAL_ASSESSMENT: 0-10

## 2024-09-11 ASSESSMENT — PATIENT HEALTH QUESTIONNAIRE - PHQ9
2. FEELING DOWN, DEPRESSED OR HOPELESS: NOT AT ALL
SUM OF ALL RESPONSES TO PHQ9 QUESTIONS 1 AND 2: 0
1. LITTLE INTEREST OR PLEASURE IN DOING THINGS: NOT AT ALL

## 2024-09-11 ASSESSMENT — PAIN DESCRIPTION - DESCRIPTORS: DESCRIPTORS: ACHING;NUMBNESS

## 2024-09-11 NOTE — PROGRESS NOTES
PAIN MANAGEMENT FOLLOW-UP OFFICE NOTE    Date of Service: 9/11/2024    SUBJECTIVE    CHIEF COMPLAINT: LBP    HISTORY OF PRESENT ILLNESS    Todd Small is a 59 y.o. male with PMH CHF 2/2 former EtOH use, former smoker, CKD, MDD, papillary thyroid ca s/p thyroidectomy/RT with hypothyroidism who presents for F/U    On 9/3, pt underwent BL SIJ CSI with 100% relief for 3 hours before pain returned to baseline. Pt notes he had hiccups for 2 days after procedure. Taking Percocet 7.5 mg QID PRN with mild relief. Denies SE.    Pt denies new-onset bowel/bladder incontinence.  Pt denies recent infection, allergy to Latex/iodine/contrast. Patient is currently taking the following blood thinner(s): N/A    Procedure log:  -BL SIJ  CSI 9/3/24: 100% relief 3 hours before return to baseline   -BL L3-4 TFESI 7/30/24: 50% relief in his legs, but 30% relief in LB      REVIEW OF SYSTEMS  Review of Systems   Constitutional: Negative.    HENT: Negative.     Eyes: Negative.    Respiratory: Negative.     Cardiovascular: Negative.    Gastrointestinal: Negative.    Endocrine: Negative.    Musculoskeletal:  Positive for back pain.   Skin: Negative.    Neurological:  Positive for weakness and numbness.   Hematological: Negative.    Psychiatric/Behavioral: Negative.         PAST MEDICAL HISTORY  Past Medical History:   Diagnosis Date    Anxiety 4/01/23    Cancer (Multi) 10/01/0-    CHF (congestive heart failure) (Multi) 10/01/23    Depression 10/01/23    Extremity pain     Headache     Hypothyroidism     Low back pain     Thyroid cancer (Multi)      Past Surgical History:   Procedure Laterality Date    APPENDECTOMY  10/01/96    CARDIAC CATHETERIZATION      COLONOSCOPY  09/11/2020    HERNIA REPAIR      THYROIDECTOMY      VASECTOMY       Family History   Problem Relation Name Age of Onset    Heart disease Mother Kim     Heart disease Sister (1)     Other (pacemaker) Sister (1)     Heart disease Brother Skye     Atrial fibrillation Brother  Gilberto        CURRENT MEDICATIONS  Current Outpatient Medications   Medication Sig Dispense Refill    ALPRAZolam (Xanax) 0.5 mg tablet Take 1-2 tablets by mouth in the morning, 1-2 tablets at midday, and 4 tablets at bedtime as needed 240 tablet 1    ALPRAZolam (Xanax) 0.5 mg tablet Take 1-2  tablets by mouth in the morning, 1-2 tablets at midday, and 4 tablets at bedtime as needed 240 tablet 1    ALPRAZolam (Xanax) 0.5 mg tablet Take 1-2 tablets by mouth in the morning, 1-2 tablets at midday, and 4 tablets at bedtime as needed 240 tablet 1    amLODIPine (Norvasc) 5 mg tablet Take 1 tablet (5 mg) by mouth once daily. 90 tablet 3    carvedilol (Coreg) 12.5 mg tablet TAKE 1 TABLET BY MOUTH TWO TIMES A DAY WITH FOOD 180 tablet 3    diclofenac sodium (Voltaren) 1 % gel Apply 1 Application topically 4 times a day as needed (as needed for pain). 100 g 3    furosemide (Lasix) 40 mg tablet TAKE ONE TABLET BY MOUTH ONCE A DAY 90 tablet 3    levothyroxine (Synthroid) 150 mcg tablet Take 1 tablet (150 mcg) by mouth on MONDAY through SATURDAY, and take 1 AND 1/2 tablets on SUNDAY as directed 96 tablet 1    naloxone (Narcan) 4 mg/0.1 mL nasal spray Administer 1 spray (4 mg) into affected nostril(s) if needed for opioid reversal. May repeat every 2-3 minutes if needed, alternating nostrils, until medical assistance becomes available. 2 each 0    oxyCODONE-acetaminophen (Percocet) 7.5-325 mg tablet Take 1 tablet by mouth 4 times a day as needed for severe pain (7 - 10). Do not fill before August 13, 2024. 120 tablet 0    potassium chloride CR (Klor-Con) 10 mEq ER tablet Take 1 tablet (10 mEq) by mouth 2 times a day. 180 tablet 3    sacubitriL-valsartan (Entresto)  mg tablet Take 1 tablet by mouth 2 times a day. 180 tablet 3    venlafaxine XR (Effexor-XR) 150 mg 24 hr capsule Take 1 capsule by mouth once a day (take with 37.5mg capsule for a total of 187.5mg daily) 30 capsule 1    venlafaxine XR (Effexor-XR) 150 mg 24 hr  "capsule Take 1 capsule by mouth  once a day (take with 37.5mg capsule for a total of 187.5mg daily) 30 capsule 1    venlafaxine XR (Effexor-XR) 150 mg 24 hr capsule Take 1 tablet by mouth once a day (take with 75mg capsule for a total of 225mg daily) 30 capsule 1    venlafaxine XR (Effexor-XR) 75 mg 24 hr capsule Take one capsule by mouth once a day (take with 150mg capsule for a total of 225mg daily) 30 capsule 1    baclofen (Lioresal) 5 mg tablet Take 1 tablet (5 mg) by mouth 3 times a day as needed (hiccups). (Patient not taking: Reported on 9/11/2024) 30 tablet 0     No current facility-administered medications for this visit.       ALLERGIES AND DRUG REACTIONS  Allergies   Allergen Reactions    Latex Rash    Sulfa (Sulfonamide Antibiotics) Rash and Unknown          OBJECTIVE  Visit Vitals  /90   Pulse 105   Resp 20   Ht 1.803 m (5' 11\")   Wt 103 kg (228 lb)   BMI 31.80 kg/m²   Smoking Status Former   BSA 2.27 m²       Last Recorded Pain Score (if available):                Physical Exam  General: Sitting in chair, NAD, antalgic gait with cane and hunched forward  Head: NCAT  Eyes: Sclera/conjunctiva clear, EOMI, PERRL  Nose/mouth: MMM  CV: Good distal pulses  Lungs: Good/equal chest excursion  Abdomen: Soft, ND  Ext: No cyanosis/edema  MSK: L-spine alignment: unremarkable, BL paraspinal m/BL PSIS TTP, L-spine ROM: ext limited by pain  +Jaspreet BL, JEANINE BL, thigh thrust BL, [place] BL    Neuro: AAOx3, CN grossly nl  Dermatome sensation to light touch  LEFT L1 (lower pelvis/upper thigh): WNL    RIGHT L1: WNL      LEFT L2 (upper thigh): WNL       RIGHT: L2:WNL      LEFT L3 (medial knee): WNL       RIGHT L3: WNL      LEFT L4 (superior medial malleolus): WNL       RIGHT L4: WNL      LEFT L5 (dorsal foot): WNL       RIGHT L5: WNL      LEFT S1 (lateral foot): WNL     RIGHT S1: WNL      LEFT S2 (popliteal fossa): WNL    RIGHT S2: WNL        Motor strength (poor effort, but coachable)  LEFT L2 (hip flexion): 5/5  " " RIGHT L2: 5/5  LEFT L3 (knee extension): 5/5     RIGHT L3: 5/5  LEFT L4 (dorsiflexion): 5/5     RIGHT L4: 5/5  LEFT L5 (EHL extension): 5/5     RIGHT L5: 5/5  LEFT S1 (plantarflexion): 5/5     RIGHT S1: 5/5  LEFT S2 (knee flexion): 5/5      RIGHT S2: 5/5    Special testing  DTR diminished patellar reflexes  Seated slump test neg BL    Psych: affect nl, affect nl  Skin: no rash      REVIEW OF LABORATORY DATA  I have reviewed the following lab results:  WBC   Date Value Ref Range Status   11/22/2022 4.2 (L) 4.5 - 11.0 K/UL Final     RBC   Date Value Ref Range Status   11/22/2022 3.53 (L) 4.5 - 5.5 M/UL Final     Hemoglobin   Date Value Ref Range Status   11/22/2022 12.5 (L) 13.5 - 16.5 GM/DL Final     Hematocrit   Date Value Ref Range Status   11/22/2022 39.6 (L) 41 - 50 % Final     MCV   Date Value Ref Range Status   11/22/2022 112.2 (H) 80 - 100 FL Final     Comment:     CONSISTENT WITH PREVIOUS RESULT   RESULT CHECKED       MCH   Date Value Ref Range Status   11/22/2022 35.4 (H) 26 - 34 PG Final     MCHC   Date Value Ref Range Status   11/22/2022 31.6 31 - 37 % Final     Platelets   Date Value Ref Range Status   11/22/2022 153 150 - 450 K/UL Final     MPV   Date Value Ref Range Status   11/22/2022 9.4 7.0 - 12.6 CU Final     Sodium   Date Value Ref Range Status   03/04/2024 139 133 - 145 mmol/L Final     Potassium   Date Value Ref Range Status   03/04/2024 4.7 3.4 - 5.1 mmol/L Final     Bicarbonate   Date Value Ref Range Status   03/04/2024 28 24 - 31 mmol/L Final     Urea Nitrogen   Date Value Ref Range Status   03/04/2024 23 8 - 25 mg/dL Final     Calcium   Date Value Ref Range Status   03/04/2024 9.1 8.5 - 10.4 mg/dL Final     No results found for: \"PROTIME\", \"PTT\", \"INR\", \"FIBRINOGEN\"      REVIEW OF RADIOLOGY   I have reviewed the following:  Radiology Studies           MRI L-spine 7/3/24:         ASSESSMENT & PLAN  Todd Small is a 59 y.o. old male with PMH CHF 2/2 former EtOH use, former smoker, CKD, MDD, " papillary thyroid ca s/p thyroidectomy/RT with hypothyroidism who presents  for F/U    1) Lumbar spinal stenosis  -LBP radiating down BLE with standing/walking affecting gait, function, ADLs, and QoL  -Refractive to Tylenol, biofreeze, SNRI, Norco  -NSAIDs contraindicated 2/2 CKD. MDP may worsen rash  -Unable to meaningfully participate in PT 2/2 pain severity and late-stage CHF  -MRI L-spine 7/3/24:  multilevel spondylosis featuring moderate L3-4 stenosis  -BL L3-4 TFESI 7/30/24: 50% relief in his legs, but 30% relief in LB    2) Sacroiliitis  -BL SIJ-mediated pain reproduced on multiple SIJ-provocative maneuvers  -Refractive to yrs of conservative tx including Tylenol, biofreeze, Norco  -SIJ fusion work-up  --BL SIJ CSI 9/3/24: 100% relief 3 h  --Schedule diagnostic/therapeutic BL SIJ injection with local anesthetic only to assess candidacy for fusion  --CT pelvis    4) Long-term opiate use  -2/2 chronic LB and leg pain refractive to conservative, interventional tx  -On Percocet 7.5 mg QID PRN (45 MME) with moderate relief. Denies SE. Requests dose adjustment  - reviewed/appropriate. Reviewed DS 7/1/24: OK. No sign of aberrant behavior. Consider UDS next visit  -Educated on/prescribed Narcan 2/2 benzo co-prescription  -RF Percocet x30 d  -F/U 1 mo        Time spent- preparation: 5 min; obtaining history: 24 min; PE: 5 min; Counseling/education: 6 min; communicating with other providers: 0 min; documentation: 8 min; independent result interpretation: 0 min; care coordination: 3 min; TOTAL ENCOUNTER TIME: 51 min                 Nathaly Cantrell MD  Anesthesiologist & Interventional Pain Physician   [facility]  O: [phone]  F: [phone]  9:15 AM  09/11/24

## 2024-09-11 NOTE — PROGRESS NOTES
MEDICATION NAME: Percocet   STRENGTH: 7.5/325mg   LAST FILL DATE: 24  DATE LAST TAKEN: 24  QUANTITY FILLED: 120  QUANTITY REMAININ  COUNT COMPLETED BY: TAY MORRIS RN  and CARLOS OCASIO      UDS LAST COMPLETED:   CONTROLLED SUBSTANCES AGREEMENT LAST SIGNED:   ORT LAST COMPLETED:  Modified Oswestry disability form filled out annually.

## 2024-09-13 ENCOUNTER — PHARMACY VISIT (OUTPATIENT)
Dept: PHARMACY | Facility: CLINIC | Age: 59
End: 2024-09-13
Payer: MEDICAID

## 2024-09-13 PROCEDURE — RXMED WILLOW AMBULATORY MEDICATION CHARGE

## 2024-09-13 PROCEDURE — RXOTC WILLOW AMBULATORY OTC CHARGE

## 2024-09-17 ENCOUNTER — PHARMACY VISIT (OUTPATIENT)
Dept: PHARMACY | Facility: CLINIC | Age: 59
End: 2024-09-17

## 2024-09-19 ENCOUNTER — OFFICE VISIT (OUTPATIENT)
Dept: PRIMARY CARE | Facility: CLINIC | Age: 59
End: 2024-09-19
Payer: COMMERCIAL

## 2024-09-19 VITALS
WEIGHT: 232 LBS | HEART RATE: 86 BPM | BODY MASS INDEX: 32.48 KG/M2 | DIASTOLIC BLOOD PRESSURE: 88 MMHG | SYSTOLIC BLOOD PRESSURE: 130 MMHG | HEIGHT: 71 IN | OXYGEN SATURATION: 99 % | TEMPERATURE: 98.7 F

## 2024-09-19 DIAGNOSIS — N52.9 ERECTILE DYSFUNCTION, UNSPECIFIED ERECTILE DYSFUNCTION TYPE: ICD-10-CM

## 2024-09-19 DIAGNOSIS — Z23 ENCOUNTER FOR IMMUNIZATION: ICD-10-CM

## 2024-09-19 DIAGNOSIS — I10 BENIGN ESSENTIAL HYPERTENSION: ICD-10-CM

## 2024-09-19 DIAGNOSIS — Z12.5 PROSTATE CANCER SCREENING: ICD-10-CM

## 2024-09-19 DIAGNOSIS — Z11.59 NEED FOR HEPATITIS C SCREENING TEST: ICD-10-CM

## 2024-09-19 DIAGNOSIS — E89.0 POSTOPERATIVE HYPOTHYROIDISM: ICD-10-CM

## 2024-09-19 DIAGNOSIS — E03.9 ACQUIRED HYPOTHYROIDISM: ICD-10-CM

## 2024-09-19 DIAGNOSIS — B35.6 TINEA CRURIS: Primary | ICD-10-CM

## 2024-09-19 PROBLEM — F10.929 ALCOHOL INTOXICATION (CMS-HCC): Status: RESOLVED | Noted: 2023-09-04 | Resolved: 2024-09-19

## 2024-09-19 PROBLEM — R30.0 DYSURIA: Status: RESOLVED | Noted: 2024-02-06 | Resolved: 2024-09-19

## 2024-09-19 PROBLEM — L03.119 CELLULITIS OF LOWER LEG: Status: RESOLVED | Noted: 2023-09-04 | Resolved: 2024-09-19

## 2024-09-19 PROBLEM — L28.2 PRURITIC RASH: Status: RESOLVED | Noted: 2024-03-04 | Resolved: 2024-09-19

## 2024-09-19 PROBLEM — R10.9 ABDOMINAL PAIN: Status: RESOLVED | Noted: 2023-09-04 | Resolved: 2024-09-19

## 2024-09-19 PROBLEM — M46.1 SACROILIITIS (CMS-HCC): Status: RESOLVED | Noted: 2024-08-12 | Resolved: 2024-09-19

## 2024-09-19 PROBLEM — K40.90 RIGHT INGUINAL HERNIA: Status: RESOLVED | Noted: 2023-09-04 | Resolved: 2024-09-19

## 2024-09-19 PROBLEM — M25.551 RIGHT HIP PAIN: Status: RESOLVED | Noted: 2023-09-04 | Resolved: 2024-09-19

## 2024-09-19 PROBLEM — K52.9 COLITIS: Status: RESOLVED | Noted: 2023-09-04 | Resolved: 2024-09-19

## 2024-09-19 PROBLEM — L03.90 CELLULITIS: Status: RESOLVED | Noted: 2023-09-04 | Resolved: 2024-09-19

## 2024-09-19 PROBLEM — M25.559 ARTHRALGIA OF HIP: Status: RESOLVED | Noted: 2024-07-24 | Resolved: 2024-09-19

## 2024-09-19 PROBLEM — F10.929 ALCOHOLIC INTOXICATION (CMS-HCC): Status: RESOLVED | Noted: 2024-07-24 | Resolved: 2024-09-19

## 2024-09-19 PROBLEM — F10.90 ALCOHOL USE, UNSPECIFIED, UNCOMPLICATED: Status: RESOLVED | Noted: 2022-11-17 | Resolved: 2024-09-19

## 2024-09-19 PROBLEM — J06.9 VIRAL UPPER RESPIRATORY TRACT INFECTION: Status: RESOLVED | Noted: 2024-07-24 | Resolved: 2024-09-19

## 2024-09-19 PROBLEM — R51.9 HEADACHE: Status: RESOLVED | Noted: 2023-09-04 | Resolved: 2024-09-19

## 2024-09-19 PROBLEM — J32.9 SINUSITIS: Status: RESOLVED | Noted: 2024-07-24 | Resolved: 2024-09-19

## 2024-09-19 PROBLEM — R40.1 CLOUDED CONSCIOUSNESS: Status: RESOLVED | Noted: 2023-09-04 | Resolved: 2024-09-19

## 2024-09-19 PROBLEM — L73.9 FOLLICULITIS: Status: RESOLVED | Noted: 2023-09-04 | Resolved: 2024-09-19

## 2024-09-19 PROCEDURE — 90471 IMMUNIZATION ADMIN: CPT | Performed by: FAMILY MEDICINE

## 2024-09-19 PROCEDURE — 99214 OFFICE O/P EST MOD 30 MIN: CPT | Performed by: FAMILY MEDICINE

## 2024-09-19 RX ORDER — SILDENAFIL CITRATE 20 MG/1
20 TABLET ORAL SEE ADMIN INSTRUCTIONS
Qty: 12 TABLET | Refills: 2 | Status: SHIPPED | OUTPATIENT
Start: 2024-09-19 | End: 2025-09-19

## 2024-09-19 ASSESSMENT — PATIENT HEALTH QUESTIONNAIRE - PHQ9
8. MOVING OR SPEAKING SO SLOWLY THAT OTHER PEOPLE COULD HAVE NOTICED. OR THE OPPOSITE, BEING SO FIGETY OR RESTLESS THAT YOU HAVE BEEN MOVING AROUND A LOT MORE THAN USUAL: NOT AT ALL
4. FEELING TIRED OR HAVING LITTLE ENERGY: NOT AT ALL
9. THOUGHTS THAT YOU WOULD BE BETTER OFF DEAD, OR OF HURTING YOURSELF: NOT AT ALL
7. TROUBLE CONCENTRATING ON THINGS, SUCH AS READING THE NEWSPAPER OR WATCHING TELEVISION: NOT AT ALL
5. POOR APPETITE OR OVEREATING: NOT AT ALL
6. FEELING BAD ABOUT YOURSELF - OR THAT YOU ARE A FAILURE OR HAVE LET YOURSELF OR YOUR FAMILY DOWN: NOT AT ALL
2. FEELING DOWN, DEPRESSED OR HOPELESS: NEARLY EVERY DAY
3. TROUBLE FALLING OR STAYING ASLEEP OR SLEEPING TOO MUCH: SEVERAL DAYS
SUM OF ALL RESPONSES TO PHQ QUESTIONS 1-9: 5
1. LITTLE INTEREST OR PLEASURE IN DOING THINGS: SEVERAL DAYS
10. IF YOU CHECKED OFF ANY PROBLEMS, HOW DIFFICULT HAVE THESE PROBLEMS MADE IT FOR YOU TO DO YOUR WORK, TAKE CARE OF THINGS AT HOME, OR GET ALONG WITH OTHER PEOPLE: NOT DIFFICULT AT ALL
SUM OF ALL RESPONSES TO PHQ9 QUESTIONS 1 AND 2: 4

## 2024-09-19 ASSESSMENT — PAIN SCALES - GENERAL: PAINLEVEL: 5

## 2024-09-19 NOTE — ASSESSMENT & PLAN NOTE
>>ASSESSMENT AND PLAN FOR BENIGN ESSENTIAL HYPERTENSION WRITTEN ON 9/19/2024  7:19 AM BY SANTOS POWELL DO    - Blood pressure stable in office today

## 2024-09-19 NOTE — PROGRESS NOTES
outpatient Visit Note    Chief Complaint   Patient presents with    Follow-up     Fungus f/u         HPI:  Todd Small is a 59 y.o. male with a complex past medical history significant for CHF, nonischemic cardiomyopathy, hypertension, alcohol abuse, anxiety and postprocedural hypothyroidism who presents to the office for follow up. He was last seen in the office in March secondary to complaints of pruritic rash.    In review, patient was seen in February to which he reported persistent and progressive urinary discomfort with focal irritation at penile meatus for approximately 2 weeks.  Denied any discharge.  States that skin around meatus has been erythematous intermittently.  Notes urinary frequency/urgency.  Denies any urinary odor, hematuria or visible urine changes.  States you have been started on Farxiga in the interval.    Urinalysis was prominent for glucosuria likely exacerbated by Farxiga.  Symptoms seem to be consistent with mycotic infection to which he was placed on Diflucan regimen.  Topical antifungal was also initiated.  He did experience symptom improvement March follow-up.    Today he reports mild irritation in his inguinal creases to which he has been attempting to thoroughly cleanse.  Denies any active genital rash.  No reports of dysuria, urinary frequency, urgency or discharge.  Has had intermittent struggles with erectile dysfunction which he does express interest in trial of sildenafil.    Current Medications  Current Outpatient Medications   Medication Instructions    ALPRAZolam (Xanax) 0.5 mg tablet Take 1-2 tablets by mouth in the morning, 1-2 tablets at midday, and 4 tablets at bedtime as needed    ALPRAZolam (Xanax) 0.5 mg tablet Take 1-2 oral tablets in the morning, 1-2 tablets at midday, and 4 tablets at bedtime as needed    amLODIPine (NORVASC) 5 mg, oral, Daily    carvedilol (Coreg) 12.5 mg tablet TAKE 1 TABLET BY MOUTH TWO TIMES A DAY WITH FOOD    diclofenac sodium (Voltaren) 1  % gel Apply 1 Application topically 4 times a day as needed (as needed for pain).    furosemide (Lasix) 40 mg tablet TAKE ONE TABLET BY MOUTH ONCE A DAY    levothyroxine (Synthroid) 150 mcg tablet Take 1 tablet (150 mcg) by mouth on MONDAY through SATURDAY, and take 1 AND 1/2 tablets on SUNDAY as directed    naloxone (NARCAN) 4 mg, nasal, As needed, May repeat every 2-3 minutes if needed, alternating nostrils, until medical assistance becomes available.    oxyCODONE-acetaminophen (Percocet) 7.5-325 mg tablet Take 1 tablet by mouth 4 times a day as needed for severe pain (7 - 10). Do not fill before August 13, 2024.    potassium chloride CR (Klor-Con) 10 mEq ER tablet 10 mEq, oral, 2 times daily    sacubitriL-valsartan (Entresto)  mg tablet 1 tablet, oral, 2 times daily    sildenafil (Revatio) 20 mg tablet Take 1 tablet (20 mg) by mouth 30-45 minutes prior to sexual activity. Do not exceed 100mg in a 24 hour period    venlafaxine XR (Effexor-XR) 150 mg 24 hr capsule Take 1 tablet by mouth once a day (take with 75mg capsule for a total of 225mg daily)    venlafaxine XR (Effexor-XR) 75 mg 24 hr capsule Take one capsule by mouth once a day (take with 150mg capsule for a total of 225mg daily)        Allergies  Allergies   Allergen Reactions    Latex Rash    Sulfa (Sulfonamide Antibiotics) Rash and Unknown        Past Medical History:   Diagnosis Date    Anxiety 4/01/23    Cancer (Multi) 10/01/0-    CHF (congestive heart failure) (Multi) 10/01/23    Depression 10/01/23    Extremity pain     Headache     Hypothyroidism     Low back pain     Thyroid cancer (Multi)       Past Surgical History:   Procedure Laterality Date    APPENDECTOMY  10/01/96    CARDIAC CATHETERIZATION      COLONOSCOPY  09/11/2020    HERNIA REPAIR      THYROIDECTOMY      VASECTOMY       Family History   Problem Relation Name Age of Onset    Heart disease Mother Kim     Heart disease Sister (1)     Other (pacemaker) Sister (1)     Heart disease  Brother Skye     Atrial fibrillation Brother Gilberto      Social History     Tobacco Use    Smoking status: Former     Current packs/day: 0.00     Average packs/day: 1 pack/day for 15.0 years (15.0 ttl pk-yrs)     Types: Cigarettes     Start date: 2008     Quit date: 2023     Years since quittin.2    Smokeless tobacco: Never   Vaping Use    Vaping status: Never Used   Substance Use Topics    Alcohol use: Not Currently    Drug use: Never       ROS  All pertinent positive symptoms are included in the history of present illness.  All other systems have been reviewed and are negative and noncontributory to this patient's current ailments.    VITAL SIGNS  Vitals:    24 0853   BP: 130/88   Pulse: 86   Temp: 37.1 °C (98.7 °F)   SpO2: 99%         PHYSICAL EXAM  GENERAL APPEARANCE: alert and oriented, Pleasant and cooperative, No Acute Distress  HEENT: EOMI, PERRLA, mild external auditory canal irritation left, tympanic membranes clear and flat, nose clear, Oropharynx clear with MMM  : No visible penile rash, small noninflamed skin tags in inguinal crease with no visible rash  NEUROLOGIC EXAM: non-focal exam  MUSCULOSKELETAL: no gross abnormalities  PSYCH: affect is normal, eye contact is good    Assessment/Plan   Problem List Items Addressed This Visit             ICD-10-CM    Benign essential hypertension I10     - Blood pressure stable in office today         Relevant Orders    TSH with reflex to Free T4 if abnormal (Completed)    Lipid Panel (Completed)    Comprehensive Metabolic Panel (Completed)    CBC (Completed)    Tinea cruris - Primary B35.6     - Resolved with no appreciated active inguinal rash         Postoperative hypothyroidism E89.0     - We will check thyroid level blood work ordered today  -Continue on current regimen pending blood work results to which we will make any adjustments if warranted         Erectile dysfunction N52.9     -Will initiate trial of low-dose generic Viagra with  plans to closely monitor effectiveness and tolerance         Relevant Medications    sildenafil (Revatio) 20 mg tablet     Other Visit Diagnoses         Codes    Encounter for immunization     Z23    Relevant Orders    Flu vaccine, trivalent, preservative free, no egg protein, age 18y+ (Flublok) (Completed)    Prostate cancer screening     Z12.5    Relevant Orders    Prostate Spec.Ag,Screen    Need for hepatitis C screening test     Z11.59    Relevant Orders    Hepatitis C antibody    Acquired hypothyroidism     E03.9    Relevant Orders    TSH with reflex to Free T4 if abnormal (Completed)    Lipid Panel (Completed)    Comprehensive Metabolic Panel (Completed)

## 2024-09-20 ENCOUNTER — LAB (OUTPATIENT)
Dept: LAB | Facility: LAB | Age: 59
End: 2024-09-20
Payer: COMMERCIAL

## 2024-09-20 DIAGNOSIS — I10 BENIGN ESSENTIAL HYPERTENSION: ICD-10-CM

## 2024-09-20 DIAGNOSIS — E03.9 ACQUIRED HYPOTHYROIDISM: ICD-10-CM

## 2024-09-20 DIAGNOSIS — Z11.59 NEED FOR HEPATITIS C SCREENING TEST: ICD-10-CM

## 2024-09-20 DIAGNOSIS — Z12.5 PROSTATE CANCER SCREENING: ICD-10-CM

## 2024-09-20 PROBLEM — N52.9 ERECTILE DYSFUNCTION: Status: ACTIVE | Noted: 2024-09-20

## 2024-09-20 LAB
ALBUMIN SERPL BCP-MCNC: 4.1 G/DL (ref 3.4–5)
ALP SERPL-CCNC: 48 U/L (ref 33–120)
ALT SERPL W P-5'-P-CCNC: 14 U/L (ref 10–52)
ANION GAP SERPL CALCULATED.3IONS-SCNC: 10 MMOL/L (ref 10–20)
AST SERPL W P-5'-P-CCNC: 14 U/L (ref 9–39)
BILIRUB SERPL-MCNC: 0.3 MG/DL (ref 0–1.2)
BUN SERPL-MCNC: 17 MG/DL (ref 6–23)
CALCIUM SERPL-MCNC: 8.8 MG/DL (ref 8.6–10.3)
CHLORIDE SERPL-SCNC: 100 MMOL/L (ref 98–107)
CHOLEST SERPL-MCNC: 214 MG/DL (ref 0–199)
CHOLEST/HDLC SERPL: 4.2 {RATIO}
CO2 SERPL-SCNC: 31 MMOL/L (ref 21–32)
CREAT SERPL-MCNC: 1.28 MG/DL (ref 0.5–1.3)
EGFRCR SERPLBLD CKD-EPI 2021: 64 ML/MIN/1.73M*2
ERYTHROCYTE [DISTWIDTH] IN BLOOD BY AUTOMATED COUNT: 13.3 % (ref 11.5–14.5)
GLUCOSE SERPL-MCNC: 91 MG/DL (ref 74–99)
HCT VFR BLD AUTO: 38.9 % (ref 41–52)
HCV AB SER QL: NONREACTIVE
HDLC SERPL-MCNC: 50.5 MG/DL
HGB BLD-MCNC: 12.4 G/DL (ref 13.5–17.5)
LDLC SERPL CALC-MCNC: 146 MG/DL
MCH RBC QN AUTO: 30.5 PG (ref 26–34)
MCHC RBC AUTO-ENTMCNC: 31.9 G/DL (ref 32–36)
MCV RBC AUTO: 96 FL (ref 80–100)
NON HDL CHOLESTEROL: 164 MG/DL (ref 0–149)
NRBC BLD-RTO: 0 /100 WBCS (ref 0–0)
PLATELET # BLD AUTO: 222 X10*3/UL (ref 150–450)
POTASSIUM SERPL-SCNC: 4.9 MMOL/L (ref 3.5–5.3)
PROT SERPL-MCNC: 6.3 G/DL (ref 6.4–8.2)
PSA SERPL-MCNC: 0.62 NG/ML
RBC # BLD AUTO: 4.06 X10*6/UL (ref 4.5–5.9)
SODIUM SERPL-SCNC: 136 MMOL/L (ref 136–145)
TRIGL SERPL-MCNC: 90 MG/DL (ref 0–149)
TSH SERPL-ACNC: 1.24 MIU/L (ref 0.44–3.98)
VLDL: 18 MG/DL (ref 0–40)
WBC # BLD AUTO: 7.6 X10*3/UL (ref 4.4–11.3)

## 2024-09-20 PROCEDURE — 84443 ASSAY THYROID STIM HORMONE: CPT

## 2024-09-20 PROCEDURE — 36415 COLL VENOUS BLD VENIPUNCTURE: CPT

## 2024-09-20 PROCEDURE — 86803 HEPATITIS C AB TEST: CPT

## 2024-09-20 PROCEDURE — 80053 COMPREHEN METABOLIC PANEL: CPT

## 2024-09-20 PROCEDURE — 80061 LIPID PANEL: CPT

## 2024-09-20 PROCEDURE — 85027 COMPLETE CBC AUTOMATED: CPT

## 2024-09-20 PROCEDURE — 84153 ASSAY OF PSA TOTAL: CPT

## 2024-09-20 NOTE — PATIENT INSTRUCTIONS
Problem List Items Addressed This Visit             ICD-10-CM    Benign essential hypertension I10     - Blood pressure stable in office today         Relevant Orders    TSH with reflex to Free T4 if abnormal (Completed)    Lipid Panel (Completed)    Comprehensive Metabolic Panel (Completed)    CBC (Completed)    Tinea cruris - Primary B35.6     - Resolved with no appreciated active inguinal rash         Postoperative hypothyroidism E89.0     - We will check thyroid level blood work ordered today  -Continue on current regimen pending blood work results to which we will make any adjustments if warranted         Erectile dysfunction N52.9     -Will initiate trial of low-dose generic Viagra with plans to closely monitor effectiveness and tolerance         Relevant Medications    sildenafil (Revatio) 20 mg tablet     Other Visit Diagnoses         Codes    Encounter for immunization     Z23    Relevant Orders    Flu vaccine, trivalent, preservative free, no egg protein, age 18y+ (Flublok) (Completed)    Prostate cancer screening     Z12.5    Relevant Orders    Prostate Spec.Ag,Screen    Need for hepatitis C screening test     Z11.59    Relevant Orders    Hepatitis C antibody    Acquired hypothyroidism     E03.9    Relevant Orders    TSH with reflex to Free T4 if abnormal (Completed)    Lipid Panel (Completed)    Comprehensive Metabolic Panel (Completed)

## 2024-09-20 NOTE — ASSESSMENT & PLAN NOTE
-Will initiate trial of low-dose generic Viagra with plans to closely monitor effectiveness and tolerance

## 2024-09-23 ENCOUNTER — APPOINTMENT (OUTPATIENT)
Dept: PRIMARY CARE | Facility: CLINIC | Age: 59
End: 2024-09-23
Payer: COMMERCIAL

## 2024-09-23 PROCEDURE — RXMED WILLOW AMBULATORY MEDICATION CHARGE

## 2024-09-24 ENCOUNTER — PHARMACY VISIT (OUTPATIENT)
Dept: PHARMACY | Facility: CLINIC | Age: 59
End: 2024-09-24
Payer: MEDICAID

## 2024-09-24 ENCOUNTER — OFFICE VISIT (OUTPATIENT)
Dept: CARDIOLOGY | Facility: CLINIC | Age: 59
End: 2024-09-24
Payer: COMMERCIAL

## 2024-09-24 VITALS
OXYGEN SATURATION: 98 % | WEIGHT: 234 LBS | HEART RATE: 67 BPM | SYSTOLIC BLOOD PRESSURE: 100 MMHG | DIASTOLIC BLOOD PRESSURE: 74 MMHG | BODY MASS INDEX: 32.64 KG/M2

## 2024-09-24 DIAGNOSIS — E78.49 OTHER HYPERLIPIDEMIA: ICD-10-CM

## 2024-09-24 DIAGNOSIS — I42.9 CARDIOMYOPATHY, UNSPECIFIED TYPE (MULTI): ICD-10-CM

## 2024-09-24 DIAGNOSIS — I10 BENIGN ESSENTIAL HYPERTENSION: Primary | ICD-10-CM

## 2024-09-24 DIAGNOSIS — I50.22 CHRONIC SYSTOLIC HEART FAILURE: ICD-10-CM

## 2024-09-24 PROCEDURE — 99214 OFFICE O/P EST MOD 30 MIN: CPT | Performed by: INTERNAL MEDICINE

## 2024-09-24 PROCEDURE — 1036F TOBACCO NON-USER: CPT | Performed by: INTERNAL MEDICINE

## 2024-09-24 PROCEDURE — RXMED WILLOW AMBULATORY MEDICATION CHARGE

## 2024-09-24 PROCEDURE — 3078F DIAST BP <80 MM HG: CPT | Performed by: INTERNAL MEDICINE

## 2024-09-24 PROCEDURE — 3074F SYST BP LT 130 MM HG: CPT | Performed by: INTERNAL MEDICINE

## 2024-09-24 RX ORDER — FUROSEMIDE 40 MG/1
40 TABLET ORAL DAILY
Qty: 90 TABLET | Refills: 3 | Status: SHIPPED | OUTPATIENT
Start: 2024-09-24 | End: 2025-09-24

## 2024-09-24 ASSESSMENT — ENCOUNTER SYMPTOMS
WEIGHT LOSS: 0
LOSS OF SENSATION IN FEET: 0
FEVER: 0
OCCASIONAL FEELINGS OF UNSTEADINESS: 1
WEAKNESS: 0
PND: 0
IRREGULAR HEARTBEAT: 0
SHORTNESS OF BREATH: 0
DYSPNEA ON EXERTION: 0
NEAR-SYNCOPE: 0
DIAPHORESIS: 0
DIZZINESS: 0
DEPRESSION: 0
SYNCOPE: 0
CLAUDICATION: 0
WHEEZING: 0
COUGH: 0
PALPITATIONS: 0
MYALGIAS: 0
ORTHOPNEA: 0
WEIGHT GAIN: 0

## 2024-09-24 ASSESSMENT — PATIENT HEALTH QUESTIONNAIRE - PHQ9
1. LITTLE INTEREST OR PLEASURE IN DOING THINGS: NOT AT ALL
2. FEELING DOWN, DEPRESSED OR HOPELESS: NOT AT ALL
SUM OF ALL RESPONSES TO PHQ9 QUESTIONS 1 AND 2: 0

## 2024-09-24 ASSESSMENT — PAIN SCALES - GENERAL: PAINLEVEL: 0-NO PAIN

## 2024-09-24 NOTE — PROGRESS NOTES
Subjective      Chief Complaint   Patient presents with    Follow-up        59-year-old male with history of alcohol abuse seen in the hospital in November 2022 with acute coronavirus infection.  He has significant lower extremity swelling.  An echocardiogram identified severe left ventricular systolic dysfunction with an ejection fraction of 20 to 25%, this is increased to 35% on the most recent study.  He underwent coronary angiography to reveal normal coronary arteries.  He was started on guideline directed medical therapy as well as an oral diuretic.  The thought was that his dilated cardiomyopathy could be secondary to alcohol abuse.          Review of Systems   Constitutional: Negative for diaphoresis, fever, weight gain and weight loss.   Eyes:  Negative for visual disturbance.   Cardiovascular:  Negative for chest pain, claudication, dyspnea on exertion, irregular heartbeat, leg swelling, near-syncope, orthopnea, palpitations, paroxysmal nocturnal dyspnea and syncope.   Respiratory:  Negative for cough, shortness of breath and wheezing.    Musculoskeletal:  Negative for muscle weakness and myalgias.   Neurological:  Negative for dizziness and weakness.   All other systems reviewed and are negative.       Past Medical History:   Diagnosis Date    Anxiety 4/01/23    Cancer (Multi) 10/01/0-    CHF (congestive heart failure) (Multi) 10/01/23    Depression 10/01/23    Extremity pain     Headache     Hypothyroidism     Low back pain     Thyroid cancer (Multi)         Past Surgical History:   Procedure Laterality Date    APPENDECTOMY  10/01/96    CARDIAC CATHETERIZATION      COLONOSCOPY  09/11/2020    HERNIA REPAIR      THYROIDECTOMY      VASECTOMY          Social History     Socioeconomic History    Marital status:      Spouse name: Not on file    Number of children: Not on file    Years of education: Not on file    Highest education level: Not on file   Occupational History    Not on file   Tobacco Use     Smoking status: Former     Current packs/day: 0.00     Average packs/day: 1 pack/day for 15.0 years (15.0 ttl pk-yrs)     Types: Cigarettes     Start date: 2008     Quit date: 2023     Years since quittin.2    Smokeless tobacco: Never   Vaping Use    Vaping status: Never Used   Substance and Sexual Activity    Alcohol use: Not Currently    Drug use: Never    Sexual activity: Defer     Partners: Female     Birth control/protection: None   Other Topics Concern    Not on file   Social History Narrative    Not on file     Social Determinants of Health     Financial Resource Strain: Not on file   Food Insecurity: Not on file   Transportation Needs: Not on file   Physical Activity: Not on file   Stress: Not on file   Social Connections: Not on file   Intimate Partner Violence: Not on file   Housing Stability: Not on file        Family History   Problem Relation Name Age of Onset    Heart disease Mother Kim     Heart disease Sister (1)     Other (pacemaker) Sister (1)     Heart disease Brother Skye     Atrial fibrillation Brother Gilberto         OBJECTIVE:    Vitals:    24 1318   BP: 100/74   Pulse: 67   SpO2: 98%        Vitals reviewed.   Constitutional:       Appearance: Normal and healthy appearance. Not in distress.   Pulmonary:      Effort: Pulmonary effort is normal.      Breath sounds: Normal breath sounds.   Cardiovascular:      Normal rate. Regular rhythm. Normal S1. Normal S2.       Murmurs: There is no murmur.      No gallop.  No click.   Pulses:     Intact distal pulses.   Edema:     Peripheral edema absent.   Skin:     General: Skin is warm and dry.   Neurological:      General: No focal deficit present.          Lab Review:   Lab Results   Component Value Date    CHOL 214 (H) 2024    TRIG 90 2024    HDL 50.5 2024       Lab Results   Component Value Date    LDLCALC 146 (H) 2024        Benign essential hypertension  Controlled. Continue current medical  therapy    Cardiomyopathy (Multi)  Non-ischemic. Continue current medical therapy.     Chronic systolic heart failure (Multi)  Euvolemic, continue BB, ARNI, loop diuretic. SGLT2-Inh is too expensive.

## 2024-09-24 NOTE — ASSESSMENT & PLAN NOTE
>>ASSESSMENT AND PLAN FOR BENIGN ESSENTIAL HYPERTENSION WRITTEN ON 9/24/2024  1:38 PM BY TREVOR CORTEZ, DO [place]. Continue current medical therapy

## 2024-10-06 PROCEDURE — RXMED WILLOW AMBULATORY MEDICATION CHARGE

## 2024-10-09 PROCEDURE — RXMED WILLOW AMBULATORY MEDICATION CHARGE

## 2024-10-14 ENCOUNTER — PHARMACY VISIT (OUTPATIENT)
Dept: PHARMACY | Facility: CLINIC | Age: 59
End: 2024-10-14
Payer: COMMERCIAL

## 2024-10-14 ENCOUNTER — OFFICE VISIT (OUTPATIENT)
Dept: PAIN MEDICINE | Facility: CLINIC | Age: 59
End: 2024-10-14
Payer: COMMERCIAL

## 2024-10-14 VITALS
WEIGHT: 234 LBS | OXYGEN SATURATION: 97 % | HEIGHT: 71 IN | DIASTOLIC BLOOD PRESSURE: 90 MMHG | BODY MASS INDEX: 32.76 KG/M2 | RESPIRATION RATE: 20 BRPM | SYSTOLIC BLOOD PRESSURE: 125 MMHG | HEART RATE: 100 BPM

## 2024-10-14 DIAGNOSIS — M48.061 SPINAL STENOSIS OF LUMBAR REGION WITHOUT NEUROGENIC CLAUDICATION: ICD-10-CM

## 2024-10-14 DIAGNOSIS — Z79.891 LONG TERM CURRENT USE OF OPIATE ANALGESIC: Primary | ICD-10-CM

## 2024-10-14 DIAGNOSIS — M46.1 SACROILIITIS, NOT ELSEWHERE CLASSIFIED (CMS-HCC): ICD-10-CM

## 2024-10-14 DIAGNOSIS — M54.16 LUMBAR RADICULOPATHY: ICD-10-CM

## 2024-10-14 PROCEDURE — 3074F SYST BP LT 130 MM HG: CPT | Performed by: ANESTHESIOLOGY

## 2024-10-14 PROCEDURE — 99214 OFFICE O/P EST MOD 30 MIN: CPT | Performed by: ANESTHESIOLOGY

## 2024-10-14 PROCEDURE — 3080F DIAST BP >= 90 MM HG: CPT | Performed by: ANESTHESIOLOGY

## 2024-10-14 PROCEDURE — 80307 DRUG TEST PRSMV CHEM ANLYZR: CPT | Mod: WESLAB | Performed by: ANESTHESIOLOGY

## 2024-10-14 PROCEDURE — RXMED WILLOW AMBULATORY MEDICATION CHARGE

## 2024-10-14 PROCEDURE — 82570 ASSAY OF URINE CREATININE: CPT | Mod: 59,WESLAB | Performed by: ANESTHESIOLOGY

## 2024-10-14 PROCEDURE — 1036F TOBACCO NON-USER: CPT | Performed by: ANESTHESIOLOGY

## 2024-10-14 PROCEDURE — 3008F BODY MASS INDEX DOCD: CPT | Performed by: ANESTHESIOLOGY

## 2024-10-14 RX ORDER — OXYCODONE AND ACETAMINOPHEN 7.5; 325 MG/1; MG/1
1 TABLET ORAL 4 TIMES DAILY PRN
Qty: 120 TABLET | Refills: 0 | Status: SHIPPED | OUTPATIENT
Start: 2024-10-14 | End: 2024-11-13

## 2024-10-14 ASSESSMENT — ENCOUNTER SYMPTOMS
RESPIRATORY NEGATIVE: 1
CONSTITUTIONAL NEGATIVE: 1
WEAKNESS: 1
ENDOCRINE NEGATIVE: 1
NUMBNESS: 1
PSYCHIATRIC NEGATIVE: 1
EYES NEGATIVE: 1
GASTROINTESTINAL NEGATIVE: 1
BACK PAIN: 1
CARDIOVASCULAR NEGATIVE: 1
HEMATOLOGIC/LYMPHATIC NEGATIVE: 1

## 2024-10-14 ASSESSMENT — PAIN - FUNCTIONAL ASSESSMENT: PAIN_FUNCTIONAL_ASSESSMENT: 0-10

## 2024-10-14 ASSESSMENT — PAIN SCALES - GENERAL
PAINLEVEL_OUTOF10: 9
PAINLEVEL: 9

## 2024-10-14 ASSESSMENT — PAIN DESCRIPTION - DESCRIPTORS: DESCRIPTORS: NUMBNESS;ACHING;DISCOMFORT

## 2024-10-14 NOTE — PROGRESS NOTES
MEDICATION NAME: Percocet  STRENGTH: 7.5/325 mg  LAST FILL DATE: 2024  DATE LAST TAKEN: 10/14/2024  QUANTITY FILLED: 120  QUANTITY REMAININ  COUNT COMPLETED BY: TAY JACKSON RN and PEG CAMPBELL MD      UDS LAST COMPLETED:   CONTROLLED SUBSTANCES AGREEMENT LAST SIGNED:   ORT LAST COMPLETED:  Modified [employer] disability form filled out annually.

## 2024-10-14 NOTE — PROGRESS NOTES
PAIN MANAGEMENT FOLLOW-UP OFFICE NOTE    Date of Service: 10/14/2024    SUBJECTIVE    CHIEF COMPLAINT: LBP    HISTORY OF PRESENT ILLNESS    Todd Small is a 59 y.o. male with PMH CHF 2/2 former EtOH use, former smoker, CKD, MDD, papillary thyroid ca s/p thyroidectomy/RT with hypothyroidism who presents for F/U    Since his last visit, pt did not schedule dx BL SIJ injxn. Scheduled CT pelvis for tomorrow. LBP beginning to radiate to BLE again. Taking Percocet 7.5 mg QID PRN with good relief. Denies SE. Requests RF    Pt denies new-onset bowel/bladder incontinence.  Pt denies recent infection, allergy to Latex/iodine/contrast. Patient is currently taking the following blood thinner(s): N/A    Procedure log:  -BL SIJ  CSI 9/3/24: 100% relief 3 hours before return to baseline   -BL L3-4 TFESI 7/30/24: 50% relief in his legs, but 30% relief in LB      REVIEW OF SYSTEMS  Review of Systems   Constitutional: Negative.    HENT: Negative.     Eyes: Negative.    Respiratory: Negative.     Cardiovascular: Negative.    Gastrointestinal: Negative.    Endocrine: Negative.    Musculoskeletal:  Positive for back pain.   Skin: Negative.    Neurological:  Positive for weakness and numbness.   Hematological: Negative.    Psychiatric/Behavioral: Negative.         PAST MEDICAL HISTORY  Past Medical History:   Diagnosis Date    Anxiety 4/01/23    Back pain     Cancer (Multi) 10/01/0-    CHF (congestive heart failure) 10/01/23    Depression 10/01/23    Extremity pain     Headache     Hypothyroidism     Low back pain     Thyroid cancer (Multi)      Past Surgical History:   Procedure Laterality Date    APPENDECTOMY  10/01/96    CARDIAC CATHETERIZATION      COLONOSCOPY  09/11/2020    HERNIA REPAIR      THYROIDECTOMY      VASECTOMY       Family History   Problem Relation Name Age of Onset    Heart disease Mother Kim     Heart disease Sister (1)     Other (pacemaker) Sister (1)     Heart disease Brother Skye     Atrial fibrillation  Brother Gilberto        CURRENT MEDICATIONS  Current Outpatient Medications   Medication Sig Dispense Refill    ALPRAZolam (Xanax) 1 mg tablet Take 1 oral tablet in the morning, 1 tablets at midday, and 1-2 tablets at bedtime as needed 360 tablet 0    amLODIPine (Norvasc) 5 mg tablet Take 1 tablet (5 mg) by mouth once daily. 90 tablet 3    carvedilol (Coreg) 12.5 mg tablet TAKE 1 TABLET BY MOUTH TWO TIMES A DAY WITH FOOD 180 tablet 3    diclofenac sodium (Voltaren) 1 % gel Apply 1 Application topically 4 times a day as needed (as needed for pain). 100 g 3    furosemide (Lasix) 40 mg tablet TAKE ONE TABLET BY MOUTH ONCE A DAY 90 tablet 3    levothyroxine (Synthroid) 150 mcg tablet Take 1 tablet (150 mcg) by mouth on MONDAY through SATURDAY, and take 1 AND 1/2 tablets on SUNDAY as directed 96 tablet 1    naloxone (Narcan) 4 mg/0.1 mL nasal spray Administer 1 spray (4 mg) into affected nostril(s) if needed for opioid reversal. May repeat every 2-3 minutes if needed, alternating nostrils, until medical assistance becomes available. 2 each 0    oxyCODONE-acetaminophen (Percocet) 7.5-325 mg tablet Take 1 tablet by mouth 4 times a day as needed for severe pain (7 - 10). Do not fill before August 13, 2024. (Patient taking differently: 1 tablet 4 times a day as needed for severe pain (7 - 10).) 120 tablet 0    potassium chloride CR (Klor-Con) 10 mEq ER tablet Take 1 tablet (10 mEq) by mouth 2 times a day. 180 tablet 3    sacubitriL-valsartan (Entresto)  mg tablet Take 1 tablet by mouth 2 times a day. 180 tablet 3    venlafaxine XR (Effexor-XR) 150 mg 24 hr capsule Take 1 capsule by mouth once a day (take with 75mg capsule for a total of 225mg daily) 90 capsule 0    venlafaxine XR (Effexor-XR) 75 mg 24 hr capsule Take one capsule once a day (take with 150mg capsule for a total of 225mg daily) 90 capsule 0    sildenafil (Revatio) 20 mg tablet Take 1 tablet (20 mg) by mouth 30-45 minutes prior to sexual activity. Do not  "exceed 100mg in a 24 hour period 12 tablet 2     No current facility-administered medications for this visit.       ALLERGIES AND DRUG REACTIONS  Allergies   Allergen Reactions    Latex Rash    Sulfa (Sulfonamide Antibiotics) Rash and Unknown          OBJECTIVE  Visit Vitals  /90   Pulse 100   Resp 20   Ht 1.803 m (5' 11\")   Wt 106 kg (234 lb)   SpO2 97%   BMI 32.64 kg/m²   Smoking Status Former   BSA 2.3 m²       Last Recorded Pain Score (if available):                Physical Exam  General: Sitting in chair, NAD, antalgic gait with cane and hunched forward  Head: NCAT  Eyes: Sclera/conjunctiva clear, EOMI, PERRL  Nose/mouth: MMM  CV: Good distal pulses  Lungs: Good/equal chest excursion  Abdomen: Soft, ND  Ext: No cyanosis/edema  MSK: L-spine alignment: unremarkable, BL paraspinal m/BL PSIS TTP, L-spine ROM: ext limited by pain  +Jaspreet BL, JEANINE BL, thigh thrust BL, [place] BL    Neuro: AAOx3, CN grossly nl    Psych: affect nl, affect nl  Skin: no rash      REVIEW OF LABORATORY DATA  I have reviewed the following lab results:  WBC   Date Value Ref Range Status   09/20/2024 7.6 4.4 - 11.3 x10*3/uL Final     RBC   Date Value Ref Range Status   09/20/2024 4.06 (L) 4.50 - 5.90 x10*6/uL Final     Hemoglobin   Date Value Ref Range Status   09/20/2024 12.4 (L) 13.5 - 17.5 g/dL Final     Hematocrit   Date Value Ref Range Status   09/20/2024 38.9 (L) 41.0 - 52.0 % Final     MCV   Date Value Ref Range Status   09/20/2024 96 80 - 100 fL Final     MCH   Date Value Ref Range Status   09/20/2024 30.5 26.0 - 34.0 pg Final     MCHC   Date Value Ref Range Status   09/20/2024 31.9 (L) 32.0 - 36.0 g/dL Final     RDW   Date Value Ref Range Status   09/20/2024 13.3 11.5 - 14.5 % Final     Platelets   Date Value Ref Range Status   09/20/2024 222 150 - 450 x10*3/uL Final     MPV   Date Value Ref Range Status   11/22/2022 9.4 7.0 - 12.6 CU Final     Sodium   Date Value Ref Range Status   09/20/2024 136 136 - 145 mmol/L Final " "    Potassium   Date Value Ref Range Status   09/20/2024 4.9 3.5 - 5.3 mmol/L Final     Bicarbonate   Date Value Ref Range Status   09/20/2024 31 21 - 32 mmol/L Final     Urea Nitrogen   Date Value Ref Range Status   09/20/2024 17 6 - 23 mg/dL Final     Calcium   Date Value Ref Range Status   09/20/2024 8.8 8.6 - 10.3 mg/dL Final     No results found for: \"PROTIME\", \"PTT\", \"INR\", \"FIBRINOGEN\"      REVIEW OF RADIOLOGY   I have reviewed the following:  Radiology Studies           MRI L-spine 7/3/24:         ASSESSMENT & PLAN  Todd Small is a 59 y.o. male with PMH CHF 2/2 former EtOH use, former smoker, CKD, MDD, papillary thyroid ca s/p thyroidectomy/RT with hypothyroidism who presents  for F/U    1) Lumbar spinal stenosis  -LBP radiating down BLE with standing/walking affecting gait, function, ADLs, and QoL  -Refractive to Tylenol, biofreeze, SNRI, Norco  -NSAIDs contraindicated 2/2 CKD. MDP may worsen rash  -Unable to meaningfully participate in PT 2/2 pain severity and late-stage CHF  -MRI L-spine 7/3/24:  multilevel spondylosis featuring moderate L3-4 stenosis  -BL L3-4 TFESI 7/30/24: 50% relief in his legs, but 30% relief in LB  -Consider repeat after SIJ injxns    2) Sacroiliitis  -BL SIJ-mediated pain reproduced on multiple SIJ-provocative maneuvers  -Refractive to yrs of conservative tx including Tylenol, biofreeze, Norco  -SIJ fusion work-up  --Therapeutic BL SIJ CSI 9/3/24: 100% relief 3 h  --Reminded to schedule diagnostic BL SIJ injection #1 with local anesthetic only to assess candidacy for fusion  --CT pelvis    3) Long-term opiate use  -2/2 chronic LB and leg pain refractive to conservative, interventional tx  -On Percocet 7.5 mg QID PRN (45 MME) with moderate relief. Denies SE. Requests RF  - reviewed/appropriate. Reviewed DS 7/1/24: OK. No sign of aberrant behavior. UDS today  -Educated on/prescribed Narcan 8/12/24 2/2 benzo co-prescription  -RF Percocet x30 d  -F/U 1 mo        Discussed " procedure risks/benefits in detail with patient. Pt meets medical necessity for procedure due to failure of conservative measures. Reviewed procedural risks including bleeding, infection, nerve damage, paralysis. Also reviewed mitigating factors such as screening for infection/blood thinner use, sterile precautions, and image-guidance when applicable. All questions answered. Pt/guardian expressed understanding and choose to proceed                 Nathaly Cantrell MD  Anesthesiologist & Interventional Pain Physician   [facility] [place]  O: [phone]  F: [phone]  9:36 AM  10/14/24

## 2024-10-15 ENCOUNTER — HOSPITAL ENCOUNTER (OUTPATIENT)
Dept: RADIOLOGY | Facility: HOSPITAL | Age: 59
Discharge: HOME | End: 2024-10-15
Payer: COMMERCIAL

## 2024-10-15 DIAGNOSIS — M46.1 SACROILIITIS (CMS-HCC): ICD-10-CM

## 2024-10-15 LAB
AMPHETAMINES UR QL SCN: NORMAL
BARBITURATES UR QL SCN: NORMAL
BZE UR QL SCN: NORMAL
CANNABINOIDS UR QL SCN: NORMAL
CREAT UR-MCNC: 188.8 MG/DL (ref 20–370)
PCP UR QL SCN: NORMAL

## 2024-10-15 PROCEDURE — 72192 CT PELVIS W/O DYE: CPT

## 2024-10-16 LAB
1OH-MIDAZOLAM UR CFM-MCNC: <25 NG/ML
6MAM UR CFM-MCNC: <25 NG/ML
7AMINOCLONAZEPAM UR CFM-MCNC: ABNORMAL NG/ML
A-OH ALPRAZ UR CFM-MCNC: >1000 NG/ML
ALPRAZ UR CFM-MCNC: 870 NG/ML
CHLORDIAZEP UR CFM-MCNC: <25 NG/ML
CLONAZEPAM UR CFM-MCNC: <25 NG/ML
CODEINE UR CFM-MCNC: <50 NG/ML
DIAZEPAM UR CFM-MCNC: <25 NG/ML
EDDP UR CFM-MCNC: <25 NG/ML
FENTANYL UR CFM-MCNC: <2.5 NG/ML
HYDROCODONE CTO UR CFM-MCNC: <25 NG/ML
HYDROMORPHONE UR CFM-MCNC: <25 NG/ML
LORAZEPAM UR CFM-MCNC: <25 NG/ML
METHADONE UR CFM-MCNC: <25 NG/ML
MIDAZOLAM UR CFM-MCNC: <25 NG/ML
MORPHINE UR CFM-MCNC: <50 NG/ML
NORDIAZEPAM UR CFM-MCNC: <25 NG/ML
NORFENTANYL UR CFM-MCNC: <2.5 NG/ML
NORHYDROCODONE UR CFM-MCNC: <25 NG/ML
NOROXYCODONE UR CFM-MCNC: >1000 NG/ML
NORTRAMADOL UR-MCNC: <50 NG/ML
OXAZEPAM UR CFM-MCNC: <25 NG/ML
OXYCODONE UR CFM-MCNC: >2500 NG/ML
OXYMORPHONE UR CFM-MCNC: >2500 NG/ML
TEMAZEPAM UR CFM-MCNC: <25 NG/ML
TRAMADOL UR CFM-MCNC: <50 NG/ML
ZOLPIDEM UR CFM-MCNC: <25 NG/ML
ZOLPIDEM UR-MCNC: <25 NG/ML

## 2024-10-21 ENCOUNTER — PREP FOR PROCEDURE (OUTPATIENT)
Dept: PAIN MEDICINE | Facility: CLINIC | Age: 59
End: 2024-10-21
Payer: COMMERCIAL

## 2024-10-21 DIAGNOSIS — M46.1 SACROILIITIS, NOT ELSEWHERE CLASSIFIED (CMS-HCC): Primary | ICD-10-CM

## 2024-10-21 LAB
1OH-MIDAZOLAM UR CFM-MCNC: <25 NG/ML
6MAM UR CFM-MCNC: <25 NG/ML
7AMINOCLONAZEPAM UR CFM-MCNC: <25 NG/ML
A-OH ALPRAZ UR CFM-MCNC: >1000 NG/ML
ALPRAZ UR CFM-MCNC: 870 NG/ML
CHLORDIAZEP UR CFM-MCNC: <25 NG/ML
CLONAZEPAM UR CFM-MCNC: <25 NG/ML
CODEINE UR CFM-MCNC: <50 NG/ML
DIAZEPAM UR CFM-MCNC: <25 NG/ML
EDDP UR CFM-MCNC: <25 NG/ML
FENTANYL UR CFM-MCNC: <2.5 NG/ML
HYDROCODONE CTO UR CFM-MCNC: <25 NG/ML
HYDROMORPHONE UR CFM-MCNC: <25 NG/ML
LORAZEPAM UR CFM-MCNC: <25 NG/ML
METHADONE UR CFM-MCNC: <25 NG/ML
MIDAZOLAM UR CFM-MCNC: <25 NG/ML
MORPHINE UR CFM-MCNC: <50 NG/ML
NORDIAZEPAM UR CFM-MCNC: <25 NG/ML
NORFENTANYL UR CFM-MCNC: <2.5 NG/ML
NORHYDROCODONE UR CFM-MCNC: <25 NG/ML
NOROXYCODONE UR CFM-MCNC: >1000 NG/ML
NORTRAMADOL UR-MCNC: <50 NG/ML
OXAZEPAM UR CFM-MCNC: <25 NG/ML
OXYCODONE UR CFM-MCNC: >2500 NG/ML
OXYMORPHONE UR CFM-MCNC: >2500 NG/ML
TEMAZEPAM UR CFM-MCNC: <25 NG/ML
TRAMADOL UR CFM-MCNC: <50 NG/ML
ZOLPIDEM UR CFM-MCNC: <25 NG/ML
ZOLPIDEM UR-MCNC: <25 NG/ML

## 2024-10-22 ENCOUNTER — TELEPHONE (OUTPATIENT)
Dept: PAIN MEDICINE | Facility: CLINIC | Age: 59
End: 2024-10-22
Payer: COMMERCIAL

## 2024-10-22 ENCOUNTER — PHARMACY VISIT (OUTPATIENT)
Dept: PHARMACY | Facility: CLINIC | Age: 59
End: 2024-10-22
Payer: COMMERCIAL

## 2024-10-22 PROCEDURE — RXMED WILLOW AMBULATORY MEDICATION CHARGE

## 2024-10-25 ENCOUNTER — PREP FOR PROCEDURE (OUTPATIENT)
Dept: PAIN MEDICINE | Facility: CLINIC | Age: 59
End: 2024-10-25
Payer: COMMERCIAL

## 2024-10-25 DIAGNOSIS — M48.061 SPINAL STENOSIS OF LUMBAR REGION WITHOUT NEUROGENIC CLAUDICATION: Primary | ICD-10-CM

## 2024-10-25 PROCEDURE — RXMED WILLOW AMBULATORY MEDICATION CHARGE

## 2024-10-29 ENCOUNTER — PHARMACY VISIT (OUTPATIENT)
Dept: PHARMACY | Facility: CLINIC | Age: 59
End: 2024-10-29
Payer: MEDICAID

## 2024-10-29 ENCOUNTER — ANCILLARY PROCEDURE (OUTPATIENT)
Dept: RADIOLOGY | Facility: EXTERNAL LOCATION | Age: 59
End: 2024-10-29
Payer: COMMERCIAL

## 2024-10-29 DIAGNOSIS — M46.1 SACROILIITIS, NOT ELSEWHERE CLASSIFIED (CMS-HCC): ICD-10-CM

## 2024-10-29 PROCEDURE — 27096 INJECT SACROILIAC JOINT: CPT | Performed by: ANESTHESIOLOGY

## 2024-11-05 ENCOUNTER — TELEPHONE (OUTPATIENT)
Dept: PAIN MEDICINE | Facility: CLINIC | Age: 59
End: 2024-11-05
Payer: COMMERCIAL

## 2024-11-05 NOTE — TELEPHONE ENCOUNTER
Received message from [facility] that patient showed up for his procedure that was not scheduled. I called the patient back and reiterated that he was on the schedule TENTATIVELY pending insurance company approval . Patient asked me if I remember our conversation and I said I did remember that I told him we would send the request to his insurance company for his epidural and we could put him on TENTATIVELY for his epidural steroid injection on 11/05/2024 PENDING the insurance companies approval. He called me a liar and I offered for him to speak to my supervisor he declined stating he would speak to Dr. Cantrell. I called Dr. Cantrell and made him aware.

## 2024-11-05 NOTE — TELEPHONE ENCOUNTER
"Patient arrived at  [facility] ~0600 per staff. Office staff and ASC management verified that patient had been told his procedure was not yet approved and that he should not plan to come in. Patient claims he was told to come in. Multiple staff had noted patient was being confrontational and accusing them of lying to him. He also casually threatened that he would alert the local news channels and a . I went to speak with patient along with Leah from [facility]. Patient reported he was pleased to see me and I was \"the second best physician\" he has ever had. Discussed that I was more than happy to continue caring for patient to the best of my ability, but if the JUJU is not yet approved by insurance then [facility] would treat him as self pay add-on today. Patient decided he would move forward with self-pay.    Although it is reported from numerous staff members that it was not recommended patient come to [facility] today as injxn not approved, I  advised patient that I would be happy to remedy any misunderstandings, but he had to change his adversarial approach to staff members. The staff were present to assist with care and they need to be addressed with respect in order to help him.  Patient apologized and said that his comments were meant to be taken as sarcastic jokes. He wanted to continue his care with me.    After stepping away, I was informed patient did not want to proceed with self-pay and would await insurance approval.        Nathaly Cantrell MD  Anesthesiologist & Interventional Pain Physician   [facility]  O: [phone]  F: [phone]  9:27 AM  11/05/24    "

## 2024-11-11 ENCOUNTER — OFFICE VISIT (OUTPATIENT)
Dept: PAIN MEDICINE | Facility: CLINIC | Age: 59
End: 2024-11-11
Payer: COMMERCIAL

## 2024-11-11 ENCOUNTER — DOCUMENTATION (OUTPATIENT)
Dept: PAIN MEDICINE | Facility: CLINIC | Age: 59
End: 2024-11-11

## 2024-11-11 VITALS
HEIGHT: 71 IN | DIASTOLIC BLOOD PRESSURE: 90 MMHG | RESPIRATION RATE: 20 BRPM | HEART RATE: 91 BPM | BODY MASS INDEX: 32.76 KG/M2 | OXYGEN SATURATION: 98 % | WEIGHT: 234 LBS | SYSTOLIC BLOOD PRESSURE: 139 MMHG

## 2024-11-11 DIAGNOSIS — Z79.891 LONG TERM CURRENT USE OF OPIATE ANALGESIC: ICD-10-CM

## 2024-11-11 DIAGNOSIS — M48.061 SPINAL STENOSIS OF LUMBAR REGION WITHOUT NEUROGENIC CLAUDICATION: Primary | ICD-10-CM

## 2024-11-11 DIAGNOSIS — M46.1 SACROILIITIS, NOT ELSEWHERE CLASSIFIED (CMS-HCC): ICD-10-CM

## 2024-11-11 DIAGNOSIS — M54.16 LUMBAR RADICULOPATHY: ICD-10-CM

## 2024-11-11 PROCEDURE — 1036F TOBACCO NON-USER: CPT | Performed by: ANESTHESIOLOGY

## 2024-11-11 PROCEDURE — 99215 OFFICE O/P EST HI 40 MIN: CPT | Performed by: ANESTHESIOLOGY

## 2024-11-11 PROCEDURE — 3080F DIAST BP >= 90 MM HG: CPT | Performed by: ANESTHESIOLOGY

## 2024-11-11 PROCEDURE — 3075F SYST BP GE 130 - 139MM HG: CPT | Performed by: ANESTHESIOLOGY

## 2024-11-11 PROCEDURE — 3008F BODY MASS INDEX DOCD: CPT | Performed by: ANESTHESIOLOGY

## 2024-11-11 PROCEDURE — 99417 PROLNG OP E/M EACH 15 MIN: CPT | Performed by: ANESTHESIOLOGY

## 2024-11-11 RX ORDER — OXYCODONE AND ACETAMINOPHEN 7.5; 325 MG/1; MG/1
1 TABLET ORAL 4 TIMES DAILY PRN
Qty: 120 TABLET | Refills: 0 | Status: SHIPPED | OUTPATIENT
Start: 2024-11-13 | End: 2024-12-13

## 2024-11-11 ASSESSMENT — ENCOUNTER SYMPTOMS
CONSTITUTIONAL NEGATIVE: 1
PSYCHIATRIC NEGATIVE: 1
DEPRESSION: 1
RESPIRATORY NEGATIVE: 1
OCCASIONAL FEELINGS OF UNSTEADINESS: 1
WEAKNESS: 1
ENDOCRINE NEGATIVE: 1
EYES NEGATIVE: 1
CARDIOVASCULAR NEGATIVE: 1
BACK PAIN: 1
HEMATOLOGIC/LYMPHATIC NEGATIVE: 1
GASTROINTESTINAL NEGATIVE: 1
LOSS OF SENSATION IN FEET: 0
NUMBNESS: 1

## 2024-11-11 ASSESSMENT — PATIENT HEALTH QUESTIONNAIRE - PHQ9
6. FEELING BAD ABOUT YOURSELF - OR THAT YOU ARE A FAILURE OR HAVE LET YOURSELF OR YOUR FAMILY DOWN: NOT AT ALL
1. LITTLE INTEREST OR PLEASURE IN DOING THINGS: SEVERAL DAYS
9. THOUGHTS THAT YOU WOULD BE BETTER OFF DEAD, OR OF HURTING YOURSELF: NOT AT ALL
5. POOR APPETITE OR OVEREATING: SEVERAL DAYS
SUM OF ALL RESPONSES TO PHQ QUESTIONS 1-9: 9
3. TROUBLE FALLING OR STAYING ASLEEP OR SLEEPING TOO MUCH: NEARLY EVERY DAY
2. FEELING DOWN, DEPRESSED OR HOPELESS: MORE THAN HALF THE DAYS
4. FEELING TIRED OR HAVING LITTLE ENERGY: MORE THAN HALF THE DAYS
7. TROUBLE CONCENTRATING ON THINGS, SUCH AS READING THE NEWSPAPER OR WATCHING TELEVISION: NOT AT ALL
SUM OF ALL RESPONSES TO PHQ9 QUESTIONS 1 AND 2: 3
8. MOVING OR SPEAKING SO SLOWLY THAT OTHER PEOPLE COULD HAVE NOTICED. OR THE OPPOSITE, BEING SO FIGETY OR RESTLESS THAT YOU HAVE BEEN MOVING AROUND A LOT MORE THAN USUAL: NOT AT ALL

## 2024-11-11 ASSESSMENT — PAIN SCALES - GENERAL
PAINLEVEL_OUTOF10: 10-WORST PAIN EVER
PAINLEVEL_OUTOF10: 10 - WORST POSSIBLE PAIN

## 2024-11-11 ASSESSMENT — PAIN - FUNCTIONAL ASSESSMENT: PAIN_FUNCTIONAL_ASSESSMENT: 0-10

## 2024-11-11 ASSESSMENT — PAIN DESCRIPTION - DESCRIPTORS: DESCRIPTORS: ACHING

## 2024-11-11 NOTE — PROGRESS NOTES
MEDICATION NAME: Percocet  STRENGTH: 7.5/325 mg.  LAST FILL DATE: 10/14/2024  DATE LAST TAKEN: 2024  QUANTITY FILLED: 120  QUANTITY REMAININ  COUNT COMPLETED BY: TAY JACKSON RN and PEG CAMPBELL MD      UDS LAST COMPLETED:   CONTROLLED SUBSTANCES AGREEMENT LAST SIGNED:   ORT LAST COMPLETED:  Modified [employer] disability form filled out annually.

## 2024-11-11 NOTE — PROGRESS NOTES
PAIN MANAGEMENT FOLLOW-UP OFFICE NOTE    Date of Service: 11/11/2024    SUBJECTIVE    CHIEF COMPLAINT: LBP    HISTORY OF PRESENT ILLNESS    Todd Small is a 59 y.o. male with PMH CHF 2/2 former EtOH use, former smoker, CKD, MDD, papillary thyroid ca s/p thyroidectomy/RT with hypothyroidism who presents for F/U    On 10/29, pt underwent dx BL SIJ injxn #1 with 100% relief 3 d. In the meantime, notes return of LB radiating down BLE.  s/f repeat BL L3 TFESI tomorrow. Taking Percocet 7.5 mg QID PRN with good relief. Denies SE. Requests RF.     Pt disclosed open dispute with his nephew and notes the police are aware and following his situation.    Pt denies new-onset bowel/bladder incontinence.  Pt denies recent infection, allergy to Latex/iodine/contrast. Patient is currently taking the following blood thinner(s): N/A    Procedure log:  -Dx BL SIJ injxn #1 10/29/24: 100% relief 3 d  -BL SIJ  CSI 9/3/24: 100% relief 3 hours before return to baseline   -BL L3-4 TFESI 7/30/24: 50% relief in his legs, but 30% relief in LB      REVIEW OF SYSTEMS  Review of Systems   Constitutional: Negative.    HENT: Negative.     Eyes: Negative.    Respiratory: Negative.     Cardiovascular: Negative.    Gastrointestinal: Negative.    Endocrine: Negative.    Musculoskeletal:  Positive for back pain.   Skin: Negative.    Neurological:  Positive for weakness and numbness.   Hematological: Negative.    Psychiatric/Behavioral: Negative.         PAST MEDICAL HISTORY  Past Medical History:   Diagnosis Date    Anxiety 4/01/23    Back pain     Cancer (Multi) 10/01/0-    CHF (congestive heart failure) 10/01/23    Depression 10/01/23    Extremity pain     Headache     Hypothyroidism     Low back pain     Thyroid cancer (Multi)      Past Surgical History:   Procedure Laterality Date    APPENDECTOMY  10/01/96    CARDIAC CATHETERIZATION      COLONOSCOPY  09/11/2020    HERNIA REPAIR      THYROIDECTOMY      VASECTOMY       Family History   Problem  Relation Name Age of Onset    Heart disease Mother Kim     Heart disease Sister (1)     Other (pacemaker) Sister (1)     Heart disease Brother Skye     Atrial fibrillation Brother Gilberto        CURRENT MEDICATIONS  Current Outpatient Medications   Medication Sig Dispense Refill    ALPRAZolam (Xanax) 1 mg tablet Take 1 oral tablet in the morning, 1 tablets at midday, and 1-2 tablets at bedtime as needed 360 tablet 0    amLODIPine (Norvasc) 5 mg tablet Take 1 tablet (5 mg) by mouth once daily. 90 tablet 3    carvedilol (Coreg) 12.5 mg tablet TAKE 1 TABLET BY MOUTH TWO TIMES A DAY WITH FOOD 180 tablet 3    furosemide (Lasix) 40 mg tablet TAKE ONE TABLET BY MOUTH ONCE A DAY 90 tablet 3    levothyroxine (Synthroid) 150 mcg tablet Take 1 tablet (150 mcg) by mouth on MONDAY through SATURDAY, and take 1 AND 1/2 tablets on SUNDAY as directed 96 tablet 1    naloxone (Narcan) 4 mg/0.1 mL nasal spray Administer 1 spray (4 mg) into affected nostril(s) if needed for opioid reversal. May repeat every 2-3 minutes if needed, alternating nostrils, until medical assistance becomes available. 2 each 0    oxyCODONE-acetaminophen (Percocet) 7.5-325 mg tablet Take 1 tablet by mouth 4 times a day as needed for severe pain (7 - 10). 120 tablet 0    potassium chloride CR (Klor-Con) 10 mEq ER tablet Take 1 tablet (10 mEq) by mouth 2 times a day. 180 tablet 3    sacubitriL-valsartan (Entresto)  mg tablet Take 1 tablet by mouth 2 times a day. 180 tablet 3    sildenafil (Revatio) 20 mg tablet Take 1 tablet (20 mg) by mouth 30-45 minutes prior to sexual activity. Do not exceed 100mg in a 24 hour period 12 tablet 2    venlafaxine XR (Effexor-XR) 150 mg 24 hr capsule Take 1 capsule by mouth once a day (take with 75mg capsule for a total of 225mg daily) 90 capsule 0    venlafaxine XR (Effexor-XR) 75 mg 24 hr capsule Take one capsule once a day (take with 150mg capsule for a total of 225mg daily) 90 capsule 0    diclofenac sodium (Voltaren) 1  "% gel Apply 1 Application topically 4 times a day as needed (as needed for pain). (Patient not taking: Reported on 11/11/2024) 100 g 3     No current facility-administered medications for this visit.       ALLERGIES AND DRUG REACTIONS  Allergies   Allergen Reactions    Latex Rash    Sulfa (Sulfonamide Antibiotics) Rash and Unknown          OBJECTIVE  Visit Vitals  /90   Pulse 91   Resp 20   Ht 1.803 m (5' 11\")   Wt 106 kg (234 lb)   SpO2 98%   BMI 32.64 kg/m²   Smoking Status Former   BSA 2.3 m²       Last Recorded Pain Score (if available):                Physical Exam  General: Sitting in chair, NAD, antalgic gait with cane and hunched forward  Head: NCAT  Eyes: Sclera/conjunctiva clear, EOMI, PERRL  Nose/mouth: MMM  CV: Good distal pulses  Lungs: Good/equal chest excursion  Abdomen: Soft, ND  Ext: No cyanosis/edema  MSK: L-spine alignment: unremarkable, BL paraspinal m/BL PSIS TTP, L-spine ROM: ext limited by pain  +Jaspreet BL, JEANINE BL, thigh thrust BL, [place] BL    Neuro: AAOx3, CN grossly nl   Dermatome sensation to light touch  LEFT L1 (lower pelvis/upper thigh): WNL    RIGHT L1: WNL      LEFT L2 (upper thigh): WNL       RIGHT: L2:WNL      LEFT L3 (medial knee): WNL       RIGHT L3: WNL      LEFT L4 (superior medial malleolus): WNL       RIGHT L4: WNL      LEFT L5 (dorsal foot): WNL       RIGHT L5: WNL      LEFT S1 (lateral foot): WNL     RIGHT S1: WNL      LEFT S2 (popliteal fossa): WNL    RIGHT S2: WNL        Motor strength  LEFT L2 (hip flexion): 5/5   RIGHT L2: 5/5  LEFT L3 (knee extension): 5/5     RIGHT L3: 5/5  LEFT L4 (dorsiflexion): 5/5     RIGHT L4: 5/5  LEFT L5 (EHL extension): 5/5     RIGHT L5: 5/5  LEFT S1 (plantarflexion): 5/5     RIGHT S1: 5/5  LEFT S2 (knee flexion): 5/5      RIGHT S2: 5/5    Special testing  DTR unremarkable  Seated slump test neg BL  Clonus: neg BL  Babinski: neg BL      Psych: affect nl, affect nl  Skin: no rash      REVIEW OF LABORATORY DATA  I have reviewed the " "following lab results:  WBC   Date Value Ref Range Status   09/20/2024 7.6 4.4 - 11.3 x10*3/uL Final     RBC   Date Value Ref Range Status   09/20/2024 4.06 (L) 4.50 - 5.90 x10*6/uL Final     Hemoglobin   Date Value Ref Range Status   09/20/2024 12.4 (L) 13.5 - 17.5 g/dL Final     Hematocrit   Date Value Ref Range Status   09/20/2024 38.9 (L) 41.0 - 52.0 % Final     MCV   Date Value Ref Range Status   09/20/2024 96 80 - 100 fL Final     MCH   Date Value Ref Range Status   09/20/2024 30.5 26.0 - 34.0 pg Final     MCHC   Date Value Ref Range Status   09/20/2024 31.9 (L) 32.0 - 36.0 g/dL Final     RDW   Date Value Ref Range Status   09/20/2024 13.3 11.5 - 14.5 % Final     Platelets   Date Value Ref Range Status   09/20/2024 222 150 - 450 x10*3/uL Final     MPV   Date Value Ref Range Status   11/22/2022 9.4 7.0 - 12.6 CU Final     Sodium   Date Value Ref Range Status   09/20/2024 136 136 - 145 mmol/L Final     Potassium   Date Value Ref Range Status   09/20/2024 4.9 3.5 - 5.3 mmol/L Final     Bicarbonate   Date Value Ref Range Status   09/20/2024 31 21 - 32 mmol/L Final     Urea Nitrogen   Date Value Ref Range Status   09/20/2024 17 6 - 23 mg/dL Final     Calcium   Date Value Ref Range Status   09/20/2024 8.8 8.6 - 10.3 mg/dL Final     No results found for: \"PROTIME\", \"PTT\", \"INR\", \"FIBRINOGEN\"      REVIEW OF RADIOLOGY   I have reviewed the following:  Radiology Studies           MRI L-spine 7/3/24:         ASSESSMENT & PLAN  Todd Small is a 59 y.o. male with PMH CHF 2/2 former EtOH use, former smoker, CKD, MDD, papillary thyroid ca s/p thyroidectomy/RT with hypothyroidism who presents  for F/U    1) Lumbar spinal stenosis  -LBP radiating down BLE with standing/walking affecting gait, function, ADLs, and QoL  -Refractive to Tylenol, biofreeze, SNRI, Norco  -NSAIDs contraindicated 2/2 CKD. MDP may worsen rash  -Unable to meaningfully participate in PT 2/2 pain severity and late-stage CHF  -MRI L-spine 7/3/24:  " multilevel spondylosis featuring moderate L3-4 stenosis  -BL L3-4 TFESI 7/30/24: 50% relief 3 mo  --s/f repeat tomorrow    2) Sacroiliitis  -BL SIJ-mediated pain reproduced on multiple SIJ-provocative maneuvers  -Refractive to yrs of conservative tx including Tylenol, biofreeze, Norco  -SIJ fusion work-up  --Therapeutic BL SIJ CSI 9/3/24: 100% relief 3 h  --Dx BL SIJ injxn #1 10/29/24: 100% relief 3 d  --Schedule #2 after JUJU  --Reviewed/discussed CT pelvis 10/15/24: mild BL hip OA, mild BL SIJ OA    3) Long-term opiate use  -2/2 chronic LB and leg pain refractive to conservative, interventional tx  -On Percocet 7.5 mg QID PRN (45 MME) with moderate relief. Denies SE. Requests RF  - reviewed/appropriate. Reviewed DS 10/14/24:: OK. No sign of aberrant behavior.   -Educated on/prescribed Narcan 8/12/24 2/2 benzo co-prescription  -RF Percocet x30 d. Declined dose inc cristian in context of benzo co-use  -F/U 1 mo        Discussed procedure risks/benefits in detail with patient. Pt meets medical necessity for procedure due to failure of conservative measures. Reviewed procedural risks including bleeding, infection, nerve damage, paralysis. Also reviewed mitigating factors such as screening for infection/blood thinner use, sterile precautions, and image-guidance when applicable. All questions answered. Pt/guardian expressed understanding and choose to proceed    Due to multiple issues interacting with staff where pt was behaving in an adversarial manner, pt issued behavior agreement and copy of policy. Should pt violate behavioral standards such as by berating staff or issuing expletives to them, then he may be discharged from practice. Pt expressed understanding and signed agreement.      Time spent- preparation: 4 min; obtaining history: 18 min; PE: 4 min; Counseling/education: 6 min; communicating with other providers: 0 min; documentation: 7 min; independent result interpretation: 0 min; care coordination: 2 min; TOTAL  ENCOUNTER TIME: 41 min                 Nathaly Cantrell MD  Anesthesiologist & Interventional Pain Physician   [facility] [place]  O: [phone]  F: [phone]  9:29 AM  11/11/24

## 2024-11-11 NOTE — LETTER
Date: 11/11/2024    We value you as a patient and want to continue to provide you with high-quality care and service. To do so, we need to set boundaries and expectations that will foster an effective provider-patient relationship. Below is a contract that outlines the behaviors we will expect from you. In return, we will make every effort to accommodate you and your needs.     This agreement is between Todd Small and [facility].  In an effort to better care for Todd Small, the following expectations are required to maintain an effective provider-patient relationship.       Initial each line    ___ I will treat staff with respect and dignity.   ___ I will use a normal tone of speech when speaking.   ___ I will not make comments about staff appearance.  ___ I will not use foul language or show aggressive behavior (both verbal and physical).  ___ I will comply with my treatment plan and recommendations.       ___ I have read and understand the above-listed behavioral expectations. I also understand that failure to meet these expectations may result in immediate termination of the relationship between me and this provider/organization.   ___ I have received a copy of the “Patient Rights and Responsibilities” brochure.     Patient/Family/POA signature: _____________________  Date: ________________   Provider signature: _______________________________ Date: _________________

## 2024-11-12 ENCOUNTER — ANCILLARY PROCEDURE (OUTPATIENT)
Dept: RADIOLOGY | Facility: EXTERNAL LOCATION | Age: 59
End: 2024-11-12
Payer: COMMERCIAL

## 2024-11-12 DIAGNOSIS — M54.16 RADICULOPATHY, LUMBAR REGION: ICD-10-CM

## 2024-11-12 PROCEDURE — 64483 NJX AA&/STRD TFRM EPI L/S 1: CPT | Performed by: ANESTHESIOLOGY

## 2024-11-12 NOTE — PROGRESS NOTES
OPERATIVE NOTE  Preprocedure diagnosis: LSS  Postprocedure diagnosis LSS    Procedure performed: BL L3-4 TFESI  Physician: Nathaly Cantrell MD  Anesthesia: Local  Complications: none  Blood loss:  Nil    Clinical note: This is a 59 y.o. male with PMH CHF 2/2 former EtOH use, former smoker, CKD, MDD, papillary thyroid ca s/p thyroidectomy/RT with hypothyroidism who suffers with LBP here meeting all medical criteria for above-mentioned procedure. Patient's pain stable and persistent from last visit.   Denies allergies to steroids, local anesthetics, or iodine/contrast. Denies being on blood thinners. Not diabetic.  Denies fever, chills, NS, CP, SOB, cough, N/V.    Discussed procedure risks/benefits in detail with patient. Pt meets medical necessity for procedure due to failure of conservative measures. Reviewed procedural risks including bleeding, infection, nerve damage, paralysis. Also reviewed mitigating factors such as screening for infection/blood thinner use, sterile precautions, and image-guidance when applicable. All questions answered. Pt/guardian expressed understanding and choose to proceed      Procedure:    PROCEDURE  Bilateral L3-4 transforaminal epidural steroid injection    [facility]  Anesthesia: Local    Informed consent completed.  Patient brought to procedure suite and positioned on table prone.  Lower back prepped with chlorhexidine and draped in sterile fashion.  Fluoroscopy utilized to identify L3-4 level.  C-arm was obliqued to left side to visualize L3-4 foramen.  1.5 inch 25-gauge needle used to administer 1% lidocaine for local anesthesia.  Then, 3.5 inch 22-gauge needle were advanced coaxially to superolateral aspect of foramina via x-ray guidance.  AP view utilized to visualize needles approaching junction of the transverse process and vertebral body.  Lateral view taken to visualize needles just anterior to posterior spinal column.  Needle was then advanced slightly and  after negative add aspiration, 1.5 cc Omnipaque 240 administered at each level revealing adequate epidural spread without vascular uptake.  AP view taken to confirm epidural spread medially without vascular uptake.     After negative aspiration, 7.5 mg dexamethasone +1.25  cc preservative-free saline injected.      Procedure was then repeated in similar fashion on the right side without issue.       Total Injectate: 15 mg dexamethasone + 2.5 ml PF NS    Needles re-styleted and removed.  Hemostasis achieved.  Band-Aids placed.      Sedation: N/A    Patient tolerated procedure well and without issue.  They were discharged home in stable condition.      Nathaly Catnrell MD  Anesthesiologist & Interventional Pain Physician   [facility] [place]  O: [phone]  F: [phone]  8:13 AM  11/12/24

## 2024-11-13 ENCOUNTER — PHARMACY VISIT (OUTPATIENT)
Dept: PHARMACY | Facility: CLINIC | Age: 59
End: 2024-11-13
Payer: MEDICAID

## 2024-11-13 ENCOUNTER — E-VISIT (OUTPATIENT)
Dept: PRIMARY CARE | Facility: CLINIC | Age: 59
End: 2024-11-13
Payer: COMMERCIAL

## 2024-11-13 DIAGNOSIS — R06.6 INTRACTABLE HICCUPS: Primary | ICD-10-CM

## 2024-11-13 PROCEDURE — RXMED WILLOW AMBULATORY MEDICATION CHARGE

## 2024-11-13 RX ORDER — BACLOFEN 5 MG/1
5 TABLET ORAL 3 TIMES DAILY
Qty: 12 TABLET | Refills: 0 | Status: SHIPPED | OUTPATIENT
Start: 2024-11-13 | End: 2024-11-17

## 2024-11-15 ENCOUNTER — PHARMACY VISIT (OUTPATIENT)
Dept: PHARMACY | Facility: CLINIC | Age: 59
End: 2024-11-15
Payer: COMMERCIAL

## 2024-11-15 PROCEDURE — RXOTC WILLOW AMBULATORY OTC CHARGE

## 2024-11-25 ENCOUNTER — PATIENT MESSAGE (OUTPATIENT)
Dept: PAIN MEDICINE | Facility: CLINIC | Age: 59
End: 2024-11-25
Payer: COMMERCIAL

## 2024-11-25 DIAGNOSIS — G89.29 CHRONIC LOW BACK PAIN, UNSPECIFIED BACK PAIN LATERALITY, UNSPECIFIED WHETHER SCIATICA PRESENT: Primary | ICD-10-CM

## 2024-11-25 DIAGNOSIS — M54.50 CHRONIC LOW BACK PAIN, UNSPECIFIED BACK PAIN LATERALITY, UNSPECIFIED WHETHER SCIATICA PRESENT: Primary | ICD-10-CM

## 2024-12-02 PROCEDURE — RXMED WILLOW AMBULATORY MEDICATION CHARGE

## 2024-12-02 RX ORDER — LIDOCAINE 560 MG/1
1 PATCH PERCUTANEOUS; TOPICAL; TRANSDERMAL EVERY 12 HOURS PRN
Qty: 30 PATCH | Refills: 2 | Status: SHIPPED | OUTPATIENT
Start: 2024-12-02 | End: 2025-01-01

## 2024-12-04 ENCOUNTER — TELEPHONE (OUTPATIENT)
Dept: PAIN MEDICINE | Facility: CLINIC | Age: 59
End: 2024-12-04
Payer: COMMERCIAL

## 2024-12-04 ASSESSMENT — PROMIS GLOBAL HEALTH SCALE
RATE_PHYSICAL_HEALTH: POOR
RATE_MENTAL_HEALTH: FAIR
RATE_SOCIAL_SATISFACTION: POOR
RATE_AVERAGE_FATIGUE: MODERATE
RATE_GENERAL_HEALTH: POOR
CARRYOUT_PHYSICAL_ACTIVITIES: A LITTLE
EMOTIONAL_PROBLEMS: OFTEN
CARRYOUT_SOCIAL_ACTIVITIES: FAIR
RATE_AVERAGE_PAIN: 8
RATE_QUALITY_OF_LIFE: GOOD

## 2024-12-04 NOTE — TELEPHONE ENCOUNTER
Pt called and left message for him that we will know if insurance covers his procedure when it is submitted. Also let him know his Lidocaine patch has been ordered. Told him to call us with any questions.

## 2024-12-05 ENCOUNTER — TELEPHONE (OUTPATIENT)
Dept: PAIN MEDICINE | Facility: CLINIC | Age: 59
End: 2024-12-05
Payer: COMMERCIAL

## 2024-12-05 NOTE — TELEPHONE ENCOUNTER
Pt. Had called and asked to speak with Marlin. I answered the call and pt. Asked me if I was the one who had left a message about insurance approval for his procedure. I explained to the pt. That I do no work with getting approvals, so I was not sure of what message he was referring to. Pt. Then asked if he had heard correctly that the office would no longer be accepting his insurance in January. I explained that as of January 1st the office would no longer be accepting [facility] health insurance. Pt. Stated that he does have an appointment scheduled for next week and wanted to make sure his insurance would be accepted for that visit, which I told the pt yes it would be covered.  Pt. Stated understanding and stated that he will speak with Dr. Cantrell at his next appointment.

## 2024-12-09 ENCOUNTER — PHARMACY VISIT (OUTPATIENT)
Dept: PHARMACY | Facility: CLINIC | Age: 59
End: 2024-12-09
Payer: MEDICAID

## 2024-12-10 ENCOUNTER — PHARMACY VISIT (OUTPATIENT)
Dept: PHARMACY | Facility: CLINIC | Age: 59
End: 2024-12-10
Payer: MEDICAID

## 2024-12-10 ENCOUNTER — OFFICE VISIT (OUTPATIENT)
Dept: PRIMARY CARE | Facility: CLINIC | Age: 59
End: 2024-12-10
Payer: COMMERCIAL

## 2024-12-10 VITALS
SYSTOLIC BLOOD PRESSURE: 148 MMHG | DIASTOLIC BLOOD PRESSURE: 88 MMHG | HEART RATE: 100 BPM | BODY MASS INDEX: 34.03 KG/M2 | WEIGHT: 244 LBS | OXYGEN SATURATION: 97 % | RESPIRATION RATE: 18 BRPM

## 2024-12-10 DIAGNOSIS — F41.9 ANXIETY: ICD-10-CM

## 2024-12-10 DIAGNOSIS — Z23 ENCOUNTER FOR IMMUNIZATION: ICD-10-CM

## 2024-12-10 DIAGNOSIS — Z00.00 ROUTINE ADULT HEALTH MAINTENANCE: Primary | ICD-10-CM

## 2024-12-10 DIAGNOSIS — L30.4 INTERTRIGO: ICD-10-CM

## 2024-12-10 DIAGNOSIS — E89.0 POSTOPERATIVE HYPOTHYROIDISM: ICD-10-CM

## 2024-12-10 DIAGNOSIS — M48.061 SPINAL STENOSIS OF LUMBAR REGION WITHOUT NEUROGENIC CLAUDICATION: ICD-10-CM

## 2024-12-10 DIAGNOSIS — E03.9 ACQUIRED HYPOTHYROIDISM: ICD-10-CM

## 2024-12-10 DIAGNOSIS — I10 PRIMARY HYPERTENSION: ICD-10-CM

## 2024-12-10 DIAGNOSIS — E78.49 OTHER HYPERLIPIDEMIA: ICD-10-CM

## 2024-12-10 DIAGNOSIS — R06.6 INTRACTABLE HICCUPS: ICD-10-CM

## 2024-12-10 PROBLEM — F10.10 ETOH ABUSE: Status: RESOLVED | Noted: 2023-09-04 | Resolved: 2024-12-10

## 2024-12-10 PROBLEM — F17.200 TOBACCO DEPENDENCE SYNDROME: Status: RESOLVED | Noted: 2023-09-04 | Resolved: 2024-12-10

## 2024-12-10 PROCEDURE — 3077F SYST BP >= 140 MM HG: CPT | Performed by: FAMILY MEDICINE

## 2024-12-10 PROCEDURE — 90471 IMMUNIZATION ADMIN: CPT | Performed by: FAMILY MEDICINE

## 2024-12-10 PROCEDURE — 1036F TOBACCO NON-USER: CPT | Performed by: FAMILY MEDICINE

## 2024-12-10 PROCEDURE — 99396 PREV VISIT EST AGE 40-64: CPT | Performed by: FAMILY MEDICINE

## 2024-12-10 PROCEDURE — 3079F DIAST BP 80-89 MM HG: CPT | Performed by: FAMILY MEDICINE

## 2024-12-10 PROCEDURE — RXMED WILLOW AMBULATORY MEDICATION CHARGE

## 2024-12-10 RX ORDER — LEVOTHYROXINE SODIUM 150 UG/1
150 TABLET ORAL SEE ADMIN INSTRUCTIONS
Qty: 96 TABLET | Refills: 1 | Status: SHIPPED | OUTPATIENT
Start: 2024-12-10

## 2024-12-10 RX ORDER — BACLOFEN 5 MG/1
5 TABLET ORAL 3 TIMES DAILY
Qty: 30 TABLET | Refills: 2 | Status: SHIPPED | OUTPATIENT
Start: 2024-12-10

## 2024-12-10 RX ORDER — NYSTATIN 100000 [USP'U]/G
1 POWDER TOPICAL 2 TIMES DAILY
Qty: 60 G | Refills: 1 | Status: SHIPPED | OUTPATIENT
Start: 2024-12-10 | End: 2025-12-10

## 2024-12-10 ASSESSMENT — PATIENT HEALTH QUESTIONNAIRE - PHQ9
10. IF YOU CHECKED OFF ANY PROBLEMS, HOW DIFFICULT HAVE THESE PROBLEMS MADE IT FOR YOU TO DO YOUR WORK, TAKE CARE OF THINGS AT HOME, OR GET ALONG WITH OTHER PEOPLE: SOMEWHAT DIFFICULT
2. FEELING DOWN, DEPRESSED OR HOPELESS: NEARLY EVERY DAY
7. TROUBLE CONCENTRATING ON THINGS, SUCH AS READING THE NEWSPAPER OR WATCHING TELEVISION: NOT AT ALL
4. FEELING TIRED OR HAVING LITTLE ENERGY: NEARLY EVERY DAY
SUM OF ALL RESPONSES TO PHQ QUESTIONS 1-9: 14
8. MOVING OR SPEAKING SO SLOWLY THAT OTHER PEOPLE COULD HAVE NOTICED. OR THE OPPOSITE, BEING SO FIGETY OR RESTLESS THAT YOU HAVE BEEN MOVING AROUND A LOT MORE THAN USUAL: NOT AT ALL
1. LITTLE INTEREST OR PLEASURE IN DOING THINGS: NEARLY EVERY DAY
5. POOR APPETITE OR OVEREATING: SEVERAL DAYS
9. THOUGHTS THAT YOU WOULD BE BETTER OFF DEAD, OR OF HURTING YOURSELF: SEVERAL DAYS
6. FEELING BAD ABOUT YOURSELF - OR THAT YOU ARE A FAILURE OR HAVE LET YOURSELF OR YOUR FAMILY DOWN: NOT AT ALL
3. TROUBLE FALLING OR STAYING ASLEEP: NEARLY EVERY DAY
SUM OF ALL RESPONSES TO PHQ9 QUESTIONS 1 & 2: 6

## 2024-12-10 ASSESSMENT — ENCOUNTER SYMPTOMS
OCCASIONAL FEELINGS OF UNSTEADINESS: 0
DEPRESSION: 0
LOSS OF SENSATION IN FEET: 0

## 2024-12-10 ASSESSMENT — PAIN SCALES - GENERAL: PAINLEVEL_OUTOF10: 8

## 2024-12-10 ASSESSMENT — COLUMBIA-SUICIDE SEVERITY RATING SCALE - C-SSRS
6. HAVE YOU EVER DONE ANYTHING, STARTED TO DO ANYTHING, OR PREPARED TO DO ANYTHING TO END YOUR LIFE?: NO
1. IN THE PAST MONTH, HAVE YOU WISHED YOU WERE DEAD OR WISHED YOU COULD GO TO SLEEP AND NOT WAKE UP?: YES
2. HAVE YOU ACTUALLY HAD ANY THOUGHTS OF KILLING YOURSELF?: NO

## 2024-12-10 NOTE — ASSESSMENT & PLAN NOTE
- Blood pressure stable in office today to which we will continue on current regimen without modification

## 2024-12-10 NOTE — PATIENT INSTRUCTIONS
Problem List Items Addressed This Visit             ICD-10-CM    Anxiety F41.9     - Stable on current regimen         Hypertension I10     - Blood pressure stable in office today to which we will continue on current regimen without modification         HLD (hyperlipidemia) E78.5     - Cholesterol levels moderately elevated with blood work completed in September to which we will continue to focus on healthy, low-fat diet         Spinal stenosis of lumbar region without neurogenic claudication M48.061     - Continue with pain management follow-up per protocol         Relevant Medications    baclofen (Lioresal) 5 mg tablet    Postoperative hypothyroidism E89.0     - Thyroid levels stable on blood work completed in September          Other Visit Diagnoses         Codes    Routine adult health maintenance    -  Primary Z00.00    Relevant Orders    Zoster vaccine, recombinant, adult (SHINGRIX)    Encounter for immunization     Z23    Relevant Orders    Zoster vaccine, recombinant, adult (SHINGRIX)    Intertrigo     L30.4    Relevant Medications    nystatin (Mycostatin) 100,000 unit/gram powder    Acquired hypothyroidism     E03.9    Relevant Medications    levothyroxine (Synthroid) 150 mcg tablet    Intractable hiccups     R06.6    Relevant Medications    baclofen (Lioresal) 5 mg tablet            Additional Visit Plans:  - Complete history and physical examination was performed    GENERAL RECOMMENDATIONS:  - Complete review of history of physical exam completed today  - A healthy diet to maintain a normal BMI (under 25) to reduce heart disease, risk for diabetes encouraged.  - Exercising 150 minutes per week and eating healthy to reduce heart disease.  - Blood pressure screen completed.    BLOOD TESTING:  -Routine blood work successfully completed in September    General Recommendations include:  - Cholesterol and diabetes screen if risk factors (overweight, high blood pressure).  - Sexually transmitted infections if  risk factors.  - Hepatitis C virus screen for all adults -completed and negative    VACCINATIONS RECOMMENDATIONS:  - Flu shot annually - up-to-date  - Tetanus booster every 10 years - advocated  - Pneumonia vaccination starting at 65 years old (or earlier if risk factors - smoker, diabetic, heart or lung conditions) -up-to-date  - Shingles vaccine for those 50 years or older - check with your insurance for SHINGRIX coverage and get it at your local pharmacy -initiated today  -COVID-19 vaccine series completed with patient currently due for booster    SCREENINGS RECOMMENDATIONS:  -Colon cancer screening (with colonoscopy or Cologuard) for men and women starting at age 45 until 74 years old - up-to-date    Counseling:       Medication education:         Education:  The patient is counseled regarding potential side-effects of all new medications        Understanding:  Patient expressed understanding        Adherence:  No barriers to adherence identified      ** Please excuse any errors in grammar or translation related to this dictation. Voice recognition software was utilized to prepare this document. **

## 2024-12-10 NOTE — PROGRESS NOTES
Outpatient Visit Note    Chief Complaint   Patient presents with    Annual Exam       HPI:  Todd Small is a 59 y.o. male who presents to the office for annual well exam.  He was last seen in the office in September 2024 for follow-up    Patient has a complex past medical history significant for CHF, nonischemic cardiomyopathy, hypertension, anxiety and postprocedural hypothyroidism.    In review, patient was seen in February to which he reported persistent and progressive urinary discomfort with focal irritation at penile meatus for approximately 2 weeks.  Denied any discharge.  States that skin around meatus has been erythematous intermittently.  Notes urinary frequency/urgency.  Denies any urinary odor, hematuria or visible urine changes.  States you have been started on Farxiga in the interval.    Urinalysis was prominent for glucosuria likely exacerbated by Farxiga.  Symptoms seem to be consistent with mycotic infection to which he was placed on Diflucan regimen.  Topical antifungal was also initiated.  He did experience symptom improvement March follow-up.    At last encounter he reported resolved irritation in his inguinal creases. Had intermittent struggles with erectile dysfunction which he does express interest in trial of sildenafil, to which prescription was given    Last panel blood work completed in September which was generally unremarkable outside of moderate hyperlipidemia    Well Exam:  Overall, they describe their health as ___ with no reports of recent illness or hospitalization. They state that their diet is ___. In regards to physical activity, ___. Denies any significant sleep complaints. Denies issues of chest pain, shortness of breath, headaches, vision/hearing changes, abdominal pain, vomiting, diarrhea, melena, hematochezia, constipation or urinary symptoms.    Preventative Health Maintenance:  In regards to preventative health maintenance, last Tdap received unknown. Flu shot  Data- discharge order received, pt verbalized agreement to discharge, disposition to previous residence, no needs for HHC/DME.     Action- discharge instructions prepared and given to pt, pt and wife verbalized understanding. Medication information packet given r/t NEW and/or CHANGED prescriptions emphasizing name/purpose/side effects, pt verbalized understanding. Discharge instruction summary: Diet- cardiac, Activity- restrictions following procedure discussed with pt and added onto AVS, Primary Care Physician as follows: Camilla Rios -[phone] f/u appointment in 1 week, go to your scheduled appointment, immunizations reviewed, prescription medications filled at [facility]. Inpatient surgical procedure precautions reviewed and added on AVS.     1. WEIGHT: Admit Weight - Scale: 106.1 kg (234 lb) (04/01/24 1117)        Today  Weight - Scale: 100.5 kg (221 lb 8 oz) (04/03/24 0502)       2. O2 SAT.: SpO2: 97 % (04/03/24 1115)    Response- Pt belongings gathered, IV removed. Disposition is home (no HHC/DME needs), transported with wife, taken to lobby via w/c w/ RN, no complications.     received for this season. Pneumonia vaccination series up-to-date with Prevnar 20. In regards to CRC screening, colonoscopy successfully completed in 2020. Hepatitis C screening previously completed and negative. Shingles vaccination series not pursued to-date, with patient requesting to start series today.  COVID-19 vaccine series completed with patient currently due for booster.        Current Medications  Current Outpatient Medications   Medication Instructions    ALPRAZolam (Xanax) 1 mg tablet Take 1 tablet by mouth in the morning, 1 tablet at midday, and 1-2 tablets at bedtime as needed    amLODIPine (NORVASC) 5 mg, oral, Daily    baclofen (LIORESAL) 5 mg, oral, 3 times daily    carvedilol (Coreg) 12.5 mg tablet TAKE 1 TABLET BY MOUTH TWO TIMES A DAY WITH FOOD    furosemide (Lasix) 40 mg tablet TAKE ONE TABLET BY MOUTH ONCE A DAY    levothyroxine (Synthroid) 150 mcg tablet Take 1 tablet (150 mcg) by mouth on MONDAY through SATURDAY, and take 1 AND 1/2 tablets on SUNDAY as directed    lidocaine (Aspercreme, lidocaine,) 4 % patch Unwrap and apply 1 patch every 12 hours if needed (low back pain). Remove & discard patch within 12 hours or as directed by MD.    naloxone (NARCAN) 4 mg, nasal, As needed, May repeat every 2-3 minutes if needed, alternating nostrils, until medical assistance becomes available.    nystatin (Mycostatin) 100,000 unit/gram powder 1 Application, Topical, 2 times daily    oxyCODONE-acetaminophen (Percocet) 7.5-325 mg tablet Take 1 tablet by mouth 4 times a day as needed for severe pain (7 - 10). Do not fill before November 13, 2024.    potassium chloride CR (Klor-Con) 10 mEq ER tablet 10 mEq, oral, 2 times daily    sacubitriL-valsartan (Entresto)  mg tablet 1 tablet, oral, 2 times daily    sildenafil (Revatio) 20 mg tablet Take 1 tablet (20 mg) by mouth 30-45 minutes prior to sexual activity. Do not exceed 100mg in a 24 hour period    venlafaxine XR (Effexor-XR) 150 mg 24 hr capsule  Take 1 tablet by mouth once a day (take with 75mg capsule for a total of 225mg daily)    venlafaxine XR (Effexor-XR) 75 mg 24 hr capsule Take one capsule by mouth once a day (take with 150mg capsule for a total of 225mg daily)        Allergies  Allergies   Allergen Reactions    Latex Rash    Sulfa (Sulfonamide Antibiotics) Rash and Unknown        Immunizations  Immunization History   Administered Date(s) Administered    Flu vaccine (IIV4), preservative free *Check age/dose* 2023, 10/17/2023    Flu vaccine, trivalent, preservative free, no egg protein, age 18y+ (Flublok) 2024    Influenza, Unspecified 10/27/2010    Influenza, injectable, quadrivalent 2023    Influenza, seasonal, injectable 10/27/2010    Moderna SARS-CoV-2 Vaccination 2021, 2021, 2021    Novel influenza-H1N1-09, preservative-free 2009    Pneumococcal conjugate vaccine, 20-valent (PREVNAR 20) 2023        Past Medical History:   Diagnosis Date    Anxiety 23    Back pain     Cancer (Multi) 10/01/0-    CHF (congestive heart failure) 10/01/23    Depression 10/01/23    Extremity pain     Headache     Hypothyroidism     Low back pain     Thyroid cancer (Multi)       Past Surgical History:   Procedure Laterality Date    APPENDECTOMY  10/01/96    CARDIAC CATHETERIZATION      COLONOSCOPY  2020    HERNIA REPAIR      THYROIDECTOMY      VASECTOMY       Family History   Problem Relation Name Age of Onset    Heart disease Mother Kim     Heart disease Sister (1)     Other (pacemaker) Sister (1)     Heart disease Brother Skye     Atrial fibrillation Brother Gilberto      Social History     Tobacco Use    Smoking status: Former     Current packs/day: 0.00     Average packs/day: 1 pack/day for 15.0 years (15.0 ttl pk-yrs)     Types: Cigarettes     Start date: 2008     Quit date: 2023     Years since quittin.4    Smokeless tobacco: Never   Vaping Use    Vaping status: Never Used   Substance Use Topics     Alcohol use: Not Currently    Drug use: Never       ROS  All pertinent positive symptoms are included in the history of present illness.  All other systems have been reviewed and are negative and noncontributory to this patient's current ailments.    VITAL SIGNS  Vitals:    12/10/24 0813   BP: 148/88   Pulse: 100   Resp: 18   SpO2: 97%       PHYSICAL EXAM  GENERAL APPEARANCE: alert and oriented, Pleasant and cooperative, No Acute Distress.   HEENT: EOMI, PERRLA, TMs intact and flat bilaterally, patent nares, normal oropharynx, MMM  NECK: no lymphadenopathy, no thyromegaly.   HEART: RRR, normal S1S2, no murmurs, click or rubs.   LUNGS: clear to auscultation bilaterally, no wheezes/rhonchi/rales.   ABDOMEN: soft, non-tender, no organomegaly, no masses palpated, no guarding or rigidity.   EXTREMITIES: no edema, normal ROM  SKIN: normal, no rash, unremarkable.   NEUROLOGIC EXAM: non-focal exam.   MUSCULOSKELETAL: no gross abnormalities.   PSYCH: affect is normal, eye contact is good.     Assessment/Plan   Problem List Items Addressed This Visit             ICD-10-CM    Anxiety F41.9     - Stable on current regimen         Hypertension I10     - Blood pressure stable in office today to which we will continue on current regimen without modification         HLD (hyperlipidemia) E78.5     - Cholesterol levels moderately elevated with blood work completed in September to which we will continue to focus on healthy, low-fat diet         Spinal stenosis of lumbar region without neurogenic claudication M48.061     - Continue with pain management follow-up per protocol         Relevant Medications    baclofen (Lioresal) 5 mg tablet    Postoperative hypothyroidism E89.0     - Thyroid levels stable on blood work completed in September          Other Visit Diagnoses         Codes    Routine adult health maintenance    -  Primary Z00.00    Relevant Orders    Zoster vaccine, recombinant, adult (SHINGRIX)    Encounter for  immunization     Z23    Relevant Orders    Zoster vaccine, recombinant, adult (SHINGRIX)    Intertrigo     L30.4    Relevant Medications    nystatin (Mycostatin) 100,000 unit/gram powder    Acquired hypothyroidism     E03.9    Relevant Medications    levothyroxine (Synthroid) 150 mcg tablet    Intractable hiccups     R06.6    Relevant Medications    baclofen (Lioresal) 5 mg tablet            Additional Visit Plans:  - Complete history and physical examination was performed    GENERAL RECOMMENDATIONS:  - Complete review of history of physical exam completed today  - A healthy diet to maintain a normal BMI (under 25) to reduce heart disease, risk for diabetes encouraged.  - Exercising 150 minutes per week and eating healthy to reduce heart disease.  - Blood pressure screen completed.    BLOOD TESTING:  -Routine blood work successfully completed in September    General Recommendations include:  - Cholesterol and diabetes screen if risk factors (overweight, high blood pressure).  - Sexually transmitted infections if risk factors.  - Hepatitis C virus screen for all adults -completed and negative    VACCINATIONS RECOMMENDATIONS:  - Flu shot annually - up-to-date  - Tetanus booster every 10 years - advocated  - Pneumonia vaccination starting at 65 years old (or earlier if risk factors - smoker, diabetic, heart or lung conditions) -up-to-date  - Shingles vaccine for those 50 years or older - check with your insurance for SHINGRIX coverage and get it at your local pharmacy -initiated today  -COVID-19 vaccine series completed with patient currently due for booster    SCREENINGS RECOMMENDATIONS:  -Colon cancer screening (with colonoscopy or Cologuard) for men and women starting at age 45 until 74 years old - up-to-date    Counseling:       Medication education:         Education:  The patient is counseled regarding potential side-effects of all new medications        Understanding:  Patient expressed understanding         Adherence:  No barriers to adherence identified      ** Please excuse any errors in grammar or translation related to this dictation. Voice recognition software was utilized to prepare this document. **

## 2024-12-10 NOTE — ASSESSMENT & PLAN NOTE
- Cholesterol levels moderately elevated with blood work completed in September to which we will continue to focus on healthy, low-fat diet

## 2024-12-10 NOTE — ASSESSMENT & PLAN NOTE
- Secondary to recurrent groin irritation, recommend use of antifungal powder as needed along with good hygiene practices

## 2024-12-11 ENCOUNTER — OFFICE VISIT (OUTPATIENT)
Dept: PAIN MEDICINE | Facility: CLINIC | Age: 59
End: 2024-12-11
Payer: COMMERCIAL

## 2024-12-11 VITALS — SYSTOLIC BLOOD PRESSURE: 126 MMHG | OXYGEN SATURATION: 96 % | DIASTOLIC BLOOD PRESSURE: 80 MMHG | HEART RATE: 96 BPM

## 2024-12-11 DIAGNOSIS — Z79.891 LONG TERM CURRENT USE OF OPIATE ANALGESIC: ICD-10-CM

## 2024-12-11 DIAGNOSIS — M46.1 SACROILIITIS (CMS-HCC): ICD-10-CM

## 2024-12-11 DIAGNOSIS — M54.16 LUMBAR RADICULOPATHY: ICD-10-CM

## 2024-12-11 DIAGNOSIS — M48.061 SPINAL STENOSIS OF LUMBAR REGION WITHOUT NEUROGENIC CLAUDICATION: Primary | ICD-10-CM

## 2024-12-11 PROCEDURE — 99214 OFFICE O/P EST MOD 30 MIN: CPT | Performed by: ANESTHESIOLOGY

## 2024-12-11 PROCEDURE — 3079F DIAST BP 80-89 MM HG: CPT | Performed by: ANESTHESIOLOGY

## 2024-12-11 PROCEDURE — 1036F TOBACCO NON-USER: CPT | Performed by: ANESTHESIOLOGY

## 2024-12-11 PROCEDURE — 3074F SYST BP LT 130 MM HG: CPT | Performed by: ANESTHESIOLOGY

## 2024-12-11 RX ORDER — OXYCODONE AND ACETAMINOPHEN 7.5; 325 MG/1; MG/1
1 TABLET ORAL 4 TIMES DAILY PRN
Qty: 120 TABLET | Refills: 0 | Status: SHIPPED | OUTPATIENT
Start: 2024-12-13 | End: 2025-01-12

## 2024-12-11 ASSESSMENT — PAIN - FUNCTIONAL ASSESSMENT: PAIN_FUNCTIONAL_ASSESSMENT: 0-10

## 2024-12-11 ASSESSMENT — ENCOUNTER SYMPTOMS
LOSS OF SENSATION IN FEET: 0
EYES NEGATIVE: 1
CONSTITUTIONAL NEGATIVE: 1
CARDIOVASCULAR NEGATIVE: 1
OCCASIONAL FEELINGS OF UNSTEADINESS: 0
WEAKNESS: 1
BACK PAIN: 1
RESPIRATORY NEGATIVE: 1
GASTROINTESTINAL NEGATIVE: 1
NUMBNESS: 1
PSYCHIATRIC NEGATIVE: 1
HEMATOLOGIC/LYMPHATIC NEGATIVE: 1
DEPRESSION: 0
ENDOCRINE NEGATIVE: 1

## 2024-12-11 ASSESSMENT — PATIENT HEALTH QUESTIONNAIRE - PHQ9
9. THOUGHTS THAT YOU WOULD BE BETTER OFF DEAD, OR OF HURTING YOURSELF: NOT AT ALL
5. POOR APPETITE OR OVEREATING: NOT AT ALL
2. FEELING DOWN, DEPRESSED OR HOPELESS: NOT AT ALL
SUM OF ALL RESPONSES TO PHQ QUESTIONS 1-9: 0
10. IF YOU CHECKED OFF ANY PROBLEMS, HOW DIFFICULT HAVE THESE PROBLEMS MADE IT FOR YOU TO DO YOUR WORK, TAKE CARE OF THINGS AT HOME, OR GET ALONG WITH OTHER PEOPLE: NOT DIFFICULT AT ALL
SUM OF ALL RESPONSES TO PHQ9 QUESTIONS 1 AND 2: 0
7. TROUBLE CONCENTRATING ON THINGS, SUCH AS READING THE NEWSPAPER OR WATCHING TELEVISION: NOT AT ALL
1. LITTLE INTEREST OR PLEASURE IN DOING THINGS: NOT AT ALL
3. TROUBLE FALLING OR STAYING ASLEEP OR SLEEPING TOO MUCH: NOT AT ALL
4. FEELING TIRED OR HAVING LITTLE ENERGY: NOT AT ALL
6. FEELING BAD ABOUT YOURSELF - OR THAT YOU ARE A FAILURE OR HAVE LET YOURSELF OR YOUR FAMILY DOWN: NOT AT ALL
8. MOVING OR SPEAKING SO SLOWLY THAT OTHER PEOPLE COULD HAVE NOTICED. OR THE OPPOSITE, BEING SO FIGETY OR RESTLESS THAT YOU HAVE BEEN MOVING AROUND A LOT MORE THAN USUAL: NOT AT ALL

## 2024-12-11 ASSESSMENT — PAIN DESCRIPTION - DESCRIPTORS: DESCRIPTORS: ACHING

## 2024-12-11 ASSESSMENT — PAIN SCALES - GENERAL
PAINLEVEL_OUTOF10: 10 - WORST POSSIBLE PAIN
PAINLEVEL_OUTOF10: 10-WORST PAIN EVER

## 2024-12-11 NOTE — PROGRESS NOTES
MEDICATION NAME: percocet  STRENGTH: 7.5-325 mg  LAST FILL DATE: 2024  DATE LAST TAKEN: 2024  QUANTITY FILLED: 120  QUANTITY REMAININ  COUNT COMPLETED BY: CARLOS SOSA  and BECKIE HARTLEY LPN      UDS LAST COMPLETED:   CONTROLLED SUBSTANCES AGREEMENT LAST SIGNED:   ORT LAST COMPLETED:  Modified Oswestry disability form filled out annually.

## 2024-12-11 NOTE — PROGRESS NOTES
PAIN MANAGEMENT FOLLOW-UP OFFICE NOTE    Date of Service: 12/11/2024    SUBJECTIVE    CHIEF COMPLAINT: LBP    HISTORY OF PRESENT ILLNESS    Todd Small is a 59 y.o. male with PMH CHF 2/2 former EtOH use, former smoker, CKD, MDD, papillary thyroid ca s/p thyroidectomy/RT with hypothyroidism who presents for F/U    On 11/12, underwent BL L3-4 TFESI with 70% ongoing relief. Complains of pain along belt line. Taking Percocet 7.5 mg QID PRN with good relief. Denies SE> Requests RF.     Pt denies new-onset bowel/bladder incontinence.  Pt denies recent infection, allergy to Latex/iodine/contrast. Patient is currently taking the following blood thinner(s): N/A    Procedure log:  -BL L3-4 TFESI 11/12/24: 70% ongoing relief  -Dx BL SIJ injxn #1 10/29/24: 100% relief 3 d  -BL SIJ  CSI 9/3/24: 100% relief 3 hours before return to baseline   -BL L3-4 TFESI 7/30/24: 50% relief in his legs, but 30% relief in LB      REVIEW OF SYSTEMS  Review of Systems   Constitutional: Negative.    HENT: Negative.     Eyes: Negative.    Respiratory: Negative.     Cardiovascular: Negative.    Gastrointestinal: Negative.    Endocrine: Negative.    Musculoskeletal:  Positive for back pain.   Skin: Negative.    Neurological:  Positive for weakness and numbness.   Hematological: Negative.    Psychiatric/Behavioral: Negative.         PAST MEDICAL HISTORY  Past Medical History:   Diagnosis Date    Anxiety 4/01/23    Back pain     Cancer (Multi) 10/01/0-    CHF (congestive heart failure) 10/01/23    Depression 10/01/23    Extremity pain     Headache     Hypothyroidism     Low back pain     Thyroid cancer (Multi)      Past Surgical History:   Procedure Laterality Date    APPENDECTOMY  10/01/96    CARDIAC CATHETERIZATION      COLONOSCOPY  09/11/2020    HERNIA REPAIR      THYROIDECTOMY      VASECTOMY       Family History   Problem Relation Name Age of Onset    Heart disease Mother Kim     Heart disease Sister (1)     Other (pacemaker) Sister (1)      Heart disease Brother Skye     Atrial fibrillation Brother Gilberto        CURRENT MEDICATIONS  Current Outpatient Medications   Medication Sig Dispense Refill    ALPRAZolam (Xanax) 1 mg tablet Take 1 tablet by mouth in the morning, 1 tablet at midday, and 1-2 tablets at bedtime as needed 360 tablet 0    amLODIPine (Norvasc) 5 mg tablet Take 1 tablet (5 mg) by mouth once daily. 90 tablet 3    baclofen (Lioresal) 5 mg tablet Take 1 tablet (5 mg) by mouth 3 times a day. 30 tablet 2    carvedilol (Coreg) 12.5 mg tablet TAKE 1 TABLET BY MOUTH TWO TIMES A DAY WITH FOOD 180 tablet 3    furosemide (Lasix) 40 mg tablet TAKE ONE TABLET BY MOUTH ONCE A DAY 90 tablet 3    levothyroxine (Synthroid) 150 mcg tablet Take 1 tablet (150 mcg) by mouth on MONDAY through SATURDAY, and take 1 AND 1/2 tablets on SUNDAY as directed 96 tablet 1    lidocaine (Aspercreme, lidocaine,) 4 % patch Unwrap and apply 1 patch every 12 hours if needed (low back pain). Remove & discard patch within 12 hours or as directed by MD. 30 patch 2    naloxone (Narcan) 4 mg/0.1 mL nasal spray Administer 1 spray (4 mg) into affected nostril(s) if needed for opioid reversal. May repeat every 2-3 minutes if needed, alternating nostrils, until medical assistance becomes available. 2 each 0    nystatin (Mycostatin) 100,000 unit/gram powder Apply 1 Application topically 2 times a day. 60 g 1    oxyCODONE-acetaminophen (Percocet) 7.5-325 mg tablet Take 1 tablet by mouth 4 times a day as needed for severe pain (7 - 10). Do not fill before November 13, 2024. 120 tablet 0    potassium chloride CR (Klor-Con) 10 mEq ER tablet Take 1 tablet (10 mEq) by mouth 2 times a day. 180 tablet 3    sacubitriL-valsartan (Entresto)  mg tablet Take 1 tablet by mouth 2 times a day. 180 tablet 3    sildenafil (Revatio) 20 mg tablet Take 1 tablet (20 mg) by mouth 30-45 minutes prior to sexual activity. Do not exceed 100mg in a 24 hour period 12 tablet 2    venlafaxine XR (Effexor-XR)  150 mg 24 hr capsule Take 1 tablet by mouth once a day (take with 75mg capsule for a total of 225mg daily) 90 capsule 0    venlafaxine XR (Effexor-XR) 75 mg 24 hr capsule Take one capsule by mouth once a day (take with 150mg capsule for a total of 225mg daily) 90 capsule 0     No current facility-administered medications for this visit.       ALLERGIES AND DRUG REACTIONS  Allergies   Allergen Reactions    Latex Rash    Sulfa (Sulfonamide Antibiotics) Rash and Unknown          OBJECTIVE  Visit Vitals  /80   Pulse 96   SpO2 96%   Smoking Status Former       Last Recorded Pain Score (if available):                Physical Exam  General: Sitting in chair, NAD, antalgic gait with cane and hunched forward  Head: NCAT  Eyes: Sclera/conjunctiva clear, EOMI, PERRL  Nose/mouth: MMM  CV: Good distal pulses  Lungs: Good/equal chest excursion  Abdomen: Soft, ND  Ext: No cyanosis/edema  MSK: L-spine alignment: unremarkable, BL paraspinal m/BL PSIS TTP, L-spine ROM: ext limited by pain  +Jaspreet BL, JEANINE BL, thigh thrust BL, [place] BL    Neuro: AAOx3, CN grossly nl   Dermatome sensation to light touch  LEFT L1 (lower pelvis/upper thigh): WNL    RIGHT L1: WNL      LEFT L2 (upper thigh): WNL       RIGHT: L2: decreased      LEFT L3 (medial knee): WNL       RIGHT L3: decreased      LEFT L4 (superior medial malleolus): WNL       RIGHT L4: WNL      LEFT L5 (dorsal foot): WNL       RIGHT L5: WNL      LEFT S1 (lateral foot): WNL     RIGHT S1: WNL      LEFT S2 (popliteal fossa): WNL    RIGHT S2: WNL        Motor strength (with coaching)  LEFT L2 (hip flexion): 5/5   RIGHT L2: 4+/5  LEFT L3 (knee extension): 5/5     RIGHT L3: 5/5  LEFT L4 (dorsiflexion): 5/5     RIGHT L4: 5/5  LEFT L5 (EHL extension): 5/5     RIGHT L5: 5/5  LEFT S1 (plantarflexion): 5/5     RIGHT S1: 5/5  LEFT S2 (knee flexion): 5/5      RIGHT S2: 5/5        Psych: affect nl, affect nl  Skin: no rash      REVIEW OF LABORATORY DATA  I have reviewed the following lab  "results:  WBC   Date Value Ref Range Status   09/20/2024 7.6 4.4 - 11.3 x10*3/uL Final     RBC   Date Value Ref Range Status   09/20/2024 4.06 (L) 4.50 - 5.90 x10*6/uL Final     Hemoglobin   Date Value Ref Range Status   09/20/2024 12.4 (L) 13.5 - 17.5 g/dL Final     Hematocrit   Date Value Ref Range Status   09/20/2024 38.9 (L) 41.0 - 52.0 % Final     MCV   Date Value Ref Range Status   09/20/2024 96 80 - 100 fL Final     MCH   Date Value Ref Range Status   09/20/2024 30.5 26.0 - 34.0 pg Final     MCHC   Date Value Ref Range Status   09/20/2024 31.9 (L) 32.0 - 36.0 g/dL Final     RDW   Date Value Ref Range Status   09/20/2024 13.3 11.5 - 14.5 % Final     Platelets   Date Value Ref Range Status   09/20/2024 222 150 - 450 x10*3/uL Final     MPV   Date Value Ref Range Status   11/22/2022 9.4 7.0 - 12.6 CU Final     Sodium   Date Value Ref Range Status   09/20/2024 136 136 - 145 mmol/L Final     Potassium   Date Value Ref Range Status   09/20/2024 4.9 3.5 - 5.3 mmol/L Final     Bicarbonate   Date Value Ref Range Status   09/20/2024 31 21 - 32 mmol/L Final     Urea Nitrogen   Date Value Ref Range Status   09/20/2024 17 6 - 23 mg/dL Final     Calcium   Date Value Ref Range Status   09/20/2024 8.8 8.6 - 10.3 mg/dL Final     No results found for: \"PROTIME\", \"PTT\", \"INR\", \"FIBRINOGEN\"      REVIEW OF RADIOLOGY   I have reviewed the following:  Radiology Studies           MRI L-spine 7/3/24:         ASSESSMENT & PLAN  Todd Small is a 59 y.o. male with PMH CHF 2/2 former EtOH use, former smoker, CKD, MDD, papillary thyroid ca s/p thyroidectomy/RT with hypothyroidism who presents  for F/U    1) Lumbar spinal stenosis  -LBP radiating down BLE with standing/walking affecting gait, function, ADLs, and QoL  -Refractive to Tylenol, biofreeze, SNRI, Norco  -NSAIDs contraindicated 2/2 CKD. MDP may worsen rash  -Unable to meaningfully participate in PT 2/2 pain severity and late-stage CHF  -MRI L-spine 7/3/24:  multilevel " spondylosis featuring moderate L3-4 stenosis  -BL L3-4 TFESI 11/12/24: 70% ongoing relief    2) Sacroiliitis  -BL SIJ-mediated pain reproduced on multiple SIJ-provocative maneuvers  -Refractive to yrs of conservative tx including Tylenol, biofreeze, Norco  -SIJ fusion work-up  --CT pelvis 10/15/24: mild BL hip OA, mild BL SIJ OA  --Therapeutic BL SIJ CSI 9/3/24: 100% relief 3 h  --Dx BL SIJ injxn #1 10/29/24: 100% relief 3 d  --Schedule diagnostic BL SIJ injxn #2 with LA only to assess candidacy for fusion    3) Long-term opiate use  -2/2 chronic LB and leg pain refractive to conservative, interventional tx  -On Percocet 7.5 mg QID PRN (45 MME) with moderate relief. Denies SE. Requests RF  - reviewed/appropriate. Reviewed DS 10/14/24:: OK. No sign of aberrant behavior.   -Educated on/prescribed Narcan 8/12/24 2/2 benzo co-prescription  -RF Percocet x30 d. Declined dose inc cristian in context of benzo co-use  -F/U 1 mo        Discussed procedure risks/benefits in detail with patient. Pt meets medical necessity for procedure due to failure of conservative measures. Reviewed procedural risks including bleeding, infection, nerve damage, paralysis. Also reviewed mitigating factors such as screening for infection/blood thinner use, sterile precautions, and image-guidance when applicable. All questions answered. Pt/guardian expressed understanding and choose to proceed                 Nathaly Cantrell MD  Anesthesiologist & Interventional Pain Physician   [facility]  O: [phone]  F: [phone]  8:33 AM  12/11/24

## 2024-12-13 ENCOUNTER — PHARMACY VISIT (OUTPATIENT)
Dept: PHARMACY | Facility: CLINIC | Age: 59
End: 2024-12-13
Payer: MEDICAID

## 2024-12-13 PROCEDURE — RXMED WILLOW AMBULATORY MEDICATION CHARGE

## 2024-12-18 ENCOUNTER — TELEPHONE (OUTPATIENT)
Dept: PAIN MEDICINE | Facility: CLINIC | Age: 59
End: 2024-12-18
Payer: COMMERCIAL

## 2024-12-19 ENCOUNTER — TELEPHONE (OUTPATIENT)
Dept: PAIN MEDICINE | Facility: CLINIC | Age: 59
End: 2024-12-19
Payer: COMMERCIAL

## 2024-12-19 PROCEDURE — RXMED WILLOW AMBULATORY MEDICATION CHARGE

## 2024-12-23 ENCOUNTER — PHARMACY VISIT (OUTPATIENT)
Dept: PHARMACY | Facility: CLINIC | Age: 59
End: 2024-12-23
Payer: MEDICAID

## 2024-12-26 ENCOUNTER — TELEPHONE (OUTPATIENT)
Dept: PAIN MEDICINE | Facility: CLINIC | Age: 59
End: 2024-12-26

## 2024-12-26 NOTE — TELEPHONE ENCOUNTER
Pt. Returned call. Pt. Stated that his back is still in pain, but that he will just wait for his appointment on January 7th. Pt. Stated that he was hoping to have his pain medication increased, but would talk with Dr. Cantrell at his appointment in the office on January 8th.

## 2024-12-27 ENCOUNTER — E-VISIT (OUTPATIENT)
Dept: PRIMARY CARE | Facility: CLINIC | Age: 59
End: 2024-12-27
Payer: COMMERCIAL

## 2024-12-27 DIAGNOSIS — N52.9 ERECTILE DYSFUNCTION, UNSPECIFIED ERECTILE DYSFUNCTION TYPE: Primary | ICD-10-CM

## 2024-12-30 RX ORDER — SILDENAFIL 50 MG/1
50 TABLET, FILM COATED ORAL DAILY PRN
Qty: 12 TABLET | Refills: 3 | Status: SHIPPED | OUTPATIENT
Start: 2024-12-30 | End: 2025-12-30

## 2025-01-01 DIAGNOSIS — M54.16 LUMBAR RADICULOPATHY: ICD-10-CM

## 2025-01-01 RX ORDER — OXYCODONE AND ACETAMINOPHEN 7.5; 325 MG/1; MG/1
1 TABLET ORAL 4 TIMES DAILY PRN
Qty: 120 TABLET | Refills: 0 | Status: CANCELLED | OUTPATIENT
Start: 2025-01-01 | End: 2025-01-31

## 2025-01-02 DIAGNOSIS — M54.16 LUMBAR RADICULOPATHY: ICD-10-CM

## 2025-01-02 PROCEDURE — RXMED WILLOW AMBULATORY MEDICATION CHARGE

## 2025-01-02 RX ORDER — OXYCODONE AND ACETAMINOPHEN 7.5; 325 MG/1; MG/1
1 TABLET ORAL 4 TIMES DAILY PRN
Qty: 120 TABLET | Refills: 0 | Status: CANCELLED | OUTPATIENT
Start: 2025-01-01 | End: 2025-01-31

## 2025-01-03 ENCOUNTER — PHARMACY VISIT (OUTPATIENT)
Dept: PHARMACY | Facility: CLINIC | Age: 60
End: 2025-01-03
Payer: MEDICAID

## 2025-01-06 DIAGNOSIS — M54.16 LUMBAR RADICULOPATHY: ICD-10-CM

## 2025-01-06 RX ORDER — OXYCODONE AND ACETAMINOPHEN 7.5; 325 MG/1; MG/1
1 TABLET ORAL 4 TIMES DAILY PRN
Qty: 120 TABLET | Refills: 0 | OUTPATIENT
Start: 2025-01-06 | End: 2025-02-05

## 2025-01-07 ENCOUNTER — ANCILLARY PROCEDURE (OUTPATIENT)
Dept: RADIOLOGY | Facility: EXTERNAL LOCATION | Age: 60
End: 2025-01-07
Payer: COMMERCIAL

## 2025-01-07 DIAGNOSIS — M46.1 SACROILIITIS, NOT ELSEWHERE CLASSIFIED (CMS-HCC): ICD-10-CM

## 2025-01-07 DIAGNOSIS — M54.16 LUMBAR RADICULOPATHY: ICD-10-CM

## 2025-01-07 PROCEDURE — 99152 MOD SED SAME PHYS/QHP 5/>YRS: CPT | Performed by: ANESTHESIOLOGY

## 2025-01-07 PROCEDURE — 27096 INJECT SACROILIAC JOINT: CPT | Performed by: ANESTHESIOLOGY

## 2025-01-07 RX ORDER — OXYCODONE AND ACETAMINOPHEN 7.5; 325 MG/1; MG/1
1 TABLET ORAL 4 TIMES DAILY PRN
Qty: 120 TABLET | Refills: 0 | OUTPATIENT
Start: 2025-01-07 | End: 2025-02-06

## 2025-01-07 NOTE — PROGRESS NOTES
OPERATIVE NOTE  Preprocedure diagnosis: BL SIJ pain  Postprocedure diagnosis BL SIJ pain    Procedure performed: Dx BL SIJ injxn  #2  Physician: Nathaly Cantrell MD  Anesthesia: Minimal IV conscious sedation  Complications: none  Blood loss:  Nil    Clinical note: This is a very pleasant 59 y.o. male with PMH CHF 2/2 former EtOH use, former smoker, CKD, MDD, papillary thyroid ca s/p thyroidectomy/RT with hypothyroidism who suffers with LBP here meeting all medical criteria for above-mentioned procedure. Patient's pain stable and persistent from last visit.  Appropriately NPO.  No personal/family hx issues with anesthesia. Denies allergies to Latex, steroids, local anesthetics, or iodine/contrast. Denies being on blood thinners. Not diabetic.  Denies fever, chills, NS, CP, SOB, cough, N/V.    Discussed procedure risks/benefits in detail with patient. Pt meets medical necessity for procedure due to failure of conservative measures. Reviewed procedural risks including bleeding, infection, nerve damage, paralysis. Also reviewed mitigating factors such as screening for infection/blood thinner use, sterile precautions, and image-guidance when applicable. All questions answered. Pt/guardian expressed understanding and choose to proceed      Procedure:    PROCEDURE: BL Sacroiliac joint injection(s)   Location: [facility]  Informed consent obtained  Time Out Performed: Yes   Proceduralist: Nathaly Cantrell MD   Correct procedure: Confirmed    Correct side/site: Confirmed    Correct patient position: Confirmed    Correct side/site marking visible: Confirmed    Correct X-Rays available:Confirmed    Equipment available: Confirmed    Anesthesia: IV conscious sedation    Diagnosis:  sacroiliitis    Patient Position: prone   Sterile prep with: Chloroprep and draped in the standard fashion   Approach: to the articularjoint from the inferior posterior  margin   Needle(s): #22 G Fangcke, Length: 3.5 inches   Image Guidance:  "Fluoroscopy     Approach: AP contralateral oblique view.  LEFT posterior sacroiliac joint identified.  After local anesthetic with 1% lidocaine using 25 G 1.5\" needle, 22 G spinal needle advanced coaxially into mid joint space.  Needle position confirmed in lateral view to be just anterior to sacral plate.  Anesthetic soln injected.  Pt tolerated without issue.     Procedure was repeated on contralateral side in similar fashion without issue.    Images saved to PACS   Injectate: 1.5 ml 0.5% bupivacaine per side injected     Patient received minimal intravenous sedation/anxiolysis for the procedure today. They exhibited responsiveness to verbal stimulation throughout procedure. Pt provided with supplemental O2 via NC and was monitored with continuous pulse ox/telemetry and continual NIBP.  Patient maintained uninhibited airway, ventilation, and cardiovascular function without issue. See nursing note/flowsheet for more detail      DISCHARGE SUMMARY:   Complications: None   Condition on Discharge: Stable and unchanged from admission   Disposition/Discharge: Home       Nathaly Cantrell MD  Anesthesiologist & Interventional Pain Physician   [facility] [place]  O: [phone]  F: [phone]  8:30 AM  01/07/25          "

## 2025-01-08 ENCOUNTER — OFFICE VISIT (OUTPATIENT)
Dept: PAIN MEDICINE | Facility: CLINIC | Age: 60
End: 2025-01-08
Payer: COMMERCIAL

## 2025-01-08 VITALS
DIASTOLIC BLOOD PRESSURE: 82 MMHG | SYSTOLIC BLOOD PRESSURE: 142 MMHG | OXYGEN SATURATION: 99 % | HEART RATE: 95 BPM | WEIGHT: 244 LBS | HEIGHT: 71 IN | RESPIRATION RATE: 18 BRPM | BODY MASS INDEX: 34.16 KG/M2

## 2025-01-08 DIAGNOSIS — M46.1 SACROILIITIS (CMS-HCC): ICD-10-CM

## 2025-01-08 DIAGNOSIS — M54.16 LUMBAR RADICULOPATHY: ICD-10-CM

## 2025-01-08 DIAGNOSIS — M48.061 SPINAL STENOSIS OF LUMBAR REGION WITHOUT NEUROGENIC CLAUDICATION: Primary | ICD-10-CM

## 2025-01-08 DIAGNOSIS — Z79.891 LONG TERM CURRENT USE OF OPIATE ANALGESIC: ICD-10-CM

## 2025-01-08 PROCEDURE — 1036F TOBACCO NON-USER: CPT | Performed by: ANESTHESIOLOGY

## 2025-01-08 PROCEDURE — 3079F DIAST BP 80-89 MM HG: CPT | Performed by: ANESTHESIOLOGY

## 2025-01-08 PROCEDURE — 80365 DRUG SCREENING OXYCODONE: CPT | Performed by: ANESTHESIOLOGY

## 2025-01-08 PROCEDURE — 99214 OFFICE O/P EST MOD 30 MIN: CPT | Performed by: ANESTHESIOLOGY

## 2025-01-08 PROCEDURE — 3008F BODY MASS INDEX DOCD: CPT | Performed by: ANESTHESIOLOGY

## 2025-01-08 PROCEDURE — 80307 DRUG TEST PRSMV CHEM ANLYZR: CPT | Performed by: ANESTHESIOLOGY

## 2025-01-08 PROCEDURE — 3077F SYST BP >= 140 MM HG: CPT | Performed by: ANESTHESIOLOGY

## 2025-01-08 RX ORDER — OXYCODONE AND ACETAMINOPHEN 7.5; 325 MG/1; MG/1
1 TABLET ORAL 4 TIMES DAILY PRN
Qty: 120 TABLET | Refills: 0 | Status: SHIPPED | OUTPATIENT
Start: 2025-01-11 | End: 2025-02-10

## 2025-01-08 ASSESSMENT — PATIENT HEALTH QUESTIONNAIRE - PHQ9
5. POOR APPETITE OR OVEREATING: MORE THAN HALF THE DAYS
10. IF YOU CHECKED OFF ANY PROBLEMS, HOW DIFFICULT HAVE THESE PROBLEMS MADE IT FOR YOU TO DO YOUR WORK, TAKE CARE OF THINGS AT HOME, OR GET ALONG WITH OTHER PEOPLE: VERY DIFFICULT
1. LITTLE INTEREST OR PLEASURE IN DOING THINGS: MORE THAN HALF THE DAYS
3. TROUBLE FALLING OR STAYING ASLEEP OR SLEEPING TOO MUCH: MORE THAN HALF THE DAYS
SUM OF ALL RESPONSES TO PHQ QUESTIONS 1-9: 11
2. FEELING DOWN, DEPRESSED OR HOPELESS: MORE THAN HALF THE DAYS
SUM OF ALL RESPONSES TO PHQ9 QUESTIONS 1 AND 2: 4
8. MOVING OR SPEAKING SO SLOWLY THAT OTHER PEOPLE COULD HAVE NOTICED. OR THE OPPOSITE, BEING SO FIGETY OR RESTLESS THAT YOU HAVE BEEN MOVING AROUND A LOT MORE THAN USUAL: NOT AT ALL
4. FEELING TIRED OR HAVING LITTLE ENERGY: MORE THAN HALF THE DAYS
6. FEELING BAD ABOUT YOURSELF - OR THAT YOU ARE A FAILURE OR HAVE LET YOURSELF OR YOUR FAMILY DOWN: NOT AT ALL
7. TROUBLE CONCENTRATING ON THINGS, SUCH AS READING THE NEWSPAPER OR WATCHING TELEVISION: SEVERAL DAYS
9. THOUGHTS THAT YOU WOULD BE BETTER OFF DEAD, OR OF HURTING YOURSELF: NOT AT ALL

## 2025-01-08 ASSESSMENT — ENCOUNTER SYMPTOMS
WEAKNESS: 1
NUMBNESS: 1
HEMATOLOGIC/LYMPHATIC NEGATIVE: 1
ENDOCRINE NEGATIVE: 1
BACK PAIN: 1
PSYCHIATRIC NEGATIVE: 1
GASTROINTESTINAL NEGATIVE: 1
CARDIOVASCULAR NEGATIVE: 1
RESPIRATORY NEGATIVE: 1
EYES NEGATIVE: 1
CONSTITUTIONAL NEGATIVE: 1

## 2025-01-08 ASSESSMENT — PAIN DESCRIPTION - DESCRIPTORS: DESCRIPTORS: ACHING;SHARP

## 2025-01-08 ASSESSMENT — PAIN SCALES - GENERAL
PAINLEVEL_OUTOF10: 7
PAINLEVEL_OUTOF10: 7

## 2025-01-08 ASSESSMENT — PAIN - FUNCTIONAL ASSESSMENT: PAIN_FUNCTIONAL_ASSESSMENT: 0-10

## 2025-01-08 NOTE — LETTER
January 8, 2025     Janice Mackey    Patient: Todd Small   YOB: 1965   Date of Visit: 1/8/2025       Dear Janice Mackey:    Plz submit for R SIJ fusion ([id number])       Sincerely,     Nathaly Cantrell MD      CC: No Recipients  ______________________________________________________________________________________    PAIN MANAGEMENT FOLLOW-UP OFFICE NOTE    Date of Service: 1/8/2025    SUBJECTIVE    CHIEF COMPLAINT: LBP    HISTORY OF PRESENT ILLNESS    Todd Small is a 59 y.o. male with PMH CHF 2/2 former EtOH use, former smoker, CKD, MDD, papillary thyroid ca s/p thyroidectomy/RT with hypothyroidism who presents for F/U    On 1/7, pt underwent BL SIJ injxn with 100% relief. He is interested in SIJ fusion.  Taking Percocet 7.5 mg QID PRN with good relief. Denies SE. Requests RF.     Pt denies new-onset bowel/bladder incontinence.  Pt denies recent infection, allergy to Latex/iodine/contrast. Patient is currently taking the following blood thinner(s): N/A    Procedure log:  -Dx BL SIJ injxn #2 1/7/25: 100% relief  -BL L3-4 TFESI 11/12/24: 70% ongoing relief  -Dx BL SIJ injxn #1 10/29/24: 100% relief 3 d  -BL SIJ  CSI 9/3/24: 100% relief 3 hours before return to baseline   -BL L3-4 TFESI 7/30/24: 50% relief in his legs, but 30% relief in LB      REVIEW OF SYSTEMS  Review of Systems   Constitutional: Negative.    HENT: Negative.     Eyes: Negative.    Respiratory: Negative.     Cardiovascular: Negative.    Gastrointestinal: Negative.    Endocrine: Negative.    Musculoskeletal:  Positive for back pain.   Skin: Negative.    Neurological:  Positive for weakness and numbness.   Hematological: Negative.    Psychiatric/Behavioral: Negative.         PAST MEDICAL HISTORY  Past Medical History:   Diagnosis Date   • Anxiety 4/01/23   • Back pain    • Cancer (Multi) 10/01/0-   • CHF (congestive heart failure) 10/01/23   • Depression 10/01/23   • Extremity pain    • Headache    • Hypothyroidism    • Low back  pain    • Thyroid cancer (Multi)      Past Surgical History:   Procedure Laterality Date   • APPENDECTOMY  10/01/96   • CARDIAC CATHETERIZATION     • COLONOSCOPY  09/11/2020   • HERNIA REPAIR     • THYROIDECTOMY     • VASECTOMY       Family History   Problem Relation Name Age of Onset   • Heart disease Mother Kim    • Heart disease Sister (1)    • Other (pacemaker) Sister (1)    • Heart disease Brother Skye    • Atrial fibrillation Brother Gilberto        CURRENT MEDICATIONS  Current Outpatient Medications   Medication Sig Dispense Refill   • ALPRAZolam (Xanax) 1 mg tablet Take 1 tablet by mouth in the morning, 1 tablet at midday, and 1-2 tablets at bedtime as needed 360 tablet 0   • ALPRAZolam (Xanax) 1 mg tablet Take 1 tablet by mouth in the morning, 1 tablets at midday, and 1-2 tablets at bedtime as needed 360 tablet 0   • amLODIPine (Norvasc) 5 mg tablet Take 1 tablet (5 mg) by mouth once daily. 90 tablet 3   • baclofen (Lioresal) 5 mg tablet Take 1 tablet (5 mg) by mouth 3 times a day. 30 tablet 2   • carvedilol (Coreg) 12.5 mg tablet TAKE 1 TABLET BY MOUTH TWO TIMES A DAY WITH FOOD 180 tablet 3   • furosemide (Lasix) 40 mg tablet TAKE ONE TABLET BY MOUTH ONCE A DAY 90 tablet 3   • levothyroxine (Synthroid) 150 mcg tablet Take 1 tablet (150 mcg) by mouth on MONDAY through SATURDAY, and take 1 AND 1/2 tablets on SUNDAY as directed 96 tablet 1   • lidocaine (Aspercreme, lidocaine,) 4 % patch Unwrap and apply 1 patch every 12 hours if needed (low back pain). Remove & discard patch within 12 hours or as directed by MD. 30 patch 2   • naloxone (Narcan) 4 mg/0.1 mL nasal spray Administer 1 spray (4 mg) into affected nostril(s) if needed for opioid reversal. May repeat every 2-3 minutes if needed, alternating nostrils, until medical assistance becomes available. 2 each 0   • nystatin (Mycostatin) 100,000 unit/gram powder Apply 1 Application topically 2 times a day. 60 g 1   • oxyCODONE-acetaminophen (Percocet) 7.5-325  "mg tablet Take 1 tablet by mouth 4 times a day as needed for severe pain (7 - 10). 120 tablet 0   • potassium chloride CR (Klor-Con) 10 mEq ER tablet Take 1 tablet (10 mEq) by mouth 2 times a day. 180 tablet 3   • sacubitriL-valsartan (Entresto)  mg tablet Take 1 tablet by mouth 2 times a day. 180 tablet 3   • sildenafil (Viagra) 50 mg tablet Take 1 tablet (50 mg) by mouth once daily as needed for erectile dysfunction. 12 tablet 3   • venlafaxine XR (Effexor-XR) 150 mg 24 hr capsule Take 1 tablet by mouth once a day (take with 75mg capsule for a total of 225mg daily) 90 capsule 0   • venlafaxine XR (Effexor-XR) 75 mg 24 hr capsule Take one capsule by mouth once a day (take with 150mg capsule for a total of 225mg daily) 90 capsule 0   • venlafaxine XR (Effexor-XR) 150 mg 24 hr capsule Take 1 tablet by mouth once a day (take with 75mg capsule for a total of 225mg daily) (Patient not taking: Reported on 1/8/2025) 90 capsule 0   • venlafaxine XR (Effexor-XR) 150 mg 24 hr capsule Take 1 tablet by mouth once a day (take with 75mg capsule for a total of 225mg daily) (Patient not taking: Reported on 1/8/2025) 90 capsule 0   • venlafaxine XR (Effexor-XR) 75 mg 24 hr capsule Take one capsule by mouth once a day (take with 150mg capsule for a total of 225mg daily) 90 capsule 0   • venlafaxine XR (Effexor-XR) 75 mg 24 hr capsule Take one capsule by mouth once a day (take with 150mg capsule for a total of 225mg daily) (Patient not taking: Reported on 1/8/2025) 90 capsule 0     No current facility-administered medications for this visit.       ALLERGIES AND DRUG REACTIONS  Allergies   Allergen Reactions   • Latex Rash   • Sulfa (Sulfonamide Antibiotics) Rash and Unknown          OBJECTIVE  Visit Vitals  /82   Pulse 95   Resp 18   Ht 1.803 m (5' 11\")   Wt 111 kg (244 lb)   SpO2 99%   BMI 34.03 kg/m²   Smoking Status Former   BSA 2.36 m²       Last Recorded Pain Score (if available):       Pain Score:   7     Physical " "Exam  General: Sitting in chair, NAD, antalgic gait with cane and hunched forward    Head: NCAT  Eyes: Sclera/conjunctiva clear, EOMI, PERRL  Nose/mouth: MMM  CV: Good distal pulses  Lungs: Good/equal chest excursion  Abdomen: Soft, ND  Ext: No cyanosis/edema  MSK: L-spine alignment: unremarkable, BL paraspinal m/BL PSIS TTP, L-spine ROM: ext limited by pain  +Jaspreet BL, JEANINE BL, thigh thrust BL, [place] BL    Neuro: AAOx3, CN grossly nl, CN grossly nl      Psych: affect nl, affect nl  Skin: no rash      REVIEW OF LABORATORY DATA  I have reviewed the following lab results:  WBC   Date Value Ref Range Status   09/20/2024 7.6 4.4 - 11.3 x10*3/uL Final     RBC   Date Value Ref Range Status   09/20/2024 4.06 (L) 4.50 - 5.90 x10*6/uL Final     Hemoglobin   Date Value Ref Range Status   09/20/2024 12.4 (L) 13.5 - 17.5 g/dL Final     Hematocrit   Date Value Ref Range Status   09/20/2024 38.9 (L) 41.0 - 52.0 % Final     MCV   Date Value Ref Range Status   09/20/2024 96 80 - 100 fL Final     MCH   Date Value Ref Range Status   09/20/2024 30.5 26.0 - 34.0 pg Final     MCHC   Date Value Ref Range Status   09/20/2024 31.9 (L) 32.0 - 36.0 g/dL Final     RDW   Date Value Ref Range Status   09/20/2024 13.3 11.5 - 14.5 % Final     Platelets   Date Value Ref Range Status   09/20/2024 222 150 - 450 x10*3/uL Final     MPV   Date Value Ref Range Status   11/22/2022 9.4 7.0 - 12.6 CU Final     Sodium   Date Value Ref Range Status   09/20/2024 136 136 - 145 mmol/L Final     Potassium   Date Value Ref Range Status   09/20/2024 4.9 3.5 - 5.3 mmol/L Final     Bicarbonate   Date Value Ref Range Status   09/20/2024 31 21 - 32 mmol/L Final     Urea Nitrogen   Date Value Ref Range Status   09/20/2024 17 6 - 23 mg/dL Final     Calcium   Date Value Ref Range Status   09/20/2024 8.8 8.6 - 10.3 mg/dL Final     No results found for: \"PROTIME\", \"PTT\", \"INR\", \"FIBRINOGEN\"      REVIEW OF RADIOLOGY   I have reviewed the following:  Radiology Studies   "         MRI L-spine 7/3/24:         ASSESSMENT & PLAN  Todd Small is a 59 y.o. male with PMH CHF 2/2 former EtOH use, former smoker, CKD, MDD, papillary thyroid ca s/p thyroidectomy/RT with hypothyroidism who presents  for F/U    1) Lumbar spinal stenosis, improved  -LBP radiating down BLE with standing/walking affecting gait, function, ADLs, and QoL  -Refractive to Tylenol, biofreeze, SNRI, Norco  -NSAIDs contraindicated 2/2 CKD. MDP may worsen rash  -Unable to meaningfully participate in PT 2/2 pain severity and late-stage CHF  -MRI L-spine 7/3/24:  multilevel spondylosis featuring moderate L3-4 stenosis  -BL L3-4 TFESI 11/12/24: 70% ongoing relief    2) Sacroiliitis  -BL SIJ-mediated pain reproduced on multiple SIJ-provocative maneuvers  -Refractive to yrs of conservative tx including Tylenol, biofreeze, Norco  -Schedule R SIJ fusion. Consider left afterwards:  --CT pelvis 10/15/24: mild BL hip OA, mild BL SIJ OA  --Therapeutic BL SIJ CSI 9/3/24: 100% relief 3 h  --Dx BL SIJ injxn #1 10/29/24: 100% relief 3 d  --Dx BL SIJ injxn #2 1/7/25: 100% relief    3) Long-term opiate use  -2/2 chronic LB and leg pain refractive to conservative, interventional tx  -On Percocet 7.5 mg QID PRN (45 MME) with moderate relief. Denies SE. Requests RF  - reviewed/appropriate. Reviewed DS 10/14/24: OK. No sign of aberrant behavior. UDS today  -Educated on/prescribed Narcan 8/12/24 2/2 benzo co-prescription  -RF Percocet x30 d. No dose inc cristian in context of benzo co-use  -F/U 1 mo        Discussed posterior percutaneous sacroiliac joint fusion to address patients sacroiliac joint-mediated pain. Patient's pain generator has failed to respond to extensive conservative treatment including medication management, physical therapy/home exercise program, therapeutic sacroiliac joint injections with corticosteroid. Patient has undergone ]2 diagnostic sacroiliac joint injections with local anesthetic only revealing >75% relief for the  duration of the medication. Discussed how the posterior approach to distracting and fusing the sacroiliac joint is a minimally-invasive alternative to traditional lateral fusion. Reviewed the patient can expect a faster recovery than a traditional sacroiliac joint fusion. Via the posterolateral approach, instrumentation will be a safer distance from important nerves and blood vessels, which also decreases the risk of the procedure. Discussed literature. Discussed uncommon, but present risk of bleeding, infection, nerve damage, hardware failure, failure to achieve pain relief and the mitigating factors taken to ensure the best possible outcome for the patient.  All questions answered. Patient provided with literature to continue their investigation and will reach out should they choose to proceed.    Discussed procedure risks/benefits in detail with patient. Pt meets medical necessity for procedure due to failure of conservative measures. Reviewed procedural risks including bleeding, infection, nerve damage, paralysis. Also reviewed mitigating factors such as screening for infection/blood thinner use, sterile precautions, and image-guidance when applicable. All questions answered. Pt/guardian expressed understanding and choose to proceed                 Nathaly Cantrell MD  Anesthesiologist & Interventional Pain Physician   [facility] [place]  O: [phone]  F: [phone]  8:53 AM  01/08/25

## 2025-01-08 NOTE — PROGRESS NOTES
MEDICATION NAME: Percoet  STRENGTH: 7.5/325  LAST FILL DATE: 12/13/24  DATE LAST TAKEN: 1/7/25  QUANTITY FILLED: 120  QUANTITY REMAINING: 15  COUNT COMPLETED BY: KASH MELO RN and CARLOS SOSA       UDS LAST COMPLETED:   CONTROLLED SUBSTANCES AGREEMENT LAST SIGNED:   ORT LAST COMPLETED:  Modified Oswestry disability form filled out annually.

## 2025-01-08 NOTE — PROGRESS NOTES
PAIN MANAGEMENT FOLLOW-UP OFFICE NOTE    Date of Service: 1/8/2025    SUBJECTIVE    CHIEF COMPLAINT: LBP    HISTORY OF PRESENT ILLNESS    Todd Small is a 59 y.o. male with PMH CHF 2/2 former EtOH use, former smoker, CKD, MDD, papillary thyroid ca s/p thyroidectomy/RT with hypothyroidism who presents for F/U    On 1/7, pt underwent BL SIJ injxn with 100% relief. He is interested in SIJ fusion.  Taking Percocet 7.5 mg QID PRN with good relief. Denies SE. Requests RF.     Pt denies new-onset bowel/bladder incontinence.  Pt denies recent infection, allergy to Latex/iodine/contrast. Patient is currently taking the following blood thinner(s): N/A    Procedure log:  -Dx BL SIJ injxn #2 1/7/25: 100% relief  -BL L3-4 TFESI 11/12/24: 70% ongoing relief  -Dx BL SIJ injxn #1 10/29/24: 100% relief 3 d  -BL SIJ  CSI 9/3/24: 100% relief 3 hours before return to baseline   -BL L3-4 TFESI 7/30/24: 50% relief in his legs, but 30% relief in LB      REVIEW OF SYSTEMS  Review of Systems   Constitutional: Negative.    HENT: Negative.     Eyes: Negative.    Respiratory: Negative.     Cardiovascular: Negative.    Gastrointestinal: Negative.    Endocrine: Negative.    Musculoskeletal:  Positive for back pain.   Skin: Negative.    Neurological:  Positive for weakness and numbness.   Hematological: Negative.    Psychiatric/Behavioral: Negative.         PAST MEDICAL HISTORY  Past Medical History:   Diagnosis Date    Anxiety 4/01/23    Back pain     Cancer (Multi) 10/01/0-    CHF (congestive heart failure) 10/01/23    Depression 10/01/23    Extremity pain     Headache     Hypothyroidism     Low back pain     Thyroid cancer (Multi)      Past Surgical History:   Procedure Laterality Date    APPENDECTOMY  10/01/96    CARDIAC CATHETERIZATION      COLONOSCOPY  09/11/2020    HERNIA REPAIR      THYROIDECTOMY      VASECTOMY       Family History   Problem Relation Name Age of Onset    Heart disease Mother Kim     Heart disease Sister (1)     Other  (pacemaker) Sister (1)     Heart disease Brother Skye     Atrial fibrillation Brother Gilberto        CURRENT MEDICATIONS  Current Outpatient Medications   Medication Sig Dispense Refill    ALPRAZolam (Xanax) 1 mg tablet Take 1 tablet by mouth in the morning, 1 tablet at midday, and 1-2 tablets at bedtime as needed 360 tablet 0    ALPRAZolam (Xanax) 1 mg tablet Take 1 tablet by mouth in the morning, 1 tablets at midday, and 1-2 tablets at bedtime as needed 360 tablet 0    amLODIPine (Norvasc) 5 mg tablet Take 1 tablet (5 mg) by mouth once daily. 90 tablet 3    baclofen (Lioresal) 5 mg tablet Take 1 tablet (5 mg) by mouth 3 times a day. 30 tablet 2    carvedilol (Coreg) 12.5 mg tablet TAKE 1 TABLET BY MOUTH TWO TIMES A DAY WITH FOOD 180 tablet 3    furosemide (Lasix) 40 mg tablet TAKE ONE TABLET BY MOUTH ONCE A DAY 90 tablet 3    levothyroxine (Synthroid) 150 mcg tablet Take 1 tablet (150 mcg) by mouth on MONDAY through SATURDAY, and take 1 AND 1/2 tablets on SUNDAY as directed 96 tablet 1    lidocaine (Aspercreme, lidocaine,) 4 % patch Unwrap and apply 1 patch every 12 hours if needed (low back pain). Remove & discard patch within 12 hours or as directed by MD. 30 patch 2    naloxone (Narcan) 4 mg/0.1 mL nasal spray Administer 1 spray (4 mg) into affected nostril(s) if needed for opioid reversal. May repeat every 2-3 minutes if needed, alternating nostrils, until medical assistance becomes available. 2 each 0    nystatin (Mycostatin) 100,000 unit/gram powder Apply 1 Application topically 2 times a day. 60 g 1    oxyCODONE-acetaminophen (Percocet) 7.5-325 mg tablet Take 1 tablet by mouth 4 times a day as needed for severe pain (7 - 10). 120 tablet 0    potassium chloride CR (Klor-Con) 10 mEq ER tablet Take 1 tablet (10 mEq) by mouth 2 times a day. 180 tablet 3    sacubitriL-valsartan (Entresto)  mg tablet Take 1 tablet by mouth 2 times a day. 180 tablet 3    sildenafil (Viagra) 50 mg tablet Take 1 tablet (50 mg)  "by mouth once daily as needed for erectile dysfunction. 12 tablet 3    venlafaxine XR (Effexor-XR) 150 mg 24 hr capsule Take 1 tablet by mouth once a day (take with 75mg capsule for a total of 225mg daily) 90 capsule 0    venlafaxine XR (Effexor-XR) 75 mg 24 hr capsule Take one capsule by mouth once a day (take with 150mg capsule for a total of 225mg daily) 90 capsule 0    venlafaxine XR (Effexor-XR) 150 mg 24 hr capsule Take 1 tablet by mouth once a day (take with 75mg capsule for a total of 225mg daily) (Patient not taking: Reported on 1/8/2025) 90 capsule 0    venlafaxine XR (Effexor-XR) 150 mg 24 hr capsule Take 1 tablet by mouth once a day (take with 75mg capsule for a total of 225mg daily) (Patient not taking: Reported on 1/8/2025) 90 capsule 0    venlafaxine XR (Effexor-XR) 75 mg 24 hr capsule Take one capsule by mouth once a day (take with 150mg capsule for a total of 225mg daily) 90 capsule 0    venlafaxine XR (Effexor-XR) 75 mg 24 hr capsule Take one capsule by mouth once a day (take with 150mg capsule for a total of 225mg daily) (Patient not taking: Reported on 1/8/2025) 90 capsule 0     No current facility-administered medications for this visit.       ALLERGIES AND DRUG REACTIONS  Allergies   Allergen Reactions    Latex Rash    Sulfa (Sulfonamide Antibiotics) Rash and Unknown          OBJECTIVE  Visit Vitals  /82   Pulse 95   Resp 18   Ht 1.803 m (5' 11\")   Wt 111 kg (244 lb)   SpO2 99%   BMI 34.03 kg/m²   Smoking Status Former   BSA 2.36 m²       Last Recorded Pain Score (if available):       Pain Score:   7     Physical Exam  General: Sitting in chair, NAD, antalgic gait with cane and hunched forward    Head: NCAT  Eyes: Sclera/conjunctiva clear, EOMI, PERRL  Nose/mouth: MMM  CV: Good distal pulses  Lungs: Good/equal chest excursion  Abdomen: Soft, ND  Ext: No cyanosis/edema  MSK: L-spine alignment: unremarkable, BL paraspinal m/BL PSIS TTP, L-spine ROM: ext limited by pain  +Jaspreet BLANGIEER " "BL, thigh thrust BL, [place] BL    Neuro: AAOx3, CN grossly nl, CN grossly nl      Psych: affect nl, affect nl  Skin: no rash      REVIEW OF LABORATORY DATA  I have reviewed the following lab results:  WBC   Date Value Ref Range Status   09/20/2024 7.6 4.4 - 11.3 x10*3/uL Final     RBC   Date Value Ref Range Status   09/20/2024 4.06 (L) 4.50 - 5.90 x10*6/uL Final     Hemoglobin   Date Value Ref Range Status   09/20/2024 12.4 (L) 13.5 - 17.5 g/dL Final     Hematocrit   Date Value Ref Range Status   09/20/2024 38.9 (L) 41.0 - 52.0 % Final     MCV   Date Value Ref Range Status   09/20/2024 96 80 - 100 fL Final     MCH   Date Value Ref Range Status   09/20/2024 30.5 26.0 - 34.0 pg Final     MCHC   Date Value Ref Range Status   09/20/2024 31.9 (L) 32.0 - 36.0 g/dL Final     RDW   Date Value Ref Range Status   09/20/2024 13.3 11.5 - 14.5 % Final     Platelets   Date Value Ref Range Status   09/20/2024 222 150 - 450 x10*3/uL Final     MPV   Date Value Ref Range Status   11/22/2022 9.4 7.0 - 12.6 CU Final     Sodium   Date Value Ref Range Status   09/20/2024 136 136 - 145 mmol/L Final     Potassium   Date Value Ref Range Status   09/20/2024 4.9 3.5 - 5.3 mmol/L Final     Bicarbonate   Date Value Ref Range Status   09/20/2024 31 21 - 32 mmol/L Final     Urea Nitrogen   Date Value Ref Range Status   09/20/2024 17 6 - 23 mg/dL Final     Calcium   Date Value Ref Range Status   09/20/2024 8.8 8.6 - 10.3 mg/dL Final     No results found for: \"PROTIME\", \"PTT\", \"INR\", \"FIBRINOGEN\"      REVIEW OF RADIOLOGY   I have reviewed the following:  Radiology Studies           MRI L-spine 7/3/24:         ASSESSMENT & PLAN  Todd Small is a 59 y.o. male with PMH CHF 2/2 former EtOH use, former smoker, CKD, MDD, papillary thyroid ca s/p thyroidectomy/RT with hypothyroidism who presents  for F/U    1) Lumbar spinal stenosis, improved  -LBP radiating down BLE with standing/walking affecting gait, function, ADLs, and QoL  -Refractive to " Tylenol, biofreeze, SNRI, Norco  -NSAIDs contraindicated 2/2 CKD. MDP may worsen rash  -Unable to meaningfully participate in PT 2/2 pain severity and late-stage CHF  -MRI L-spine 7/3/24:  multilevel spondylosis featuring moderate L3-4 stenosis  -BL L3-4 TFESI 11/12/24: 70% ongoing relief    2) Sacroiliitis  -BL SIJ-mediated pain reproduced on multiple SIJ-provocative maneuvers  -Refractive to yrs of conservative tx including Tylenol, biofreeze, Norco  -Schedule R SIJ fusion. Consider left afterwards:  --CT pelvis 10/15/24: mild BL hip OA, mild BL SIJ OA  --Therapeutic BL SIJ CSI 9/3/24: 100% relief 3 h  --Dx BL SIJ injxn #1 10/29/24: 100% relief 3 d  --Dx BL SIJ injxn #2 1/7/25: 100% relief    3) Long-term opiate use  -2/2 chronic LB and leg pain refractive to conservative, interventional tx  -On Percocet 7.5 mg QID PRN (45 MME) with moderate relief. Denies SE. Requests RF  - reviewed/appropriate. Reviewed DS 10/14/24: OK. No sign of aberrant behavior. UDS today  -Educated on/prescribed Narcan 8/12/24 2/2 benzo co-prescription  -RF Percocet x30 d. No dose inc cristian in context of benzo co-use  -F/U 1 mo        Discussed posterior percutaneous sacroiliac joint fusion to address patients sacroiliac joint-mediated pain. Patient's pain generator has failed to respond to extensive conservative treatment including medication management, physical therapy/home exercise program, therapeutic sacroiliac joint injections with corticosteroid. Patient has undergone ]2 diagnostic sacroiliac joint injections with local anesthetic only revealing >75% relief for the duration of the medication. Discussed how the posterior approach to distracting and fusing the sacroiliac joint is a minimally-invasive alternative to traditional lateral fusion. Reviewed the patient can expect a faster recovery than a traditional sacroiliac joint fusion. Via the posterolateral approach, instrumentation will be a safer distance from important nerves and  blood vessels, which also decreases the risk of the procedure. Discussed literature. Discussed uncommon, but present risk of bleeding, infection, nerve damage, hardware failure, failure to achieve pain relief and the mitigating factors taken to ensure the best possible outcome for the patient.  All questions answered. Patient provided with literature to continue their investigation and will reach out should they choose to proceed.    Discussed procedure risks/benefits in detail with patient. Pt meets medical necessity for procedure due to failure of conservative measures. Reviewed procedural risks including bleeding, infection, nerve damage, paralysis. Also reviewed mitigating factors such as screening for infection/blood thinner use, sterile precautions, and image-guidance when applicable. All questions answered. Pt/guardian expressed understanding and choose to proceed                 Nathaly Cantrell MD  Anesthesiologist & Interventional Pain Physician   [facility] [place]  O: [phone]  F: [phone]  8:53 AM  01/08/25

## 2025-01-09 LAB
AMPHETAMINES UR QL SCN: NORMAL
BARBITURATES UR QL SCN: NORMAL
BZE UR QL SCN: NORMAL
CANNABINOIDS UR QL SCN: NORMAL
CREAT UR-MCNC: 81 MG/DL (ref 20–370)
PCP UR QL SCN: NORMAL

## 2025-01-09 PROCEDURE — RXMED WILLOW AMBULATORY MEDICATION CHARGE

## 2025-01-10 ENCOUNTER — PHARMACY VISIT (OUTPATIENT)
Dept: PHARMACY | Facility: CLINIC | Age: 60
End: 2025-01-10
Payer: MEDICAID

## 2025-01-10 LAB
1OH-MIDAZOLAM UR CFM-MCNC: 380 NG/ML
6MAM UR CFM-MCNC: <25 NG/ML
7AMINOCLONAZEPAM UR CFM-MCNC: <25 NG/ML
A-OH ALPRAZ UR CFM-MCNC: 977 NG/ML
ALPRAZ UR CFM-MCNC: 628 NG/ML
CHLORDIAZEP UR CFM-MCNC: <25 NG/ML
CLONAZEPAM UR CFM-MCNC: <25 NG/ML
CODEINE UR CFM-MCNC: <50 NG/ML
DIAZEPAM UR CFM-MCNC: <25 NG/ML
EDDP UR CFM-MCNC: <25 NG/ML
FENTANYL UR CFM-MCNC: <2.5 NG/ML
HYDROCODONE CTO UR CFM-MCNC: <25 NG/ML
HYDROMORPHONE UR CFM-MCNC: <25 NG/ML
LORAZEPAM UR CFM-MCNC: <25 NG/ML
METHADONE UR CFM-MCNC: <25 NG/ML
MIDAZOLAM UR CFM-MCNC: <25 NG/ML
MORPHINE UR CFM-MCNC: <50 NG/ML
NORDIAZEPAM UR CFM-MCNC: <25 NG/ML
NORFENTANYL UR CFM-MCNC: <2.5 NG/ML
NORHYDROCODONE UR CFM-MCNC: <25 NG/ML
NOROXYCODONE UR CFM-MCNC: 232 NG/ML
NORTRAMADOL UR-MCNC: <50 NG/ML
OXAZEPAM UR CFM-MCNC: <25 NG/ML
OXYCODONE UR CFM-MCNC: 50 NG/ML
OXYMORPHONE UR CFM-MCNC: 293 NG/ML
TEMAZEPAM UR CFM-MCNC: <25 NG/ML
TRAMADOL UR CFM-MCNC: <50 NG/ML
ZOLPIDEM UR CFM-MCNC: <25 NG/ML
ZOLPIDEM UR-MCNC: <25 NG/ML

## 2025-01-10 PROCEDURE — RXMED WILLOW AMBULATORY MEDICATION CHARGE

## 2025-01-11 ENCOUNTER — PHARMACY VISIT (OUTPATIENT)
Dept: PHARMACY | Facility: CLINIC | Age: 60
End: 2025-01-11
Payer: MEDICAID

## 2025-01-11 PROCEDURE — RXMED WILLOW AMBULATORY MEDICATION CHARGE

## 2025-01-12 ENCOUNTER — PATIENT MESSAGE (OUTPATIENT)
Dept: PRIMARY CARE | Facility: CLINIC | Age: 60
End: 2025-01-12
Payer: COMMERCIAL

## 2025-01-12 DIAGNOSIS — B35.6 TINEA CRURIS: ICD-10-CM

## 2025-01-12 DIAGNOSIS — R39.198 ABNORMAL URINATION: Primary | ICD-10-CM

## 2025-01-14 ENCOUNTER — PHARMACY VISIT (OUTPATIENT)
Dept: PHARMACY | Facility: CLINIC | Age: 60
End: 2025-01-14
Payer: MEDICAID

## 2025-01-14 PROCEDURE — RXMED WILLOW AMBULATORY MEDICATION CHARGE

## 2025-01-14 RX ORDER — FLUCONAZOLE 200 MG/1
200 TABLET ORAL DAILY
Qty: 14 TABLET | Refills: 0 | Status: SHIPPED | OUTPATIENT
Start: 2025-01-14 | End: 2025-01-28

## 2025-01-16 ENCOUNTER — PHARMACY VISIT (OUTPATIENT)
Dept: PHARMACY | Facility: CLINIC | Age: 60
End: 2025-01-16
Payer: MEDICAID

## 2025-01-16 PROCEDURE — RXMED WILLOW AMBULATORY MEDICATION CHARGE

## 2025-01-16 RX ORDER — CLOTRIMAZOLE AND BETAMETHASONE DIPROPIONATE 10; .64 MG/G; MG/G
1 CREAM TOPICAL 2 TIMES DAILY
Qty: 45 G | Refills: 1 | Status: SHIPPED | OUTPATIENT
Start: 2025-01-16 | End: 2025-02-15

## 2025-01-20 PROCEDURE — RXMED WILLOW AMBULATORY MEDICATION CHARGE

## 2025-01-21 ENCOUNTER — PHARMACY VISIT (OUTPATIENT)
Dept: PHARMACY | Facility: CLINIC | Age: 60
End: 2025-01-21
Payer: MEDICAID

## 2025-02-03 ENCOUNTER — APPOINTMENT (OUTPATIENT)
Dept: PRIMARY CARE | Facility: CLINIC | Age: 60
End: 2025-02-03
Payer: COMMERCIAL

## 2025-02-05 PROCEDURE — RXMED WILLOW AMBULATORY MEDICATION CHARGE

## 2025-02-07 ENCOUNTER — PHARMACY VISIT (OUTPATIENT)
Dept: PHARMACY | Facility: CLINIC | Age: 60
End: 2025-02-07
Payer: MEDICAID

## 2025-02-10 ENCOUNTER — OFFICE VISIT (OUTPATIENT)
Dept: PAIN MEDICINE | Facility: CLINIC | Age: 60
End: 2025-02-10
Payer: COMMERCIAL

## 2025-02-10 ENCOUNTER — PHARMACY VISIT (OUTPATIENT)
Dept: PHARMACY | Facility: CLINIC | Age: 60
End: 2025-02-10
Payer: MEDICAID

## 2025-02-10 VITALS — HEART RATE: 98 BPM | SYSTOLIC BLOOD PRESSURE: 128 MMHG | DIASTOLIC BLOOD PRESSURE: 86 MMHG | OXYGEN SATURATION: 95 %

## 2025-02-10 DIAGNOSIS — M48.061 SPINAL STENOSIS OF LUMBAR REGION WITHOUT NEUROGENIC CLAUDICATION: Primary | ICD-10-CM

## 2025-02-10 DIAGNOSIS — Z79.891 LONG TERM CURRENT USE OF OPIATE ANALGESIC: ICD-10-CM

## 2025-02-10 DIAGNOSIS — M54.16 LUMBAR RADICULOPATHY: ICD-10-CM

## 2025-02-10 DIAGNOSIS — M46.1 SACROILIITIS (CMS-HCC): ICD-10-CM

## 2025-02-10 PROCEDURE — 99214 OFFICE O/P EST MOD 30 MIN: CPT | Performed by: ANESTHESIOLOGY

## 2025-02-10 PROCEDURE — RXMED WILLOW AMBULATORY MEDICATION CHARGE

## 2025-02-10 PROCEDURE — 1036F TOBACCO NON-USER: CPT | Performed by: ANESTHESIOLOGY

## 2025-02-10 PROCEDURE — 3079F DIAST BP 80-89 MM HG: CPT | Performed by: ANESTHESIOLOGY

## 2025-02-10 PROCEDURE — 3074F SYST BP LT 130 MM HG: CPT | Performed by: ANESTHESIOLOGY

## 2025-02-10 RX ORDER — OXYCODONE AND ACETAMINOPHEN 7.5; 325 MG/1; MG/1
1 TABLET ORAL 4 TIMES DAILY PRN
Qty: 120 TABLET | Refills: 0 | Status: SHIPPED | OUTPATIENT
Start: 2025-02-10 | End: 2025-03-12

## 2025-02-10 ASSESSMENT — ENCOUNTER SYMPTOMS
OCCASIONAL FEELINGS OF UNSTEADINESS: 0
GASTROINTESTINAL NEGATIVE: 1
BACK PAIN: 1
CONSTITUTIONAL NEGATIVE: 1
CARDIOVASCULAR NEGATIVE: 1
WEAKNESS: 1
ENDOCRINE NEGATIVE: 1
EYES NEGATIVE: 1
PSYCHIATRIC NEGATIVE: 1
RESPIRATORY NEGATIVE: 1
HEMATOLOGIC/LYMPHATIC NEGATIVE: 1
LOSS OF SENSATION IN FEET: 0
DEPRESSION: 0
NUMBNESS: 1

## 2025-02-10 ASSESSMENT — PAIN SCALES - GENERAL
PAINLEVEL_OUTOF10: 7
PAINLEVEL_OUTOF10: 7

## 2025-02-10 ASSESSMENT — PAIN DESCRIPTION - DESCRIPTORS: DESCRIPTORS: ACHING;SHARP

## 2025-02-10 ASSESSMENT — PAIN - FUNCTIONAL ASSESSMENT: PAIN_FUNCTIONAL_ASSESSMENT: 0-10

## 2025-02-10 NOTE — PROGRESS NOTES
MEDICATION NAME: percocet  STRENGTH: 7.5-325 mg  LAST FILL DATE: 1/11/2025  DATE LAST TAKEN: 02/10/25  QUANTITY FILLED: 120  QUANTITY REMAINING: 3  COUNT COMPLETED BY: TAY JACKSON RN and BECKIE HARTLEY LPN      UDS LAST COMPLETED:   CONTROLLED SUBSTANCES AGREEMENT LAST SIGNED:   ORT LAST COMPLETED:  Modified Osw[employer] disability form filled out annually.

## 2025-02-10 NOTE — PROGRESS NOTES
PAIN MANAGEMENT FOLLOW-UP OFFICE NOTE    Date of Service: 2/10/2025    SUBJECTIVE    CHIEF COMPLAINT: LBP    HISTORY OF PRESENT ILLNESS    Todd Small is a 59 y.o. male with PMH CHF 2/2 former EtOH use, former smoker, CKD, MDD, papillary thyroid ca s/p thyroidectomy/RT with hypothyroidism who presents for F/U    Since his last visit, pt maintains LBP. Anticipating  R SIJ fusion approval. Noticing more pain radiating to R>LLE with standing/walking. Taking Percocet 7.5 mg QID PRN with good relief of LBP. Denies SE. Requests RF.     Pt denies new-onset bowel/bladder incontinence.  Pt denies recent infection, allergy to Latex/iodine/contrast. Patient is currently taking the following blood thinner(s): N/A    Procedure log:  -Dx BL SIJ injxn #2 1/7/25: 100% relief  -BL L3-4 TFESI 11/12/24: 70% relief 3 mo  -Dx BL SIJ injxn #1 10/29/24: 100% relief 3 d  -BL SIJ  CSI 9/3/24: 100% relief 3 hours before return to baseline   -BL L3-4 TFESI 7/30/24: 50% relief in his legs, but 30% relief in LB      REVIEW OF SYSTEMS  Review of Systems   Constitutional: Negative.    HENT: Negative.     Eyes: Negative.    Respiratory: Negative.     Cardiovascular: Negative.    Gastrointestinal: Negative.    Endocrine: Negative.    Musculoskeletal:  Positive for back pain.   Skin: Negative.    Neurological:  Positive for weakness and numbness.   Hematological: Negative.    Psychiatric/Behavioral: Negative.         PAST MEDICAL HISTORY  Past Medical History:   Diagnosis Date    Anxiety 4/01/23    Back pain     Cancer (Multi) 10/01/0-    CHF (congestive heart failure) 10/01/23    Depression 10/01/23    Extremity pain     Headache     Hypothyroidism     Low back pain     Thyroid cancer (Multi)      Past Surgical History:   Procedure Laterality Date    APPENDECTOMY  10/01/96    CARDIAC CATHETERIZATION      COLONOSCOPY  09/11/2020    HERNIA REPAIR      THYROIDECTOMY      VASECTOMY       Family History   Problem Relation Name Age of Onset    Heart  disease Mother Kim     Heart disease Sister (1)     Other (pacemaker) Sister (1)     Heart disease Brother Skye     Atrial fibrillation Brother Gilberto        CURRENT MEDICATIONS  Current Outpatient Medications   Medication Sig Dispense Refill    ALPRAZolam (Xanax) 1 mg tablet Take 1 tablet by mouth in the morning, 1 tablet at midday, and 1-2 tablets at bedtime as needed 360 tablet 0    ALPRAZolam (Xanax) 1 mg tablet Take 1 tablet by mouth in the morning, 1 tablets at midday, and 1-2 tablets at bedtime as needed 360 tablet 0    amLODIPine (Norvasc) 5 mg tablet Take 1 tablet (5 mg) by mouth once daily. 90 tablet 3    carvedilol (Coreg) 12.5 mg tablet TAKE 1 TABLET BY MOUTH TWO TIMES A DAY WITH FOOD 180 tablet 3    clotrimazole-betamethasone (Lotrisone) cream Apply 1 Application topically 2 times a day for 28 days. 45 g 1    furosemide (Lasix) 40 mg tablet TAKE ONE TABLET BY MOUTH ONCE A DAY 90 tablet 3    levothyroxine (Synthroid) 150 mcg tablet Take 1 tablet (150 mcg) by mouth on MONDAY through SATURDAY, and take 1 AND 1/2 tablets on SUNDAY as directed 96 tablet 1    lidocaine (Aspercreme, lidocaine,) 4 % patch Unwrap and apply 1 patch every 12 hours if needed (low back pain). Remove & discard patch within 12 hours or as directed by MD. 30 patch 2    naloxone (Narcan) 4 mg/0.1 mL nasal spray Administer 1 spray (4 mg) into affected nostril(s) if needed for opioid reversal. May repeat every 2-3 minutes if needed, alternating nostrils, until medical assistance becomes available. 2 each 0    nystatin (Mycostatin) 100,000 unit/gram powder Apply 1 Application topically 2 times a day. 60 g 1    oxyCODONE-acetaminophen (Percocet) 7.5-325 mg tablet Take 1 tablet by mouth 4 times a day as needed for severe pain (7 - 10). Do not fill before January 11, 2025. 120 tablet 0    potassium chloride CR (Klor-Con) 10 mEq ER tablet Take 1 tablet (10 mEq) by mouth 2 times a day. 180 tablet 3    sacubitriL-valsartan (Entresto)  mg  tablet Take 1 tablet by mouth 2 times a day. 180 tablet 3    venlafaxine XR (Effexor-XR) 150 mg 24 hr capsule Take 1 tablet by mouth once a day (take with 75mg capsule for a total of 225mg daily) 90 capsule 0    venlafaxine XR (Effexor-XR) 75 mg 24 hr capsule Take one capsule by mouth once a day (take with 150mg capsule for a total of 225mg daily) 90 capsule 0    baclofen (Lioresal) 5 mg tablet Take 1 tablet (5 mg) by mouth 3 times a day. 30 tablet 2    sildenafil (Viagra) 50 mg tablet Take 1 tablet (50 mg) by mouth once daily as needed for erectile dysfunction. 12 tablet 3     No current facility-administered medications for this visit.       ALLERGIES AND DRUG REACTIONS  Allergies   Allergen Reactions    Latex Rash    Sulfa (Sulfonamide Antibiotics) Rash and Unknown          OBJECTIVE  Visit Vitals  /86   Pulse 98   SpO2 95%   Smoking Status Former       Last Recorded Pain Score (if available):       Pain Score:   7     Physical Exam  General: Sitting in chair, NAD, antalgic gait with cane and hunched forward    Head: NCAT  Eyes: Sclera/conjunctiva clear, EOMI, PERRL  Nose/mouth: MMM  CV: Good distal pulses  Lungs: Good/equal chest excursion  Abdomen: Soft, ND  Ext: No cyanosis/edema  MSK: L-spine alignment: unremarkable, BL paraspinal m/BL PSIS TTP, L-spine ROM: ext limited by pain    Neuro: AAOx3, CN grossly nl  Dermatome sensation to light touch  LEFT L1 (lower pelvis/upper thigh): WNL    RIGHT L1: WNL      LEFT L2 (upper thigh): WNL       RIGHT: L2:WNL      LEFT L3 (medial knee): WNL       RIGHT L3: WNL      LEFT L4 (superior medial malleolus): WNL       RIGHT L4: decreased      LEFT L5 (dorsal foot): WNL       RIGHT L5: decreased      LEFT S1 (lateral foot): WNL     RIGHT S1: WNL      LEFT S2 (popliteal fossa): WNL    RIGHT S2: WNL        Motor strength  LEFT L2 (hip flexion): 5/5   RIGHT L2: 5/5  LEFT L3 (knee extension): 5/5     RIGHT L3: 4+/5  LEFT L4 (dorsiflexion): 5/5     RIGHT L4: 4/5  LEFT L5  "(EHL extension): 5/5     RIGHT L5: 4/5  LEFT S1 (plantarflexion): 5/5     RIGHT S1: 5/5  LEFT S2 (knee flexion): 5/5      RIGHT S2: 5/5    Psych: affect nl, affect nl  Skin: no rash      REVIEW OF LABORATORY DATA  I have reviewed the following lab results:  WBC   Date Value Ref Range Status   09/20/2024 7.6 4.4 - 11.3 x10*3/uL Final     RBC   Date Value Ref Range Status   09/20/2024 4.06 (L) 4.50 - 5.90 x10*6/uL Final     Hemoglobin   Date Value Ref Range Status   09/20/2024 12.4 (L) 13.5 - 17.5 g/dL Final     Hematocrit   Date Value Ref Range Status   09/20/2024 38.9 (L) 41.0 - 52.0 % Final     MCV   Date Value Ref Range Status   09/20/2024 96 80 - 100 fL Final     MCH   Date Value Ref Range Status   09/20/2024 30.5 26.0 - 34.0 pg Final     MCHC   Date Value Ref Range Status   09/20/2024 31.9 (L) 32.0 - 36.0 g/dL Final     RDW   Date Value Ref Range Status   09/20/2024 13.3 11.5 - 14.5 % Final     Platelets   Date Value Ref Range Status   09/20/2024 222 150 - 450 x10*3/uL Final     MPV   Date Value Ref Range Status   11/22/2022 9.4 7.0 - 12.6 CU Final     Sodium   Date Value Ref Range Status   09/20/2024 136 136 - 145 mmol/L Final     Potassium   Date Value Ref Range Status   09/20/2024 4.9 3.5 - 5.3 mmol/L Final     Bicarbonate   Date Value Ref Range Status   09/20/2024 31 21 - 32 mmol/L Final     Urea Nitrogen   Date Value Ref Range Status   09/20/2024 17 6 - 23 mg/dL Final     Calcium   Date Value Ref Range Status   09/20/2024 8.8 8.6 - 10.3 mg/dL Final     No results found for: \"PROTIME\", \"PTT\", \"INR\", \"FIBRINOGEN\"      REVIEW OF RADIOLOGY   I have reviewed the following:  Radiology Studies           MRI L-spine 7/3/24:         ASSESSMENT & PLAN  Todd Small is a 59 y.o. male with PMH CHF 2/2 former EtOH use, former smoker, CKD, MDD, papillary thyroid ca s/p thyroidectomy/RT with hypothyroidism who presents  for F/U    1) Lumbar spinal stenosis, improved  -LBP radiating down BLE with standing/walking " affecting gait, function, ADLs, and QoL  -Refractive to Tylenol, biofreeze, SNRI, Norco  -NSAIDs contraindicated 2/2 CKD. MDP may worsen rash  -Unable to meaningfully participate in PT 2/2 pain severity and late-stage CHF  -MRI L-spine 7/3/24:  multilevel spondylosis featuring moderate L3-4 stenosis  -BL L3-4 TFESI 11/12/24: 70% relief 3 mo  -Schedule repeat BL L3-4 TFESI to target pain generator as seen on imaging and minimize risk/likelihood of chronic opioid use and/or surgery  -Should PE not improve, consider new MRI     2) Sacroiliitis  -BL SIJ-mediated pain reproduced on multiple SIJ-provocative maneuvers  -Refractive to yrs of conservative tx including Tylenol, biofreeze, Norco  -Awaiting PA for R SIJ fusion. Consider left afterwards:  --CT pelvis 10/15/24: mild BL hip OA, mild BL SIJ OA  --Therapeutic BL SIJ CSI 9/3/24: 100% relief 3 h  --Dx BL SIJ injxn #1 10/29/24: 100% relief 3 d  --Dx BL SIJ injxn #2 1/7/25: 100% relief    3) Long-term opiate use  -2/2 chronic LB and leg pain refractive to conservative, interventional tx  -On Percocet 7.5 mg QID PRN (45 MME) with moderate relief. Denies SE. Requests RF  - reviewed/appropriate. Reviewed DS 1/8/25: OK. No sign of aberrant behavior.   -Educated on/prescribed Narcan 8/12/24 2/2 benzo co-prescription  -RF Percocet x30 d. No dose inc cristian in context of benzo co-use  -F/U 1 mo        Discussed procedure risks/benefits in detail with patient. Pt meets medical necessity for procedure due to failure of conservative measures. Reviewed procedural risks including bleeding, infection, nerve damage, paralysis. Also reviewed mitigating factors such as screening for infection/blood thinner use, sterile precautions, and image-guidance when applicable. All questions answered. Pt/guardian expressed understanding and choose to proceed    Today's visit involved continuation of chronic pain care. In the context of the complexity of this patient's chronic pain diagnosis,  long-term expectations and care planning discussed. Adequate time taken to ensure patient understanding and answer questions. Imaging studies ordered are placed do elucidate the patient's diagnosis, but also to evaluate the patient's candidacy for procedural and surgical interventions. The risks and benefits of these potential interventions are detailed as above.                    Nathaly Cantrell MD  Anesthesiologist & Interventional Pain Physician   [facility]  O: [phone]  F: [phone]  8:47 AM  02/10/25

## 2025-02-17 ENCOUNTER — TELEPHONE (OUTPATIENT)
Dept: PAIN MEDICINE | Facility: CLINIC | Age: 60
End: 2025-02-17
Payer: COMMERCIAL

## 2025-02-17 ENCOUNTER — PREP FOR PROCEDURE (OUTPATIENT)
Dept: PAIN MEDICINE | Facility: CLINIC | Age: 60
End: 2025-02-17
Payer: COMMERCIAL

## 2025-02-17 DIAGNOSIS — M46.1 SACROILIITIS, NOT ELSEWHERE CLASSIFIED (CMS-HCC): Primary | ICD-10-CM

## 2025-02-17 PROCEDURE — RXMED WILLOW AMBULATORY MEDICATION CHARGE

## 2025-02-17 RX ORDER — CEFAZOLIN SODIUM 2 G/100ML
2 INJECTION, SOLUTION INTRAVENOUS ONCE
OUTPATIENT
Start: 2025-02-17 | End: 2025-02-17

## 2025-02-17 NOTE — TELEPHONE ENCOUNTER
Pt calls stating he wants to go off xanax and asking for an increase in Percocet. States he is in pain and is willing to discontinue Xanax.    Call to patient. He keeps saying he can't her me and wants to do a TELEV to talk to the doctor.  Please advise.

## 2025-02-20 ENCOUNTER — TELEPHONE (OUTPATIENT)
Dept: PAIN MEDICINE | Facility: CLINIC | Age: 60
End: 2025-02-20
Payer: COMMERCIAL

## 2025-02-20 NOTE — TELEPHONE ENCOUNTER
P2p set up for Dr. Alanis to call Dr. Cantrell the afternoon of 2/20 or morning of 2/21 for the denial of TFESI

## 2025-02-24 NOTE — TELEPHONE ENCOUNTER
Before patient is scheduled earlier with [facility]  does [facility] have ability to increase Percocet prescription? And he should discontinue the April Xanax prescription?

## 2025-02-26 ENCOUNTER — PHARMACY VISIT (OUTPATIENT)
Dept: PHARMACY | Facility: CLINIC | Age: 60
End: 2025-02-26
Payer: MEDICAID

## 2025-02-26 ENCOUNTER — PATIENT MESSAGE (OUTPATIENT)
Dept: PRIMARY CARE | Facility: CLINIC | Age: 60
End: 2025-02-26
Payer: COMMERCIAL

## 2025-02-26 DIAGNOSIS — F41.9 ANXIETY: Primary | ICD-10-CM

## 2025-03-03 ENCOUNTER — OFFICE VISIT (OUTPATIENT)
Dept: PAIN MEDICINE | Facility: CLINIC | Age: 60
End: 2025-03-03
Payer: COMMERCIAL

## 2025-03-03 ENCOUNTER — TELEPHONE (OUTPATIENT)
Dept: CARDIOLOGY | Facility: CLINIC | Age: 60
End: 2025-03-03

## 2025-03-03 VITALS — SYSTOLIC BLOOD PRESSURE: 132 MMHG | OXYGEN SATURATION: 95 % | DIASTOLIC BLOOD PRESSURE: 80 MMHG | HEART RATE: 92 BPM

## 2025-03-03 DIAGNOSIS — M54.16 LUMBAR RADICULOPATHY: ICD-10-CM

## 2025-03-03 DIAGNOSIS — M46.1 SACROILIITIS, NOT ELSEWHERE CLASSIFIED (CMS-HCC): ICD-10-CM

## 2025-03-03 DIAGNOSIS — F41.9 ANXIETY: Primary | ICD-10-CM

## 2025-03-03 DIAGNOSIS — Z79.891 LONG TERM CURRENT USE OF OPIATE ANALGESIC: ICD-10-CM

## 2025-03-03 PROCEDURE — 3075F SYST BP GE 130 - 139MM HG: CPT | Performed by: ANESTHESIOLOGY

## 2025-03-03 PROCEDURE — 99214 OFFICE O/P EST MOD 30 MIN: CPT | Performed by: ANESTHESIOLOGY

## 2025-03-03 PROCEDURE — 3079F DIAST BP 80-89 MM HG: CPT | Performed by: ANESTHESIOLOGY

## 2025-03-03 RX ORDER — OXYCODONE AND ACETAMINOPHEN 7.5; 325 MG/1; MG/1
1 TABLET ORAL 4 TIMES DAILY PRN
Qty: 32 TABLET | Refills: 0 | Status: SHIPPED | OUTPATIENT
Start: 2025-03-12 | End: 2025-03-05 | Stop reason: SDUPTHER

## 2025-03-03 RX ORDER — ALPRAZOLAM 0.25 MG/1
TABLET ORAL
Qty: 91 TABLET | Refills: 0 | Status: SHIPPED | OUTPATIENT
Start: 2025-03-03 | End: 2025-04-02

## 2025-03-03 ASSESSMENT — ENCOUNTER SYMPTOMS
RESPIRATORY NEGATIVE: 1
PSYCHIATRIC NEGATIVE: 1
OCCASIONAL FEELINGS OF UNSTEADINESS: 0
NUMBNESS: 1
EYES NEGATIVE: 1
DEPRESSION: 0
WEAKNESS: 1
CARDIOVASCULAR NEGATIVE: 1
HEMATOLOGIC/LYMPHATIC NEGATIVE: 1
CONSTITUTIONAL NEGATIVE: 1
BACK PAIN: 1
ENDOCRINE NEGATIVE: 1
LOSS OF SENSATION IN FEET: 0
GASTROINTESTINAL NEGATIVE: 1

## 2025-03-03 ASSESSMENT — PAIN - FUNCTIONAL ASSESSMENT: PAIN_FUNCTIONAL_ASSESSMENT: 0-10

## 2025-03-03 ASSESSMENT — PAIN SCALES - GENERAL
PAINLEVEL_OUTOF10: 8
PAINLEVEL_OUTOF10: 8

## 2025-03-03 ASSESSMENT — PAIN DESCRIPTION - DESCRIPTORS: DESCRIPTORS: ACHING;SHARP

## 2025-03-03 NOTE — PROGRESS NOTES
PAIN MANAGEMENT FOLLOW-UP OFFICE NOTE    Date of Service: 3/3/2025    SUBJECTIVE    CHIEF COMPLAINT: LBP    HISTORY OF PRESENT ILLNESS    Todd Small is a 59 y.o. male with PMH CHF 2/2 former EtOH use, former smoker, CKD, MDD, papillary thyroid ca s/p thyroidectomy/RT with hypothyroidism who presents for F/U    Pt was taking Xanax 1 mg TID, occasionally QID, for anxiety. Pt wished to stop Xanax due to wish to reduce polypharmacy and risk with opioids. Last dose was 2/27 after which he ran out. Claims he destroyed much of Xanax and presents empty bottle. Denies feeling worse anxiety, CP, HA.  Claims R shoulder cramps, mild nausea. Was seeing Dr Otero for this, but pt claims he was discharged from practice, but he is not sure why. In meantime, he has BL L3-4 TFESI scheduled tomorrow. Taking Percocet 7.5 mg QID PRN and asks for dose adjustment to help control his back pain.      Pt denies new-onset bowel/bladder incontinence.  Pt denies recent infection, allergy to Latex/iodine/contrast. Patient is currently taking the following blood thinner(s): N/A    Procedure log:  -Dx BL SIJ injxn #2 1/7/25: 100% relief  -BL L3-4 TFESI 11/12/24: 70% relief 3 mo  -Dx BL SIJ injxn #1 10/29/24: 100% relief 3 d  -BL SIJ  CSI 9/3/24: 100% relief 3 hours before return to baseline   -BL L3-4 TFESI 7/30/24: 50% relief in his legs, but 30% relief in LB      REVIEW OF SYSTEMS  Review of Systems   Constitutional: Negative.    HENT: Negative.     Eyes: Negative.    Respiratory: Negative.     Cardiovascular: Negative.    Gastrointestinal: Negative.    Endocrine: Negative.    Musculoskeletal:  Positive for back pain.   Skin: Negative.    Neurological:  Positive for weakness and numbness.   Hematological: Negative.    Psychiatric/Behavioral: Negative.         PAST MEDICAL HISTORY  Past Medical History:   Diagnosis Date    Anxiety 4/01/23    Back pain     Cancer (Multi) 10/01/0-    CHF (congestive heart failure) 10/01/23    Depression  10/01/23    Extremity pain     Headache     Hypothyroidism     Low back pain     Thyroid cancer (Multi)      Past Surgical History:   Procedure Laterality Date    APPENDECTOMY  10/01/96    CARDIAC CATHETERIZATION      COLONOSCOPY  09/11/2020    HERNIA REPAIR      THYROIDECTOMY      VASECTOMY       Family History   Problem Relation Name Age of Onset    Heart disease Mother Kim     Heart disease Sister (1)     Other (pacemaker) Sister (1)     Heart disease Brother Skye     Atrial fibrillation Brother Gilberto        CURRENT MEDICATIONS  Current Outpatient Medications   Medication Sig Dispense Refill    ALPRAZolam (Xanax) 1 mg tablet Take 1 tablet by mouth in the morning, 1 tablet at midday, and 1-2 tablets at bedtime as needed 360 tablet 0    ALPRAZolam (Xanax) 1 mg tablet Take 1 tablet by mouth in the morning, 1 tablets at midday, and 1-2 tablets at bedtime as needed 360 tablet 0    [START ON 4/8/2025] ALPRAZolam (Xanax) 1 mg tablet Take 1 oral tablet in the morning, 1 tablets at midday, and 1-2 tablets at bedtime as needed 120 tablet 0    amLODIPine (Norvasc) 5 mg tablet Take 1 tablet (5 mg) by mouth once daily. 90 tablet 3    carvedilol (Coreg) 12.5 mg tablet TAKE 1 TABLET BY MOUTH TWO TIMES A DAY WITH FOOD 180 tablet 3    clotrimazole-betamethasone (Lotrisone) cream Apply 1 Application topically 2 times a day for 28 days. 45 g 1    furosemide (Lasix) 40 mg tablet TAKE ONE TABLET BY MOUTH ONCE A DAY 90 tablet 3    levothyroxine (Synthroid) 150 mcg tablet Take 1 tablet (150 mcg) by mouth on MONDAY through SATURDAY, and take 1 AND 1/2 tablets on SUNDAY as directed 96 tablet 1    lidocaine (Aspercreme, lidocaine,) 4 % patch Unwrap and apply 1 patch every 12 hours if needed (low back pain). Remove & discard patch within 12 hours or as directed by MD. 30 patch 2    naloxone (Narcan) 4 mg/0.1 mL nasal spray Administer 1 spray (4 mg) into affected nostril(s) if needed for opioid reversal. May repeat every 2-3 minutes if  needed, alternating nostrils, until medical assistance becomes available. 2 each 0    nystatin (Mycostatin) 100,000 unit/gram powder Apply 1 Application topically 2 times a day. 60 g 1    oxyCODONE-acetaminophen (Percocet) 7.5-325 mg tablet Take 1 tablet by mouth 4 times a day as needed for severe pain (7 - 10). 120 tablet 0    potassium chloride CR (Klor-Con) 10 mEq ER tablet Take 1 tablet (10 mEq) by mouth 2 times a day. 180 tablet 3    sacubitriL-valsartan (Entresto)  mg tablet Take 1 tablet by mouth 2 times a day. 180 tablet 3    venlafaxine XR (Effexor-XR) 150 mg 24 hr capsule Take 1 oral tablet once a day (take with 75mg capsule for a total of 225mg daily) 30 capsule 0    venlafaxine XR (Effexor-XR) 150 mg 24 hr capsule Take 1 oral tablet once a day (take with 75mg capsule for a total of 225mg daily) 30 capsule 0    venlafaxine XR (Effexor-XR) 75 mg 24 hr capsule Take one capsule by mouth once a day (take with 150mg capsule for a total of 225mg daily) 90 capsule 0    venlafaxine XR (Effexor-XR) 75 mg 24 hr capsule Take one oral capsule once a day (take with 150mg capsule for a total of 225mg daily) 30 capsule 0    venlafaxine XR (Effexor-XR) 75 mg 24 hr capsule Take one oral capsule once a day (take with 150mg capsule for a total of 225mg daily) 30 capsule 0     No current facility-administered medications for this visit.       ALLERGIES AND DRUG REACTIONS  Allergies   Allergen Reactions    Fluconazole Confusion     PT takes alprazolam as well.  Last fill pt (Jan 25) stated he was disoriented, confused with additional side effects.   Cant not take both together    Latex Rash    Sulfa (Sulfonamide Antibiotics) Rash and Unknown          OBJECTIVE  Visit Vitals  /80   Pulse 92   SpO2 95%   Smoking Status Former       Last Recorded Pain Score (if available):       Pain Score:   8     Physical Exam  General: Sitting in chair, NAD, antalgic gait with cane and hunched forward    Head: NCAT  Eyes:  "Sclera/conjunctiva clear, EOMI, PERRL  Nose/mouth: MMM  CV: Good distal pulses  Lungs: Good/equal chest excursion  Abdomen: Soft, ND  Ext: No cyanosis/edema  MSK: L-spine alignment: unremarkable, BL paraspinal m/BL PSIS TTP, L-spine ROM: ext limited by pain    Neuro: AAOx3, CN grossly nl    Psych: affect nl, affect nl  Skin: no rash      REVIEW OF LABORATORY DATA  I have reviewed the following lab results:  WBC   Date Value Ref Range Status   09/20/2024 7.6 4.4 - 11.3 x10*3/uL Final     RBC   Date Value Ref Range Status   09/20/2024 4.06 (L) 4.50 - 5.90 x10*6/uL Final     Hemoglobin   Date Value Ref Range Status   09/20/2024 12.4 (L) 13.5 - 17.5 g/dL Final     Hematocrit   Date Value Ref Range Status   09/20/2024 38.9 (L) 41.0 - 52.0 % Final     MCV   Date Value Ref Range Status   09/20/2024 96 80 - 100 fL Final     MCH   Date Value Ref Range Status   09/20/2024 30.5 26.0 - 34.0 pg Final     MCHC   Date Value Ref Range Status   09/20/2024 31.9 (L) 32.0 - 36.0 g/dL Final     RDW   Date Value Ref Range Status   09/20/2024 13.3 11.5 - 14.5 % Final     Platelets   Date Value Ref Range Status   09/20/2024 222 150 - 450 x10*3/uL Final     MPV   Date Value Ref Range Status   11/22/2022 9.4 7.0 - 12.6 CU Final     Sodium   Date Value Ref Range Status   09/20/2024 136 136 - 145 mmol/L Final     Potassium   Date Value Ref Range Status   09/20/2024 4.9 3.5 - 5.3 mmol/L Final     Bicarbonate   Date Value Ref Range Status   09/20/2024 31 21 - 32 mmol/L Final     Urea Nitrogen   Date Value Ref Range Status   09/20/2024 17 6 - 23 mg/dL Final     Calcium   Date Value Ref Range Status   09/20/2024 8.8 8.6 - 10.3 mg/dL Final     No results found for: \"PROTIME\", \"PTT\", \"INR\", \"FIBRINOGEN\"      REVIEW OF RADIOLOGY   I have reviewed the following:  Radiology Studies           MRI L-spine 7/3/24:         ASSESSMENT & PLAN  Todd Small is a 59 y.o. male with PMH CHF 2/2 former EtOH use, former smoker, CKD, MDD, papillary thyroid ca s/p " thyroidectomy/RT with hypothyroidism who presents  for F/U    1) Lumbar spinal stenosis, improved  -LBP radiating down BLE with standing/walking affecting gait, function, ADLs, and QoL  -Refractive to Tylenol, biofreeze, SNRI, Norco  -NSAIDs contraindicated 2/2 CKD. MDP may worsen rash  -Unable to meaningfully participate in PT 2/2 pain severity and late-stage CHF  -MRI L-spine 7/3/24:  multilevel spondylosis featuring moderate L3-4 stenosis  -BL L3-4 TFESI 11/12/24: 70% relief 3 mo  -s/f repeat BL L3-4 TFESI tomorrow target pain generator as seen on imaging and minimize risk/likelihood of chronic opioid use and/or surgery  -Consider new MRI if no improvement    2) Sacroiliitis  -BL SIJ-mediated pain reproduced on multiple SIJ-provocative maneuvers  -Refractive to yrs of conservative tx including Tylenol, biofreeze, Norco  -s/f R SIJ fusion 3'20. Consider left afterwards:  --CT pelvis 10/15/24: mild BL hip OA, mild BL SIJ OA  --Therapeutic BL SIJ CSI 9/3/24: 100% relief 3 h  --Dx BL SIJ injxn #1 10/29/24: 100% relief 3 d  --Dx BL SIJ injxn #2 1/7/25: 100% relief    3) Xanax use  -2/2 anxiety, pt takes Xanax 1 mg TID- occasionally QID.   -Reportedly self-Dc'd 2/27 in wanting to lessen risk with opioids and reduce his polypharmacy, which is understandable, but he reportedly destroyed his remaining supply to help reach his goal. VSS, mild subj nausea, m cramping- unclear if withdrawal imminent. Also notes DC from psych office, but cannot elaborate why  -Advised pt not to suddenly discontinue Xanax due to risk of potentially lethal withdrawal. Discussed will provide 1-time weaning schedule as pt no longer has Xanax and does not have a prescribing provider.  -Pharmacist Carrillo Wise called to clarify Xanax script and concerns of apparently early RF- clarified this was one-time and final weaning script with 0.25 mg tabs as opposed to pt's usual 1 mg tabs in context of pt's desire to stop Xanax use long-term. Pharmacist  expressed understanding and reports he would repeat risk/benefit conversation with pt. All questions answered.    4) Long-term opiate use  -2/2 chronic LB and leg pain refractive to conservative, interventional tx  -On Percocet 7.5 mg QID PRN (45 MME) with moderate relief. Denies SE. Requests RF  - reviewed/appropriate. Reviewed DS 1/8/25: OK. No sign of aberrant behavior.  Consider UDS next visit  -Educated on/prescribed Narcan 8/12/24 2/2 benzo co-prescription  -RF Percocet until SIF 3/20. No dose inc cristian in context of benzo co-use            Today's visit involved continuation of chronic pain care. In the context of the complexity of this patient's chronic pain diagnosis, long-term expectations and care planning discussed. Adequate time taken to ensure patient understanding and answer questions. Imaging studies ordered are placed do elucidate the patient's diagnosis, but also to evaluate the patient's candidacy for procedural and surgical interventions. The risks and benefits of these potential interventions are detailed as above.                    Nathaly Cantrell MD  Anesthesiologist & Interventional Pain Physician   [facility] [place]  O: [phone]  F: [phone]  3:08 PM  03/03/25

## 2025-03-03 NOTE — H&P (VIEW-ONLY)
PAIN MANAGEMENT FOLLOW-UP OFFICE NOTE    Date of Service: 3/3/2025    SUBJECTIVE    CHIEF COMPLAINT: LBP    HISTORY OF PRESENT ILLNESS    Todd Small is a 59 y.o. male with PMH CHF 2/2 former EtOH use, former smoker, CKD, MDD, papillary thyroid ca s/p thyroidectomy/RT with hypothyroidism who presents for F/U    Pt was taking Xanax 1 mg TID, occasionally QID, for anxiety. Pt wished to stop Xanax due to wish to reduce polypharmacy and risk with opioids. Last dose was 2/27 after which he ran out. Claims he destroyed much of Xanax and presents empty bottle. Denies feeling worse anxiety, CP, HA.  Claims R shoulder cramps, mild nausea. Was seeing Dr Otero for this, but pt claims he was discharged from practice, but he is not sure why. In meantime, he has BL L3-4 TFESI scheduled tomorrow. Taking Percocet 7.5 mg QID PRN and asks for dose adjustment to help control his back pain.      Pt denies new-onset bowel/bladder incontinence.  Pt denies recent infection, allergy to Latex/iodine/contrast. Patient is currently taking the following blood thinner(s): N/A    Procedure log:  -Dx BL SIJ injxn #2 1/7/25: 100% relief  -BL L3-4 TFESI 11/12/24: 70% relief 3 mo  -Dx BL SIJ injxn #1 10/29/24: 100% relief 3 d  -BL SIJ  CSI 9/3/24: 100% relief 3 hours before return to baseline   -BL L3-4 TFESI 7/30/24: 50% relief in his legs, but 30% relief in LB      REVIEW OF SYSTEMS  Review of Systems   Constitutional: Negative.    HENT: Negative.     Eyes: Negative.    Respiratory: Negative.     Cardiovascular: Negative.    Gastrointestinal: Negative.    Endocrine: Negative.    Musculoskeletal:  Positive for back pain.   Skin: Negative.    Neurological:  Positive for weakness and numbness.   Hematological: Negative.    Psychiatric/Behavioral: Negative.         PAST MEDICAL HISTORY  Past Medical History:   Diagnosis Date    Anxiety 4/01/23    Back pain     Cancer (Multi) 10/01/0-    CHF (congestive heart failure) 10/01/23    Depression  10/01/23    Extremity pain     Headache     Hypothyroidism     Low back pain     Thyroid cancer (Multi)      Past Surgical History:   Procedure Laterality Date    APPENDECTOMY  10/01/96    CARDIAC CATHETERIZATION      COLONOSCOPY  09/11/2020    HERNIA REPAIR      THYROIDECTOMY      VASECTOMY       Family History   Problem Relation Name Age of Onset    Heart disease Mother Kim     Heart disease Sister (1)     Other (pacemaker) Sister (1)     Heart disease Brother Skye     Atrial fibrillation Brother Gilberto        CURRENT MEDICATIONS  Current Outpatient Medications   Medication Sig Dispense Refill    ALPRAZolam (Xanax) 1 mg tablet Take 1 tablet by mouth in the morning, 1 tablet at midday, and 1-2 tablets at bedtime as needed 360 tablet 0    ALPRAZolam (Xanax) 1 mg tablet Take 1 tablet by mouth in the morning, 1 tablets at midday, and 1-2 tablets at bedtime as needed 360 tablet 0    [START ON 4/8/2025] ALPRAZolam (Xanax) 1 mg tablet Take 1 oral tablet in the morning, 1 tablets at midday, and 1-2 tablets at bedtime as needed 120 tablet 0    amLODIPine (Norvasc) 5 mg tablet Take 1 tablet (5 mg) by mouth once daily. 90 tablet 3    carvedilol (Coreg) 12.5 mg tablet TAKE 1 TABLET BY MOUTH TWO TIMES A DAY WITH FOOD 180 tablet 3    clotrimazole-betamethasone (Lotrisone) cream Apply 1 Application topically 2 times a day for 28 days. 45 g 1    furosemide (Lasix) 40 mg tablet TAKE ONE TABLET BY MOUTH ONCE A DAY 90 tablet 3    levothyroxine (Synthroid) 150 mcg tablet Take 1 tablet (150 mcg) by mouth on MONDAY through SATURDAY, and take 1 AND 1/2 tablets on SUNDAY as directed 96 tablet 1    lidocaine (Aspercreme, lidocaine,) 4 % patch Unwrap and apply 1 patch every 12 hours if needed (low back pain). Remove & discard patch within 12 hours or as directed by MD. 30 patch 2    naloxone (Narcan) 4 mg/0.1 mL nasal spray Administer 1 spray (4 mg) into affected nostril(s) if needed for opioid reversal. May repeat every 2-3 minutes if  needed, alternating nostrils, until medical assistance becomes available. 2 each 0    nystatin (Mycostatin) 100,000 unit/gram powder Apply 1 Application topically 2 times a day. 60 g 1    oxyCODONE-acetaminophen (Percocet) 7.5-325 mg tablet Take 1 tablet by mouth 4 times a day as needed for severe pain (7 - 10). 120 tablet 0    potassium chloride CR (Klor-Con) 10 mEq ER tablet Take 1 tablet (10 mEq) by mouth 2 times a day. 180 tablet 3    sacubitriL-valsartan (Entresto)  mg tablet Take 1 tablet by mouth 2 times a day. 180 tablet 3    venlafaxine XR (Effexor-XR) 150 mg 24 hr capsule Take 1 oral tablet once a day (take with 75mg capsule for a total of 225mg daily) 30 capsule 0    venlafaxine XR (Effexor-XR) 150 mg 24 hr capsule Take 1 oral tablet once a day (take with 75mg capsule for a total of 225mg daily) 30 capsule 0    venlafaxine XR (Effexor-XR) 75 mg 24 hr capsule Take one capsule by mouth once a day (take with 150mg capsule for a total of 225mg daily) 90 capsule 0    venlafaxine XR (Effexor-XR) 75 mg 24 hr capsule Take one oral capsule once a day (take with 150mg capsule for a total of 225mg daily) 30 capsule 0    venlafaxine XR (Effexor-XR) 75 mg 24 hr capsule Take one oral capsule once a day (take with 150mg capsule for a total of 225mg daily) 30 capsule 0     No current facility-administered medications for this visit.       ALLERGIES AND DRUG REACTIONS  Allergies   Allergen Reactions    Fluconazole Confusion     PT takes alprazolam as well.  Last fill pt (Jan 25) stated he was disoriented, confused with additional side effects.   Cant not take both together    Latex Rash    Sulfa (Sulfonamide Antibiotics) Rash and Unknown          OBJECTIVE  Visit Vitals  /80   Pulse 92   SpO2 95%   Smoking Status Former       Last Recorded Pain Score (if available):       Pain Score:   8     Physical Exam  General: Sitting in chair, NAD, antalgic gait with cane and hunched forward    Head: NCAT  Eyes:  "Sclera/conjunctiva clear, EOMI, PERRL  Nose/mouth: MMM  CV: Good distal pulses  Lungs: Good/equal chest excursion  Abdomen: Soft, ND  Ext: No cyanosis/edema  MSK: L-spine alignment: unremarkable, BL paraspinal m/BL PSIS TTP, L-spine ROM: ext limited by pain    Neuro: AAOx3, CN grossly nl    Psych: affect nl, affect nl  Skin: no rash      REVIEW OF LABORATORY DATA  I have reviewed the following lab results:  WBC   Date Value Ref Range Status   09/20/2024 7.6 4.4 - 11.3 x10*3/uL Final     RBC   Date Value Ref Range Status   09/20/2024 4.06 (L) 4.50 - 5.90 x10*6/uL Final     Hemoglobin   Date Value Ref Range Status   09/20/2024 12.4 (L) 13.5 - 17.5 g/dL Final     Hematocrit   Date Value Ref Range Status   09/20/2024 38.9 (L) 41.0 - 52.0 % Final     MCV   Date Value Ref Range Status   09/20/2024 96 80 - 100 fL Final     MCH   Date Value Ref Range Status   09/20/2024 30.5 26.0 - 34.0 pg Final     MCHC   Date Value Ref Range Status   09/20/2024 31.9 (L) 32.0 - 36.0 g/dL Final     RDW   Date Value Ref Range Status   09/20/2024 13.3 11.5 - 14.5 % Final     Platelets   Date Value Ref Range Status   09/20/2024 222 150 - 450 x10*3/uL Final     MPV   Date Value Ref Range Status   11/22/2022 9.4 7.0 - 12.6 CU Final     Sodium   Date Value Ref Range Status   09/20/2024 136 136 - 145 mmol/L Final     Potassium   Date Value Ref Range Status   09/20/2024 4.9 3.5 - 5.3 mmol/L Final     Bicarbonate   Date Value Ref Range Status   09/20/2024 31 21 - 32 mmol/L Final     Urea Nitrogen   Date Value Ref Range Status   09/20/2024 17 6 - 23 mg/dL Final     Calcium   Date Value Ref Range Status   09/20/2024 8.8 8.6 - 10.3 mg/dL Final     No results found for: \"PROTIME\", \"PTT\", \"INR\", \"FIBRINOGEN\"      REVIEW OF RADIOLOGY   I have reviewed the following:  Radiology Studies           MRI L-spine 7/3/24:         ASSESSMENT & PLAN  Todd Small is a 59 y.o. male with PMH CHF 2/2 former EtOH use, former smoker, CKD, MDD, papillary thyroid ca s/p " thyroidectomy/RT with hypothyroidism who presents  for F/U    1) Lumbar spinal stenosis, improved  -LBP radiating down BLE with standing/walking affecting gait, function, ADLs, and QoL  -Refractive to Tylenol, biofreeze, SNRI, Norco  -NSAIDs contraindicated 2/2 CKD. MDP may worsen rash  -Unable to meaningfully participate in PT 2/2 pain severity and late-stage CHF  -MRI L-spine 7/3/24:  multilevel spondylosis featuring moderate L3-4 stenosis  -BL L3-4 TFESI 11/12/24: 70% relief 3 mo  -s/f repeat BL L3-4 TFESI tomorrow target pain generator as seen on imaging and minimize risk/likelihood of chronic opioid use and/or surgery  -Consider new MRI if no improvement    2) Sacroiliitis  -BL SIJ-mediated pain reproduced on multiple SIJ-provocative maneuvers  -Refractive to yrs of conservative tx including Tylenol, biofreeze, Norco  -s/f R SIJ fusion 3'20. Consider left afterwards:  --CT pelvis 10/15/24: mild BL hip OA, mild BL SIJ OA  --Therapeutic BL SIJ CSI 9/3/24: 100% relief 3 h  --Dx BL SIJ injxn #1 10/29/24: 100% relief 3 d  --Dx BL SIJ injxn #2 1/7/25: 100% relief    3) Xanax use  -2/2 anxiety, pt takes Xanax 1 mg TID- occasionally QID.   -Reportedly self-Dc'd 2/27 in wanting to lessen risk with opioids and reduce his polypharmacy, which is understandable, but he reportedly destroyed his remaining supply to help reach his goal. VSS, mild subj nausea, m cramping- unclear if withdrawal imminent. Also notes DC from psych office, but cannot elaborate why  -Advised pt not to suddenly discontinue Xanax due to risk of potentially lethal withdrawal. Discussed will provide 1-time weaning schedule as pt no longer has Xanax and does not have a prescribing provider.  -Pharmacist Carrillo Wise called to clarify Xanax script and concerns of apparently early RF- clarified this was one-time and final weaning script with 0.25 mg tabs as opposed to pt's usual 1 mg tabs in context of pt's desire to stop Xanax use long-term. Pharmacist  expressed understanding and reports he would repeat risk/benefit conversation with pt. All questions answered.    4) Long-term opiate use  -2/2 chronic LB and leg pain refractive to conservative, interventional tx  -On Percocet 7.5 mg QID PRN (45 MME) with moderate relief. Denies SE. Requests RF  - reviewed/appropriate. Reviewed DS 1/8/25: OK. No sign of aberrant behavior.  Consider UDS next visit  -Educated on/prescribed Narcan 8/12/24 2/2 benzo co-prescription  -RF Percocet until SIF 3/20. No dose inc cristian in context of benzo co-use            Today's visit involved continuation of chronic pain care. In the context of the complexity of this patient's chronic pain diagnosis, long-term expectations and care planning discussed. Adequate time taken to ensure patient understanding and answer questions. Imaging studies ordered are placed do elucidate the patient's diagnosis, but also to evaluate the patient's candidacy for procedural and surgical interventions. The risks and benefits of these potential interventions are detailed as above.                    Nathaly Cantrell MD  Anesthesiologist & Interventional Pain Physician   [facility] [place]  O: [phone]  F: [phone]  3:08 PM  03/03/25

## 2025-03-03 NOTE — TELEPHONE ENCOUNTER
Patient called the office today stating that he is to have back surgery on 3/20.  He states his BP is 170/100.  He is asking if his meds can be adjusted for this?  Please advise, thanks  Call back [phone]

## 2025-03-03 NOTE — TELEPHONE ENCOUNTER
MIRIAN CALLED TODAY AND STATED HE NEEDS YOU TO PRESCRIBE HIS APRAZOLAM THAT IS AT THE [facility] .  pHONE NUMBER IS  [phone].  Mirian STATES THAT THE PHARMACIST NEEDS TO SPEAK TO YOU BEFORE THEY WILL FILL IT. HE  SAID HE HAD THE SCRIPT TRANSFERRED  FROM [facility] AND REALLY NEEDS FOR YOU TO CALL THE PHARMACIST.  hE HAS AN APPOINTMENT WITH YOU TODAY AT [facility] AND NEEDS TO PICK IT UP BEFORE HIS APPOINTMENT.

## 2025-03-04 ENCOUNTER — ANCILLARY PROCEDURE (OUTPATIENT)
Dept: RADIOLOGY | Facility: EXTERNAL LOCATION | Age: 60
End: 2025-03-04
Payer: COMMERCIAL

## 2025-03-04 ENCOUNTER — TELEPHONE (OUTPATIENT)
Dept: PAIN MEDICINE | Facility: CLINIC | Age: 60
End: 2025-03-04

## 2025-03-04 DIAGNOSIS — M54.16 LUMBAR RADICULOPATHY: ICD-10-CM

## 2025-03-04 DIAGNOSIS — M54.16 RADICULOPATHY, LUMBAR REGION: ICD-10-CM

## 2025-03-04 PROCEDURE — RXMED WILLOW AMBULATORY MEDICATION CHARGE

## 2025-03-04 PROCEDURE — 64483 NJX AA&/STRD TFRM EPI L/S 1: CPT | Performed by: ANESTHESIOLOGY

## 2025-03-04 NOTE — TELEPHONE ENCOUNTER
Pt called back wanting his 3/12/2025 Percocet to go to [facility] pharmacy instead of  [facility]

## 2025-03-04 NOTE — PROGRESS NOTES
OPERATIVE NOTE  Preprocedure diagnosis: Lumbar radic  Postprocedure diagnosis Lumbar radic    Procedure performed: BL L3-4 TFESI  Physician: Nathaly Cantrell MD  Anesthesia: Local  Complications: none  Blood loss:  Nil    Clinical note: This is a very pleasant  59 y.o. male with PMH CHF 2/2 former EtOH use, former smoker, CKD, MDD, papillary thyroid ca s/p thyroidectomy/RT with hypothyroidism who suffers with LBP here meeting all medical criteria for above-mentioned procedure. Patient's pain stable and persistent from last visit.  Denies allergies to steroids, local anesthetics, or iodine/contrast. Denies being on blood thinners. Not diabetic.  Denies fever, chills, NS, CP, SOB, cough, N/V.    Discussed procedure risks/benefits in detail with patient. Pt meets medical necessity for procedure due to failure of conservative measures. Reviewed procedural risks including bleeding, infection, nerve damage, paralysis. Also reviewed mitigating factors such as screening for infection/blood thinner use, sterile precautions, and image-guidance when applicable. All questions answered. Pt/guardian expressed understanding and choose to proceed      Procedure:    PROCEDURE  Bilateral L3-4 transforaminal epidural steroid injection    LOCATION: [facility]  Anesthesia: Local    Informed consent completed.  Patient brought to procedure suite and positioned on table prone.  Lower back prepped with chlorhexidine and draped in sterile fashion.  Fluoroscopy utilized to identify L3-4 level.  C-arm was obliqued to left side to visualize L3-4 foramen.  1.5 inch 25-gauge needle used to administer 1% lidocaine for local anesthesia.  Then, 3.5 inch 22-gauge needle were advanced coaxially to superolateral aspect of foramina via x-ray guidance.  AP view utilized to visualize needles approaching junction of the transverse process and vertebral body.  Lateral view taken to visualize needles just anterior to posterior spinal column.  Needle was  then advanced slightly and after negative add aspiration, 1.5 cc Omnipaque 240 administered at each level revealing adequate epidural spread without vascular uptake.  AP view taken to confirm epidural spread medially without vascular uptake.     After negative aspiration, 7.5 mg dexamethasone +1.25  cc preservative-free saline injected.      Procedure was then repeated in similar fashion on the right side without issue.       Total Injectate: 15 mg dexamethasone + 2.5 ml PF NS    Needles re-styleted and removed.  Hemostasis achieved.  Band-Aids placed.      Sedation: N/A    Patient tolerated procedure well and without issue.  They were discharged home in stable condition.      Nathaly Cantrell MD  Anesthesiologist & Interventional Pain Physician   [facility]  O: [phone]  F: [phone]  7:57 AM  03/04/25

## 2025-03-05 ENCOUNTER — PHARMACY VISIT (OUTPATIENT)
Dept: PHARMACY | Facility: CLINIC | Age: 60
End: 2025-03-05
Payer: MEDICAID

## 2025-03-05 ENCOUNTER — APPOINTMENT (OUTPATIENT)
Dept: PAIN MEDICINE | Facility: CLINIC | Age: 60
End: 2025-03-05
Payer: COMMERCIAL

## 2025-03-05 RX ORDER — OXYCODONE AND ACETAMINOPHEN 7.5; 325 MG/1; MG/1
1 TABLET ORAL 4 TIMES DAILY PRN
Qty: 32 TABLET | Refills: 0 | Status: SHIPPED | OUTPATIENT
Start: 2025-03-12 | End: 2025-03-20

## 2025-03-06 ENCOUNTER — LAB (OUTPATIENT)
Dept: LAB | Facility: HOSPITAL | Age: 60
End: 2025-03-06
Payer: COMMERCIAL

## 2025-03-06 ENCOUNTER — PRE-ADMISSION TESTING (OUTPATIENT)
Dept: PREADMISSION TESTING | Facility: HOSPITAL | Age: 60
End: 2025-03-06
Payer: COMMERCIAL

## 2025-03-06 VITALS
TEMPERATURE: 98.6 F | BODY MASS INDEX: 33.19 KG/M2 | HEIGHT: 71 IN | DIASTOLIC BLOOD PRESSURE: 97 MMHG | WEIGHT: 237.1 LBS | OXYGEN SATURATION: 94 % | HEART RATE: 92 BPM | SYSTOLIC BLOOD PRESSURE: 151 MMHG

## 2025-03-06 DIAGNOSIS — I10 PRIMARY HYPERTENSION: ICD-10-CM

## 2025-03-06 DIAGNOSIS — Z01.818 PRE-OP EXAMINATION: Primary | ICD-10-CM

## 2025-03-06 DIAGNOSIS — Z01.818 ENCOUNTER FOR OTHER PREPROCEDURAL EXAMINATION: Primary | ICD-10-CM

## 2025-03-06 LAB
ANION GAP SERPL CALCULATED.3IONS-SCNC: 13 MMOL/L (ref 10–20)
ATRIAL RATE: 83 BPM
BASOPHILS # BLD AUTO: 0.03 X10*3/UL (ref 0–0.1)
BASOPHILS NFR BLD AUTO: 0.2 %
BUN SERPL-MCNC: 17 MG/DL (ref 6–23)
CALCIUM SERPL-MCNC: 9.2 MG/DL (ref 8.6–10.3)
CHLORIDE SERPL-SCNC: 91 MMOL/L (ref 98–107)
CO2 SERPL-SCNC: 29 MMOL/L (ref 21–32)
CREAT SERPL-MCNC: 1.05 MG/DL (ref 0.5–1.3)
EGFRCR SERPLBLD CKD-EPI 2021: 82 ML/MIN/1.73M*2
EOSINOPHIL # BLD AUTO: 0.03 X10*3/UL (ref 0–0.7)
EOSINOPHIL NFR BLD AUTO: 0.2 %
ERYTHROCYTE [DISTWIDTH] IN BLOOD BY AUTOMATED COUNT: 12.2 % (ref 11.5–14.5)
GLUCOSE SERPL-MCNC: 104 MG/DL (ref 74–99)
HCT VFR BLD AUTO: 42.6 % (ref 41–52)
HGB BLD-MCNC: 14.6 G/DL (ref 13.5–17.5)
IMM GRANULOCYTES # BLD AUTO: 0.05 X10*3/UL (ref 0–0.7)
IMM GRANULOCYTES NFR BLD AUTO: 0.4 % (ref 0–0.9)
LYMPHOCYTES # BLD AUTO: 1.51 X10*3/UL (ref 1.2–4.8)
LYMPHOCYTES NFR BLD AUTO: 12.4 %
MCH RBC QN AUTO: 31.3 PG (ref 26–34)
MCHC RBC AUTO-ENTMCNC: 34.3 G/DL (ref 32–36)
MCV RBC AUTO: 91 FL (ref 80–100)
MONOCYTES # BLD AUTO: 0.93 X10*3/UL (ref 0.1–1)
MONOCYTES NFR BLD AUTO: 7.7 %
NEUTROPHILS # BLD AUTO: 9.6 X10*3/UL (ref 1.2–7.7)
NEUTROPHILS NFR BLD AUTO: 79.1 %
NRBC BLD-RTO: 0 /100 WBCS (ref 0–0)
P AXIS: 74 DEGREES
P OFFSET: 179 MS
P ONSET: 120 MS
PLATELET # BLD AUTO: 314 X10*3/UL (ref 150–450)
POTASSIUM SERPL-SCNC: 4.4 MMOL/L (ref 3.5–5.3)
PR INTERVAL: 184 MS
Q ONSET: 212 MS
QRS COUNT: 13 BEATS
QRS DURATION: 86 MS
QT INTERVAL: 404 MS
QTC CALCULATION(BAZETT): 474 MS
QTC FREDERICIA: 450 MS
R AXIS: -44 DEGREES
RBC # BLD AUTO: 4.67 X10*6/UL (ref 4.5–5.9)
SODIUM SERPL-SCNC: 129 MMOL/L (ref 136–145)
T AXIS: 78 DEGREES
T OFFSET: 414 MS
VENTRICULAR RATE: 83 BPM
WBC # BLD AUTO: 12.2 X10*3/UL (ref 4.4–11.3)

## 2025-03-06 PROCEDURE — 80048 BASIC METABOLIC PNL TOTAL CA: CPT

## 2025-03-06 PROCEDURE — 87081 CULTURE SCREEN ONLY: CPT | Mod: WESLAB

## 2025-03-06 PROCEDURE — 93005 ELECTROCARDIOGRAM TRACING: CPT

## 2025-03-06 PROCEDURE — 99214 OFFICE O/P EST MOD 30 MIN: CPT

## 2025-03-06 PROCEDURE — 93010 ELECTROCARDIOGRAM REPORT: CPT | Performed by: INTERNAL MEDICINE

## 2025-03-06 PROCEDURE — 85025 COMPLETE CBC W/AUTO DIFF WBC: CPT

## 2025-03-06 RX ORDER — ACETAMINOPHEN 325 MG/1
325 TABLET ORAL EVERY 6 HOURS PRN
COMMUNITY

## 2025-03-06 RX ORDER — CHOLECALCIFEROL (VITAMIN D3) 25 MCG
1000 TABLET ORAL DAILY
COMMUNITY

## 2025-03-06 RX ORDER — CHLORHEXIDINE GLUCONATE ORAL RINSE 1.2 MG/ML
SOLUTION DENTAL
Qty: 473 ML | Refills: 0 | Status: SHIPPED | OUTPATIENT
Start: 2025-03-06 | End: 2025-03-20

## 2025-03-06 ASSESSMENT — ENCOUNTER SYMPTOMS
BACK PAIN: 1
MYALGIAS: 1
NAUSEA: 1
PSYCHIATRIC NEGATIVE: 1
CHILLS: 1
EYES NEGATIVE: 1
ENDOCRINE NEGATIVE: 1
ALLERGIC/IMMUNOLOGIC NEGATIVE: 1
NUMBNESS: 1
ARTHRALGIAS: 1
HEMATOLOGIC/LYMPHATIC NEGATIVE: 1

## 2025-03-06 ASSESSMENT — DUKE ACTIVITY SCORE INDEX (DASI)
CAN YOU TAKE CARE OF YOURSELF (EAT, DRESS, BATHE, OR USE TOILET): YES
TOTAL_SCORE: 9.95
CAN YOU PARTICIPATE IN STRENOUS SPORTS LIKE SWIMMING, SINGLES TENNIS, FOOTBALL, BASKETBALL, OR SKIING: NO
CAN YOU WALK INDOORS, SUCH AS AROUND YOUR HOUSE: YES
CAN YOU DO YARD WORK LIKE RAKING LEAVES, WEEDING OR PUSHING A MOWER: NO
CAN YOU RUN A SHORT DISTANCE: NO
CAN YOU DO HEAVY WORK AROUND THE HOUSE LIKE SCRUBBING FLOORS OR LIFTING AND MOVING HEAVY FURNITURE: NO
CAN YOU DO LIGHT WORK AROUND THE HOUSE LIKE DUSTING OR WASHING DISHES: YES
CAN YOU CLIMB A FLIGHT OF STAIRS OR WALK UP A HILL: NO
CAN YOU HAVE SEXUAL RELATIONS: NO
CAN YOU DO MODERATE WORK AROUND THE HOUSE LIKE VACUUMING, SWEEPING FLOORS OR CARRYING GROCERIES: NO
CAN YOU PARTICIPATE IN MODERATE RECREATIONAL ACTIVITIES LIKE GOLF, BOWLING, DANCING, DOUBLES TENNIS OR THROWING A BASEBALL OR FOOTBALL: NO
CAN YOU WALK A BLOCK OR TWO ON LEVEL GROUND: YES
DASI METS SCORE: 4

## 2025-03-06 ASSESSMENT — PAIN - FUNCTIONAL ASSESSMENT: PAIN_FUNCTIONAL_ASSESSMENT: 0-10

## 2025-03-06 ASSESSMENT — PAIN SCALES - GENERAL: PAINLEVEL_OUTOF10: 7

## 2025-03-06 ASSESSMENT — PAIN DESCRIPTION - DESCRIPTORS: DESCRIPTORS: ACHING;DULL

## 2025-03-06 NOTE — CPM/PAT H&P
CPM/PAT Evaluation       Name: Todd Small (Todd Small)  /Age: 1965/59 y.o.     In-Person       Chief Complaint: Low back pain    HPI: Todd Small is a  59 year old male with complaints of low back pain over the past 3 years. He states the pain radiates to his right leg with numbness and tingling as well. He has tried injections in the past with no long term pain relief. He states he has pain with weight bearing and non weight bearing activities. He states he takes Tylenol and Oxycodone for pain at home. He is scheduled for a sacroiliac joint fusion. He denies fever, vomiting, chest pain, sob, dizziness, and palpitations.    Past Medical History:   Diagnosis Date    Anxiety 23    Back pain     Cancer (Multi) 10/01/0-    CHF (congestive heart failure) 10/01/23    Depression 10/01/23    Extremity pain     Headache     Hypothyroidism     Low back pain     Nonischemic cardiomyopathy (Multi)     Thyroid cancer (Multi)        Past Surgical History:   Procedure Laterality Date    APPENDECTOMY  10/01/96    CARDIAC CATHETERIZATION      COLONOSCOPY  2020    HERNIA REPAIR      THYROIDECTOMY      VASECTOMY         Social History     Tobacco Use    Smoking status: Former     Current packs/day: 0.00     Average packs/day: 1 pack/day for 15.0 years (15.0 ttl pk-yrs)     Types: Cigarettes     Start date: 2008     Quit date: 2023     Years since quittin.6    Smokeless tobacco: Never   Substance Use Topics    Alcohol use: Not Currently     Social History     Substance and Sexual Activity   Drug Use Never         Family History   Problem Relation Name Age of Onset    Heart disease Mother Kim     Heart disease Sister (1)     Other (pacemaker) Sister (1)     Heart disease Brother Skye     Atrial fibrillation Brother Gilberto        Allergies   Allergen Reactions    Fluconazole Confusion     PT takes alprazolam as well.  Last fill pt () stated he was disoriented, confused with additional side  effects.   Cant not take both together    Latex Rash    Sulfa (Sulfonamide Antibiotics) Rash and Unknown     Current Outpatient Medications   Medication Sig Dispense Refill    acetaminophen (Tylenol) 325 mg tablet Take 1 tablet (325 mg) by mouth every 6 hours if needed for mild pain (1 - 3) or moderate pain (4 - 6).      ALPRAZolam (Xanax) 0.25 mg tablet Take 2 tablets (0.5 mg) by mouth 3 times a day as needed for anxiety for 7 days, THEN 1 tablet (0.25 mg) 4 times a day as needed for anxiety for 7 days, THEN 1 tablet (0.25 mg) 2 times a day as needed for anxiety for 7 days, THEN 1 tablet (0.25 mg) once daily as needed for anxiety for up to 7 days. 91 tablet 0    amLODIPine (Norvasc) 5 mg tablet Take 1 tablet (5 mg) by mouth once daily. 90 tablet 3    carvedilol (Coreg) 12.5 mg tablet TAKE 1 TABLET BY MOUTH TWO TIMES A DAY WITH FOOD 180 tablet 3    chlorhexidine (Peridex) 0.12 % solution Use as directed 473 mL 0    cholecalciferol (Vitamin D3) 25 mcg (1000 units) tablet Take 1 tablet (1,000 Units) by mouth once daily.      clotrimazole-betamethasone (Lotrisone) cream Apply 1 Application topically 2 times a day for 28 days. 45 g 1    furosemide (Lasix) 40 mg tablet TAKE ONE TABLET BY MOUTH ONCE A DAY 90 tablet 3    levothyroxine (Synthroid) 150 mcg tablet Take 1 tablet (150 mcg) by mouth on MONDAY through SATURDAY, and take 1 AND 1/2 tablets on SUNDAY as directed 96 tablet 1    lidocaine (Aspercreme, lidocaine,) 4 % patch Unwrap and apply 1 patch every 12 hours if needed (low back pain). Remove & discard patch within 12 hours or as directed by MD. 30 patch 2    naloxone (Narcan) 4 mg/0.1 mL nasal spray Administer 1 spray (4 mg) into affected nostril(s) if needed for opioid reversal. May repeat every 2-3 minutes if needed, alternating nostrils, until medical assistance becomes available. 2 each 0    nystatin (Mycostatin) 100,000 unit/gram powder Apply 1 Application topically 2 times a day. 60 g 1    [START ON  3/12/2025] oxyCODONE-acetaminophen (Percocet) 7.5-325 mg tablet Take 1 tablet by mouth 4 times a day as needed for severe pain (7 - 10) for up to 8 days. Do not fill before March 12, 2025. 32 tablet 0    potassium chloride CR (Klor-Con) 10 mEq ER tablet Take 1 tablet (10 mEq) by mouth 2 times a day. 180 tablet 3    sacubitriL-valsartan (Entresto)  mg tablet Take 1 tablet by mouth 2 times a day. 180 tablet 3    venlafaxine XR (Effexor-XR) 150 mg 24 hr capsule Take 1 oral tablet once a day (take with 75mg capsule for a total of 225mg daily) 30 capsule 0    venlafaxine XR (Effexor-XR) 75 mg 24 hr capsule Take one capsule by mouth once a day (take with 150mg capsule for a total of 225mg daily) 90 capsule 0     No current facility-administered medications for this visit.       Review of Systems   Constitutional:  Positive for chills.   HENT: Negative.     Eyes: Negative.    Cardiovascular:  Positive for leg swelling (occasional).   Gastrointestinal:  Positive for nausea (medication related).   Endocrine: Negative.    Genitourinary: Negative.    Musculoskeletal:  Positive for arthralgias, back pain, gait problem and myalgias.   Skin: Negative.    Allergic/Immunologic: Negative.    Neurological:  Positive for numbness (tingling right leg).   Hematological: Negative.    Psychiatric/Behavioral: Negative.                Physical Exam  Vitals reviewed.   Constitutional:       Appearance: Normal appearance.   HENT:      Head: Normocephalic and atraumatic.      Nose: Nose normal.      Mouth/Throat:      Mouth: Mucous membranes are moist.      Pharynx: Oropharynx is clear.   Eyes:      Extraocular Movements: Extraocular movements intact.      Conjunctiva/sclera: Conjunctivae normal.      Comments: Glasses     Cardiovascular:      Rate and Rhythm: Normal rate and regular rhythm.      Pulses: Normal pulses.      Heart sounds: Normal heart sounds.   Pulmonary:      Effort: Pulmonary effort is normal.      Breath sounds: Normal  "breath sounds.   Abdominal:      General: Bowel sounds are normal.      Palpations: Abdomen is soft.   Genitourinary:     Comments: Assessment deferred to physician    Musculoskeletal:      Cervical back: Normal range of motion and neck supple.      Comments: Limited ROM in back. Patient uses a cane to walk. Unable to walk long distances   Skin:     General: Skin is warm and dry.   Neurological:      General: No focal deficit present.      Mental Status: He is alert and oriented to person, place, and time.   Psychiatric:         Mood and Affect: Mood normal.         Behavior: Behavior normal.         Thought Content: Thought content normal.         Judgment: Judgment normal.          PAT AIRWAY:   Airway:     Mallampati::  III    TM distance::  >3 FB    Neck ROM::  Full  normal            Visit Vitals  BP (!) 151/97   Pulse 92   Temp 37 °C (98.6 °F) (Temporal)   Ht 1.803 m (5' 11\")   Wt 108 kg (237 lb 1.6 oz)   SpO2 94%   BMI 33.07 kg/m²   Smoking Status Former   BSA 2.33 m²     ASA: III  CHADS2: 4.0  RCRI: 0.9%  DASI: 9.95  METS: 4  STOP BAN        Assessment and Plan:     Sacroiliitis, not elsewhere classified : FUSION, SACROILIAC JOINT   CHF/ Nonischemic cardiomyopathy: Lasix, Entresto. Follows Cardiology Dr. Bush. Last visit 24. Per his note: 59-year-old male with history of alcohol abuse seen in the hospital in 2022 with acute coronavirus infection. He has significant lower extremity swelling. An echocardiogram identified severe left ventricular systolic dysfunction with an ejection fraction of 20 to 25%, this is increased to 35% on the most recent study. He underwent coronary angiography to reveal normal coronary arteries. He was started on guideline directed medical therapy as well as an oral diuretic. The thought was that his dilated cardiomyopathy could be secondary to alcohol abuse.               He will need cardiac clearance  Hypertension: Amlodipine, " Carvedilol  Hyperlipidemia  Hypothyroid: Levothyroxine  HX thyroid cancer: Thyroidectomy  Depression/ Anxiety: Effexor, Xanex  BMI: 33.07      EKG done in PAT  LABS: MRSA, CBC, BMP ordered in PAT  Patient aware he needs cardiac clearance from Dr. Bush. He will call today. Message left with Dr. Cantrell's office regarding clearance. Waiting on call back.   SERVANDO Ortega-CNP  Addendum  Patient has an appointment with Dr. Bush for cardiac clearance 3/11/25. Patient has a WBC 12.2 and Sodium of 129. Message sent to Dr. Cantrell.   Deepthi Fraire, SERVANDO-CNP

## 2025-03-06 NOTE — PREPROCEDURE INSTRUCTIONS
Medication List            Accurate as of March 6, 2025  8:35 AM. Always use your most recent med list.                acetaminophen 325 mg tablet  Commonly known as: Tylenol  Medication Adjustments for Surgery: Take/Use as prescribed     ALPRAZolam 0.25 mg tablet  Commonly known as: Xanax  Take 2 tablets (0.5 mg) by mouth 3 times a day as needed for anxiety for 7 days, THEN 1 tablet (0.25 mg) 4 times a day as needed for anxiety for 7 days, THEN 1 tablet (0.25 mg) 2 times a day as needed for anxiety for 7 days, THEN 1 tablet (0.25 mg) once daily as needed for anxiety for up to 7 days.  Start taking on: March 3, 2025  Medication Adjustments for Surgery: Take/Use as prescribed  Notes to patient: MAY TAKE MORNING OF SURGERY     amLODIPine 5 mg tablet  Commonly known as: Norvasc  Take 1 tablet (5 mg) by mouth once daily.  Medication Adjustments for Surgery: Take on the morning of surgery     carvedilol 12.5 mg tablet  Commonly known as: Coreg  TAKE 1 TABLET BY MOUTH TWO TIMES A DAY WITH FOOD  Medication Adjustments for Surgery: Take on the morning of surgery     clotrimazole-betamethasone cream  Commonly known as: Lotrisone  Apply 1 Application topically 2 times a day for 28 days.  Medication Adjustments for Surgery: Take/Use as prescribed     Entresto  mg tablet  Generic drug: sacubitriL-valsartan  Take 1 tablet by mouth 2 times a day.  Medication Adjustments for Surgery: Take last dose 1 day (24 hours) before surgery     furosemide 40 mg tablet  Commonly known as: Lasix  TAKE ONE TABLET BY MOUTH ONCE A DAY  Medication Adjustments for Surgery: Do Not take on the morning of surgery     levothyroxine 150 mcg tablet  Commonly known as: Synthroid  Take 1 tablet (150 mcg) by mouth on MONDAY through SATURDAY, and take 1 AND 1/2 tablets on SUNDAY as directed  Medication Adjustments for Surgery: Take on the morning of surgery     lidocaine 4 % patch  Commonly known as: Aspercreme (lidocaine)  Unwrap and apply 1 patch  every 12 hours if needed (low back pain). Remove & discard patch within 12 hours or as directed by MD.  Medication Adjustments for Surgery: Take/Use as prescribed     naloxone 4 mg/0.1 mL nasal spray  Commonly known as: Narcan  Administer 1 spray (4 mg) into affected nostril(s) if needed for opioid reversal. May repeat every 2-3 minutes if needed, alternating nostrils, until medical assistance becomes available.  Medication Adjustments for Surgery: Take/Use as prescribed     Nystop 100,000 unit/gram powder  Generic drug: nystatin  Apply 1 Application topically 2 times a day.  Medication Adjustments for Surgery: Take/Use as prescribed     oxyCODONE-acetaminophen 7.5-325 mg tablet  Commonly known as: Percocet  Take 1 tablet by mouth 4 times a day as needed for severe pain (7 - 10) for up to 8 days. Do not fill before March 12, 2025.  Start taking on: March 12, 2025  Medication Adjustments for Surgery: Take/Use as prescribed  Notes to patient: MAY TAKE MORNING OF PROCEDURE IF NEEDED     potassium chloride CR 10 mEq ER tablet  Commonly known as: Klor-Con  Take 1 tablet (10 mEq) by mouth 2 times a day.  Medication Adjustments for Surgery: Do Not take on the morning of surgery     * venlafaxine XR 75 mg 24 hr capsule  Commonly known as: Effexor-XR  Take one capsule by mouth once a day (take with 150mg capsule for a total of 225mg daily)  Medication Adjustments for Surgery: Take on the morning of surgery     * venlafaxine  mg 24 hr capsule  Commonly known as: Effexor-XR  Take 1 oral tablet once a day (take with 75mg capsule for a total of 225mg daily)  Medication Adjustments for Surgery: Take on the morning of surgery     Vitamin D3 25 mcg (1000 units) tablet  Generic drug: cholecalciferol  Additional Medication Adjustments for Surgery: Take last dose 7 days before surgery           * This list has 2 medication(s) that are the same as other medications prescribed for you. Read the directions carefully, and ask your  doctor or other care provider to review them with you.                                  NPO Instructions:    Do not eat any food after midnight the night before your surgery/procedure.    Additional Instructions:     Day of Surgery:  Review your medication instructions, take indicated medications  Wear  comfortable loose fitting clothing  Do not use moisturizers, creams, lotions or perfume  All jewelry and valuables should be left at home          Why must I stop eating and drinking near surgery time?  With sedation, food or liquid in your stomach can enter your lungs causing serious complications  Increases nausea and vomiting    When do I need to stop eating and drinking before my surgery?   Do not eat or drink after midnight the night before your surgery/procedure.  You may have small sips of water to take your medication.        Patient Information: Pre-Operative Infection Prevention Measures     Why did I have my nose, under my arms, and groin swabbed?  The purpose of the swab is to identify Staphylococcus aureus inside your nose or on your skin.  The swab was sent to the laboratory for culture.  A positive swab/culture for Staphylococcus aureus is called colonization or carriage.      What is Staphylococcus aureus?  Staphylococcus aureus, also known as “staph”, is a germ found on the skin or in the nose of healthy people.  Sometimes Staphylococcus aureus can get into the body and cause an infection.  This can be minor (such as pimples, boils, or other skin problems).  It might also be serious (such as a blood infection, pneumonia, or a surgical site infection).    What is Staphylococcus aureus colonization or carriage?  Colonization or carriage means that a person has the germ but is not sick from it.  These bacteria can be spread on the hands or when breathing or sneezing.    How is Staphylococcus aureus spread?  It is most often spread by close contact with a person or item that carries it.    What happens  if my culture is positive for Staphylococcus aureus?  Your doctor/medical team will use this information to guide any antibiotic treatment which may be necessary.  Regardless of the culture results, we will clean the inside of your nose with a betadine swab just before you have your surgery.      Will I get an infection if I have Staphylococcus aureus in my nose or on my skin?  Anyone can get an infection with Staphylococcus aureus.  However, the best way to reduce your risk of infection is to follow the instructions provided to you for the use of your CHG soap and dental rinse.        Patient Information: Oral/Dental Rinse    What is oral/dental rinse?   It is a mouthwash. It is a way of cleaning the mouth with a germ-killing solution before your surgery.  The solution contains chlorhexidine, commonly known as CHG.   It is used inside the mouth to kill a bacteria known as Staphylococcus aureus.  Let your doctor know if you are allergic to Chlorhexidine.    Why do I need to use CHG oral/dental rinse?  The CHG oral/dental rinse helps to kill a bacteria in your mouth known as Staphylococcus aureus.     This reduces the risk of infection at the surgical site.      Using your CHG oral/dental rinse  AT YOUR PHARMACY  STEPS:  Use your CHG oral/dental rinse after you brush your teeth the night before (at bedtime) and the morning of your surgery.  Follow all directions on your prescription label.    Use the cap on the container to measure 15ml   Swish (gargle if you can) the mouthwash in your mouth for at least 30 seconds, (do not swallow) and spit out  After you use your CHG rinse, do not rinse your mouth with water, drink or eat.  Please refer to the prescription label for the appropriate time to resume oral intake      What side effects might I have using the CHG oral/dental rinse?  CHG rinse will stick to plaque on the teeth.  Brush and floss just before use.  Teeth brushing will help avoid staining of plaque  during use.      Patient Information: Home Preoperative Antibacterial Shower      What is a home preoperative antibacterial shower?  This shower is a way of cleaning the skin with a germ-killing solution before surgery.  The solution contains chlorhexidine, commonly known as CHG.  CHG is a skin cleanser with germ-killing ability.  Let your doctor know if you are allergic to chlorhexidine.    Why do I need to take a preoperative antibacterial shower?  Skin is not sterile.  It is best to try to make your skin as free of germs as possible before surgery.  Proper cleansing with a germ-killing soap before surgery can lower the number of germs on your skin.  This helps to reduce the risk of infection at the surgical site.  Following the instructions listed below will help you prepare your skin for surgery.      How do I use the solution?  Steps:  Begin using your CHG  MARCH 16________________________.    First, wash and rinse your hair using the CHG soap. Keep CHG soap away from ear canals and eyes.  Rinse completely, do not condition.  Hair extensions should be removed.  Wash your face with your normal soap and rinse.    Apply the CHG solution to a clean wet washcloth.  Turn the water off or move away from the water spray to avoid premature rinsing of the CHG soap as you are applying.   Firmly lather your entire body from the neck down.  Do not use on your face.  Pay special attention to the area(s) where your incision(s) will be located unless they are on your face.  Avoid scrubbing your skin too hard.  The important point is to have the CHG soap sit on your skin for 3 minutes.    When the 3 minutes are up, turn on the water and rinse the CHG solution off your body completely.   DO NOT wash with regular soap after you have used the CHG soap solution  Pat yourself dry with a clean, freshly-laundered towel.  DO NOT apply powders, deodorants, or lotions.  Dress in clean, freshly laundered nightclothes.    Be sure to sleep  with clean, freshly laundered sheets.  Be aware that CHG will cause stains on fabrics; if you wash them with bleach after use.  Rinse your washcloth and other linens that have contact with CHG completely.  Use only non-chlorine detergents to launder the items used.   The morning of surgery is the fifth day.  Repeat the above steps and dress in clean comfortable clothing     Whom should I contact if I have any questions regarding the use of CHG soap?  Call the [facility] at [phone] if you have any questions.           PAT DISCHARGE INSTRUCTIONS    Please call the Same Day Surgery (SDS) Department of the hospital where your procedure will be performed after 2:00 PM the day before your surgery. If you are scheduled on a Monday, or a Tuesday following a Monday holiday, you will need to call on the last business day prior to your surgery.    [place] [id number] [phone]  Second Floor      Please let your surgeon know if:      You develop any open sores, shingles, burning or painful urination as these may increase your risk of an infection.   You no longer wish to have the surgery.   Any other personal circumstances change that may lead to the need to cancel or defer this surgery-such as being sick or getting admitted to any hospital within one week of your planned procedure.    Your contact details change, such as a change of address or phone number.    Starting now:     Please DO NOT drink alcohol or smoke for 24 hours before surgery. It is well known that quitting smoking can make a huge difference to your health and recovery from surgery. The longer you abstain from smoking, the better your chances of a healthy recovery. If you need help with quitting, call 0-024-QUIT-NOW to be connected to a trained counselor who will discuss the best methods to help you quit.     Before your  surgery:    Please stop all supplements 7 days prior to surgery. Or as directed by your surgeon.   Please stop taking NSAID pain medicine such as Advil and Motrin 7 days before surgery.    If you develop any fever, cough, cold, rashes, cuts, scratches, scrapes, urinary symptoms or infection anywhere on your body (including teeth and gums) prior to surgery, please call your surgeon’s office as soon as possible. This may require treatment to reduce the chance of cancellation on the day of surgery.    The day before your surgery:   DIET- Please follow the diet instructions at the top of your packet.   Get a good night’s rest.  Use the special soap for bathing if you have been instructed to use one.    Scheduled surgery times may change and you will be notified if this occurs - please check your personal voicemail for any updates.     On the morning of surgery:   Wear comfortable, loose fitting clothes which open in the front. Please do not wear moisturizers, creams, lotions, makeup or perfume.    Please bring with you to surgery:   Photo ID and insurance card   Current list of medicines and allergies   Pacemaker/ Defibrillator/Heart stent cards   CPAP machine and mask    Slings/ splints/ crutches   A copy of your complete advanced directive/DHPOA.    Please do NOT bring with you to surgery:   All jewelry and valuables should be left at home.   Prosthetic devices such as contact lenses, hearing aids, dentures, eyelash extensions, hairpins and body piercings must be removed prior to going in to the surgical suite.    After outpatient surgery:   A responsible adult MUST accompany you at the time of discharge and stay with you for 24 hours after your surgery. You may NOT drive yourself home after surgery.    Do not drive, operate machinery, make critical decisions or do activities that require co-ordination or balance until after a night’s sleep.   Do not drink alcoholic beverages for 24 hours.   Instructions for resuming  your medications will be provided by your surgeon.    CALL YOUR DOCTOR AFTER SURGERY IF YOU HAVE:     Chills and/or a fever of 101° F or higher.    Redness, swelling, pus or drainage from your surgical wound or a bad smell from the wound.    Lightheadedness, fainting or confusion.    Persistent vomiting (throwing up) and are not able to eat or drink for 12 hours.    Three or more loose, watery bowel movements in 24 hours (diarrhea).   Difficulty or pain while urinating( after non-urological surgery)    Pain and swelling in your legs, especially if it is only on one side.    Difficulty breathing or are breathing faster than normal.    Any new concerning symptoms.

## 2025-03-07 ENCOUNTER — APPOINTMENT (OUTPATIENT)
Dept: PAIN MEDICINE | Facility: CLINIC | Age: 60
End: 2025-03-07
Payer: COMMERCIAL

## 2025-03-07 RX ORDER — AMLODIPINE BESYLATE 5 MG/1
5 TABLET ORAL 2 TIMES DAILY
Qty: 180 TABLET | Refills: 3 | Status: SHIPPED | OUTPATIENT
Start: 2025-03-07 | End: 2026-03-07

## 2025-03-08 LAB — STAPHYLOCOCCUS SPEC CULT: NORMAL

## 2025-03-11 ENCOUNTER — OFFICE VISIT (OUTPATIENT)
Facility: CLINIC | Age: 60
End: 2025-03-11
Payer: COMMERCIAL

## 2025-03-11 VITALS
HEART RATE: 102 BPM | WEIGHT: 237 LBS | BODY MASS INDEX: 33.05 KG/M2 | SYSTOLIC BLOOD PRESSURE: 142 MMHG | DIASTOLIC BLOOD PRESSURE: 82 MMHG | OXYGEN SATURATION: 98 %

## 2025-03-11 DIAGNOSIS — I10 PRIMARY HYPERTENSION: ICD-10-CM

## 2025-03-11 DIAGNOSIS — I50.22 CHRONIC SYSTOLIC HEART FAILURE: Primary | ICD-10-CM

## 2025-03-11 DIAGNOSIS — E78.49 OTHER HYPERLIPIDEMIA: ICD-10-CM

## 2025-03-11 DIAGNOSIS — I42.8 NONISCHEMIC CARDIOMYOPATHY (MULTI): ICD-10-CM

## 2025-03-11 PROBLEM — Z01.810 PREOP CARDIOVASCULAR EXAM: Status: ACTIVE | Noted: 2025-03-11

## 2025-03-11 PROCEDURE — 3079F DIAST BP 80-89 MM HG: CPT | Performed by: INTERNAL MEDICINE

## 2025-03-11 PROCEDURE — 1036F TOBACCO NON-USER: CPT | Performed by: INTERNAL MEDICINE

## 2025-03-11 PROCEDURE — 3077F SYST BP >= 140 MM HG: CPT | Performed by: INTERNAL MEDICINE

## 2025-03-11 PROCEDURE — 99214 OFFICE O/P EST MOD 30 MIN: CPT | Performed by: INTERNAL MEDICINE

## 2025-03-11 ASSESSMENT — ENCOUNTER SYMPTOMS
IRREGULAR HEARTBEAT: 0
FEVER: 0
DYSPNEA ON EXERTION: 0
CLAUDICATION: 0
LOSS OF SENSATION IN FEET: 0
SHORTNESS OF BREATH: 0
NEAR-SYNCOPE: 0
SYNCOPE: 0
COUGH: 0
PALPITATIONS: 0
WEIGHT LOSS: 0
OCCASIONAL FEELINGS OF UNSTEADINESS: 1
DIZZINESS: 0
WEIGHT GAIN: 0
WHEEZING: 0
WEAKNESS: 0
MYALGIAS: 0
DIAPHORESIS: 0
DEPRESSION: 0
ORTHOPNEA: 0
PND: 0

## 2025-03-11 ASSESSMENT — PAIN SCALES - GENERAL: PAINLEVEL_OUTOF10: 6

## 2025-03-11 ASSESSMENT — LIFESTYLE VARIABLES: TOTAL SCORE: 0

## 2025-03-11 NOTE — ASSESSMENT & PLAN NOTE
High normal. Will continue meds as is for now. Encouraged BP monitoring at home 2-3x/week. Lifestyle modifications were discussed. I advocated for mediterranean and plant based eating. We also discussed exercise. 150 minutes a week of moderate intensity exercise is recommended per our ACC/AHA guidelines

## 2025-03-11 NOTE — ASSESSMENT & PLAN NOTE
The patient is of intermediate risk for a surgical procedure that carries an inherent moderate risk of adverse cardiac events. In the absence of acute coronary syndrome, acutely decompensated heart failure, hemodynamically significant valvular disease, or malignant arrhythmia I would consider this risk acceptable to proceed without further cardiac workup or intervention.

## 2025-03-11 NOTE — ASSESSMENT & PLAN NOTE
Secondary to nonischemic cardiomyopathy.  He is on carvedilol, Entresto, intolerant to Farxiga.  I plan on putting him on spironolactone and reducing his amlodipine dose.  Will discontinue potassium supplementation at that time as well.  However, will keep things as is for now and plan on doing this after his operations.  Will have him follow-up with me in the office in 3 to 4 months.

## 2025-03-11 NOTE — PROGRESS NOTES
Subjective      Chief Complaint   Patient presents with    s/c    joint fusion        59-year-old male with history of alcohol abuse seen in the hospital in November 2022 with acute coronavirus infection.  He has significant lower extremity swelling.  An echocardiogram identified severe left ventricular systolic dysfunction with an ejection fraction of 20 to 25%, this is increased to 35% on the most recent study.  He underwent coronary angiography to reveal normal coronary arteries.  He was started on guideline directed medical therapy as well as an oral diuretic.  The thought was that his dilated cardiomyopathy could be secondary to alcohol abuse.  He is here for preoperative cardiac risk stratification for noncardiac surgery.         Review of Systems   Constitutional: Negative for diaphoresis, fever, weight gain and weight loss.   Eyes:  Negative for visual disturbance.   Cardiovascular:  Negative for chest pain, claudication, dyspnea on exertion, irregular heartbeat, leg swelling, near-syncope, orthopnea, palpitations, paroxysmal nocturnal dyspnea and syncope.   Respiratory:  Negative for cough, shortness of breath and wheezing.    Musculoskeletal:  Negative for muscle weakness and myalgias.   Neurological:  Negative for dizziness and weakness.   All other systems reviewed and are negative.       Past Medical History:   Diagnosis Date    Anxiety 4/01/23    Back pain     Cancer (Multi) 10/01/0-    CHF (congestive heart failure) 10/01/23    Depression 10/01/23    Extremity pain     Headache     Hypothyroidism     Low back pain     Nonischemic cardiomyopathy (Multi)     Personal history of irradiation 1/10/95    Thyroid cancer (Multi)         Past Surgical History:   Procedure Laterality Date    APPENDECTOMY  10/01/96    CARDIAC CATHETERIZATION      CIRCUMCISION, PRIMARY      COLONOSCOPY  09/11/2020    HERNIA REPAIR      THYROIDECTOMY      VASECTOMY          Social History     Socioeconomic History    Marital  status:      Spouse name: Not on file    Number of children: Not on file    Years of education: Not on file    Highest education level: Not on file   Occupational History    Not on file   Tobacco Use    Smoking status: Former     Current packs/day: 0.00     Average packs/day: 1 pack/day for 15.0 years (15.0 ttl pk-yrs)     Types: Cigarettes     Start date: 2008     Quit date: 2023     Years since quittin.6    Smokeless tobacco: Never   Vaping Use    Vaping status: Never Used   Substance and Sexual Activity    Alcohol use: Not Currently    Drug use: Never    Sexual activity: Yes     Partners: Female     Birth control/protection: None   Other Topics Concern    Not on file   Social History Narrative    Not on file     Social Drivers of Health     Financial Resource Strain: Not on file   Food Insecurity: Not on file   Transportation Needs: Not on file   Physical Activity: Not on file   Stress: Not on file   Social Connections: Not on file   Intimate Partner Violence: Not on file   Housing Stability: Not on file        Family History   Problem Relation Name Age of Onset    Heart disease Mother Kim     Heart disease Sister (1)     Other (pacemaker) Sister (1)     Heart disease Brother Skye     Atrial fibrillation Brother Gilberto         OBJECTIVE:    Vitals:    25 1042   BP: 142/82   Pulse: 102   SpO2: 98%        Vitals reviewed.   Constitutional:       Appearance: Normal and healthy appearance. Not in distress.   Pulmonary:      Effort: Pulmonary effort is normal.      Breath sounds: Normal breath sounds.   Cardiovascular:      Normal rate. Regular rhythm. Normal S1. Normal S2.       Murmurs: There is no murmur.      No gallop.  No click.   Pulses:     Intact distal pulses.   Edema:     Peripheral edema absent.   Skin:     General: Skin is warm and dry.   Neurological:      General: No focal deficit present.          Lab Review:   Lab Results   Component Value Date     (L) 2025    K  4.4 03/06/2025    CL 91 (L) 03/06/2025    CO2 29 03/06/2025    BUN 17 03/06/2025    CREATININE 1.05 03/06/2025    GLUCOSE 104 (H) 03/06/2025    CALCIUM 9.2 03/06/2025     Lab Results   Component Value Date    CHOL 214 (H) 09/20/2024    TRIG 90 09/20/2024    HDL 50.5 09/20/2024       Lab Results   Component Value Date    LDLCALC 146 (H) 09/20/2024        Preop cardiovascular exam  The patient is of intermediate risk for a surgical procedure that carries an inherent moderate risk of adverse cardiac events. In the absence of acute coronary syndrome, acutely decompensated heart failure, hemodynamically significant valvular disease, or malignant arrhythmia I would consider this risk acceptable to proceed without further cardiac workup or intervention.       Hypertension  High normal. Will continue meds as is for now. Encouraged BP monitoring at home 2-3x/week. Lifestyle modifications were discussed. I advocated for mediterranean and plant based eating. We also discussed exercise. 150 minutes a week of moderate intensity exercise is recommended per our ACC/AHA guidelines      Chronic systolic heart failure (Multi)  Secondary to nonischemic cardiomyopathy.  He is on carvedilol, Entresto, intolerant to Farxiga.  I plan on putting him on spironolactone and reducing his amlodipine dose.  Will discontinue potassium supplementation at that time as well.  However, will keep things as is for now and plan on doing this after his operations.  Will have him follow-up with me in the office in 3 to 4 months.

## 2025-03-19 DIAGNOSIS — M54.16 LUMBAR RADICULOPATHY: ICD-10-CM

## 2025-03-19 RX ORDER — OXYCODONE AND ACETAMINOPHEN 7.5; 325 MG/1; MG/1
1 TABLET ORAL 4 TIMES DAILY PRN
Qty: 32 TABLET | Refills: 0 | Status: CANCELLED | OUTPATIENT
Start: 2025-03-19 | End: 2025-03-27

## 2025-03-19 NOTE — TELEPHONE ENCOUNTER
Sol called requesting a refill of his oxycodone.  He stated he would like to stay at his current dose unless Dr. Cantrell feels differently.  He is scheduled for surgery 3/20/25 and has a follow up 4/3/25.

## 2025-03-20 ENCOUNTER — APPOINTMENT (OUTPATIENT)
Dept: RADIOLOGY | Facility: HOSPITAL | Age: 60
End: 2025-03-20
Payer: COMMERCIAL

## 2025-03-20 ENCOUNTER — ANESTHESIA EVENT (OUTPATIENT)
Dept: OPERATING ROOM | Facility: HOSPITAL | Age: 60
End: 2025-03-20
Payer: COMMERCIAL

## 2025-03-20 ENCOUNTER — PHARMACY VISIT (OUTPATIENT)
Dept: PHARMACY | Facility: CLINIC | Age: 60
End: 2025-03-20
Payer: MEDICAID

## 2025-03-20 ENCOUNTER — HOSPITAL ENCOUNTER (OUTPATIENT)
Facility: HOSPITAL | Age: 60
Setting detail: OUTPATIENT SURGERY
Discharge: HOME | End: 2025-03-20
Attending: ANESTHESIOLOGY | Admitting: ANESTHESIOLOGY
Payer: COMMERCIAL

## 2025-03-20 ENCOUNTER — ANESTHESIA (OUTPATIENT)
Dept: OPERATING ROOM | Facility: HOSPITAL | Age: 60
End: 2025-03-20
Payer: COMMERCIAL

## 2025-03-20 VITALS
HEART RATE: 80 BPM | BODY MASS INDEX: 33.18 KG/M2 | WEIGHT: 237 LBS | DIASTOLIC BLOOD PRESSURE: 88 MMHG | OXYGEN SATURATION: 96 % | SYSTOLIC BLOOD PRESSURE: 139 MMHG | HEIGHT: 71 IN | RESPIRATION RATE: 20 BRPM | TEMPERATURE: 97.5 F

## 2025-03-20 DIAGNOSIS — M46.1 SACROILIITIS, NOT ELSEWHERE CLASSIFIED: Primary | ICD-10-CM

## 2025-03-20 PROCEDURE — 2500000001 HC RX 250 WO HCPCS SELF ADMINISTERED DRUGS (ALT 637 FOR MEDICARE OP): Performed by: ANESTHESIOLOGY

## 2025-03-20 PROCEDURE — 7100000002 HC RECOVERY ROOM TIME - EACH INCREMENTAL 1 MINUTE: Performed by: ANESTHESIOLOGY

## 2025-03-20 PROCEDURE — A27279 PR ARTHRODESIS SACROILIAC JOINT PERCUTANEOUS: Performed by: NURSE ANESTHETIST, CERTIFIED REGISTERED

## 2025-03-20 PROCEDURE — 2720000007 HC OR 272 NO HCPCS: Performed by: ANESTHESIOLOGY

## 2025-03-20 PROCEDURE — 7100000010 HC PHASE TWO TIME - EACH INCREMENTAL 1 MINUTE: Performed by: ANESTHESIOLOGY

## 2025-03-20 PROCEDURE — 2500000002 HC RX 250 W HCPCS SELF ADMINISTERED DRUGS (ALT 637 FOR MEDICARE OP, ALT 636 FOR OP/ED): Performed by: NURSE ANESTHETIST, CERTIFIED REGISTERED

## 2025-03-20 PROCEDURE — RXMED WILLOW AMBULATORY MEDICATION CHARGE

## 2025-03-20 PROCEDURE — 3600000010 HC OR TIME - EACH INCREMENTAL 1 MINUTE - PROCEDURE LEVEL FIVE: Performed by: ANESTHESIOLOGY

## 2025-03-20 PROCEDURE — 2500000004 HC RX 250 GENERAL PHARMACY W/ HCPCS (ALT 636 FOR OP/ED): Mod: JW | Performed by: ANESTHESIOLOGY

## 2025-03-20 PROCEDURE — 27279 ARTHRD SI JT PLMT TARTCLR DV: CPT | Performed by: ANESTHESIOLOGY

## 2025-03-20 PROCEDURE — A27279 PR ARTHRODESIS SACROILIAC JOINT PERCUTANEOUS: Performed by: ANESTHESIOLOGY

## 2025-03-20 PROCEDURE — 2500000004 HC RX 250 GENERAL PHARMACY W/ HCPCS (ALT 636 FOR OP/ED): Performed by: ANESTHESIOLOGY

## 2025-03-20 PROCEDURE — 2780000003 HC OR 278 NO HCPCS: Performed by: ANESTHESIOLOGY

## 2025-03-20 PROCEDURE — 7100000001 HC RECOVERY ROOM TIME - INITIAL BASE CHARGE: Performed by: ANESTHESIOLOGY

## 2025-03-20 PROCEDURE — 3600000005 HC OR TIME - INITIAL BASE CHARGE - PROCEDURE LEVEL FIVE: Performed by: ANESTHESIOLOGY

## 2025-03-20 PROCEDURE — 2500000005 HC RX 250 GENERAL PHARMACY W/O HCPCS: Performed by: NURSE ANESTHETIST, CERTIFIED REGISTERED

## 2025-03-20 PROCEDURE — 3700000002 HC GENERAL ANESTHESIA TIME - EACH INCREMENTAL 1 MINUTE: Performed by: ANESTHESIOLOGY

## 2025-03-20 PROCEDURE — 3700000001 HC GENERAL ANESTHESIA TIME - INITIAL BASE CHARGE: Performed by: ANESTHESIOLOGY

## 2025-03-20 PROCEDURE — 7100000009 HC PHASE TWO TIME - INITIAL BASE CHARGE: Performed by: ANESTHESIOLOGY

## 2025-03-20 PROCEDURE — 2500000004 HC RX 250 GENERAL PHARMACY W/ HCPCS (ALT 636 FOR OP/ED): Performed by: NURSE ANESTHETIST, CERTIFIED REGISTERED

## 2025-03-20 PROCEDURE — 2500000002 HC RX 250 W HCPCS SELF ADMINISTERED DRUGS (ALT 637 FOR MEDICARE OP, ALT 636 FOR OP/ED): Performed by: ANESTHESIOLOGY

## 2025-03-20 DEVICE — IMPLANTABLE DEVICE: Type: IMPLANTABLE DEVICE | Site: SACRUM | Status: FUNCTIONAL

## 2025-03-20 RX ORDER — MEPERIDINE HYDROCHLORIDE 25 MG/ML
12.5 INJECTION INTRAMUSCULAR; INTRAVENOUS; SUBCUTANEOUS EVERY 10 MIN PRN
Status: DISCONTINUED | OUTPATIENT
Start: 2025-03-20 | End: 2025-03-20 | Stop reason: HOSPADM

## 2025-03-20 RX ORDER — OXYCODONE AND ACETAMINOPHEN 7.5; 325 MG/1; MG/1
TABLET ORAL
Qty: 134 TABLET | Refills: 0 | Status: SHIPPED | OUTPATIENT
Start: 2025-03-20 | End: 2025-04-19

## 2025-03-20 RX ORDER — METOCLOPRAMIDE 10 MG/1
10 TABLET ORAL ONCE
Status: COMPLETED | OUTPATIENT
Start: 2025-03-20 | End: 2025-03-20

## 2025-03-20 RX ORDER — ONDANSETRON HYDROCHLORIDE 2 MG/ML
4 INJECTION, SOLUTION INTRAVENOUS ONCE AS NEEDED
Status: DISCONTINUED | OUTPATIENT
Start: 2025-03-20 | End: 2025-03-20 | Stop reason: HOSPADM

## 2025-03-20 RX ORDER — MIDAZOLAM HYDROCHLORIDE 1 MG/ML
1 INJECTION, SOLUTION INTRAMUSCULAR; INTRAVENOUS ONCE AS NEEDED
Status: DISCONTINUED | OUTPATIENT
Start: 2025-03-20 | End: 2025-03-20 | Stop reason: HOSPADM

## 2025-03-20 RX ORDER — LIDOCAINE HYDROCHLORIDE AND EPINEPHRINE 10; 10 UG/ML; MG/ML
INJECTION, SOLUTION INFILTRATION; PERINEURAL AS NEEDED
Status: DISCONTINUED | OUTPATIENT
Start: 2025-03-20 | End: 2025-03-20 | Stop reason: HOSPADM

## 2025-03-20 RX ORDER — KETOROLAC TROMETHAMINE 30 MG/ML
15 INJECTION, SOLUTION INTRAMUSCULAR; INTRAVENOUS ONCE AS NEEDED
Status: DISCONTINUED | OUTPATIENT
Start: 2025-03-20 | End: 2025-03-20 | Stop reason: HOSPADM

## 2025-03-20 RX ORDER — OXYCODONE HYDROCHLORIDE 5 MG/1
5 TABLET ORAL ONCE AS NEEDED
Status: CANCELLED | OUTPATIENT
Start: 2025-03-20

## 2025-03-20 RX ORDER — ALBUTEROL SULFATE 0.83 MG/ML
2.5 SOLUTION RESPIRATORY (INHALATION) ONCE AS NEEDED
Status: DISCONTINUED | OUTPATIENT
Start: 2025-03-20 | End: 2025-03-20 | Stop reason: HOSPADM

## 2025-03-20 RX ORDER — DIPHENHYDRAMINE HYDROCHLORIDE 50 MG/ML
12.5 INJECTION, SOLUTION INTRAMUSCULAR; INTRAVENOUS ONCE AS NEEDED
Status: DISCONTINUED | OUTPATIENT
Start: 2025-03-20 | End: 2025-03-20 | Stop reason: HOSPADM

## 2025-03-20 RX ORDER — CEFAZOLIN SODIUM 2 G/100ML
2 INJECTION, SOLUTION INTRAVENOUS ONCE
Status: COMPLETED | OUTPATIENT
Start: 2025-03-20 | End: 2025-03-20

## 2025-03-20 RX ORDER — LIDOCAINE HYDROCHLORIDE 10 MG/ML
0.1 INJECTION, SOLUTION INFILTRATION; PERINEURAL ONCE
Status: DISCONTINUED | OUTPATIENT
Start: 2025-03-20 | End: 2025-03-20 | Stop reason: HOSPADM

## 2025-03-20 RX ORDER — FAMOTIDINE 20 MG/1
20 TABLET, FILM COATED ORAL ONCE
Status: COMPLETED | OUTPATIENT
Start: 2025-03-20 | End: 2025-03-20

## 2025-03-20 RX ORDER — GENTAMICIN 40 MG/ML
INJECTION, SOLUTION INTRAMUSCULAR; INTRAVENOUS AS NEEDED
Status: DISCONTINUED | OUTPATIENT
Start: 2025-03-20 | End: 2025-03-20 | Stop reason: HOSPADM

## 2025-03-20 RX ORDER — ONDANSETRON HYDROCHLORIDE 2 MG/ML
4 INJECTION, SOLUTION INTRAVENOUS EVERY 8 HOURS PRN
Status: CANCELLED | OUTPATIENT
Start: 2025-03-20

## 2025-03-20 RX ORDER — NORETHINDRONE AND ETHINYL ESTRADIOL 0.5-0.035
50 KIT ORAL ONCE
Status: DISCONTINUED | OUTPATIENT
Start: 2025-03-20 | End: 2025-03-20 | Stop reason: HOSPADM

## 2025-03-20 RX ORDER — FENTANYL CITRATE 50 UG/ML
INJECTION, SOLUTION INTRAMUSCULAR; INTRAVENOUS AS NEEDED
Status: DISCONTINUED | OUTPATIENT
Start: 2025-03-20 | End: 2025-03-20

## 2025-03-20 RX ORDER — HYDROMORPHONE HYDROCHLORIDE 0.2 MG/ML
0.1 INJECTION INTRAMUSCULAR; INTRAVENOUS; SUBCUTANEOUS EVERY 5 MIN PRN
Status: DISCONTINUED | OUTPATIENT
Start: 2025-03-20 | End: 2025-03-20 | Stop reason: HOSPADM

## 2025-03-20 RX ORDER — ALBUTEROL SULFATE 0.83 MG/ML
2.5 SOLUTION RESPIRATORY (INHALATION) ONCE
Status: COMPLETED | OUTPATIENT
Start: 2025-03-20 | End: 2025-03-20

## 2025-03-20 RX ORDER — HYDRALAZINE HYDROCHLORIDE 20 MG/ML
10 INJECTION INTRAMUSCULAR; INTRAVENOUS EVERY 30 MIN PRN
Status: DISCONTINUED | OUTPATIENT
Start: 2025-03-20 | End: 2025-03-20 | Stop reason: HOSPADM

## 2025-03-20 RX ORDER — PROPOFOL 10 MG/ML
INJECTION, EMULSION INTRAVENOUS AS NEEDED
Status: DISCONTINUED | OUTPATIENT
Start: 2025-03-20 | End: 2025-03-20

## 2025-03-20 RX ORDER — LIDOCAINE HYDROCHLORIDE 10 MG/ML
INJECTION, SOLUTION INFILTRATION; PERINEURAL AS NEEDED
Status: DISCONTINUED | OUTPATIENT
Start: 2025-03-20 | End: 2025-03-20

## 2025-03-20 RX ORDER — BUPIVACAINE HCL/EPINEPHRINE 0.25-.0005
VIAL (ML) INJECTION AS NEEDED
Status: DISCONTINUED | OUTPATIENT
Start: 2025-03-20 | End: 2025-03-20 | Stop reason: HOSPADM

## 2025-03-20 RX ORDER — LABETALOL HYDROCHLORIDE 5 MG/ML
10 INJECTION, SOLUTION INTRAVENOUS ONCE AS NEEDED
Status: DISCONTINUED | OUTPATIENT
Start: 2025-03-20 | End: 2025-03-20 | Stop reason: HOSPADM

## 2025-03-20 RX ORDER — ONDANSETRON 4 MG/1
4 TABLET, ORALLY DISINTEGRATING ORAL ONCE AS NEEDED
Status: CANCELLED | OUTPATIENT
Start: 2025-03-20

## 2025-03-20 RX ORDER — MIDAZOLAM HYDROCHLORIDE 1 MG/ML
INJECTION, SOLUTION INTRAMUSCULAR; INTRAVENOUS AS NEEDED
Status: DISCONTINUED | OUTPATIENT
Start: 2025-03-20 | End: 2025-03-20

## 2025-03-20 RX ORDER — GLYCOPYRROLATE 0.2 MG/ML
0.2 INJECTION INTRAMUSCULAR; INTRAVENOUS ONCE
Status: DISCONTINUED | OUTPATIENT
Start: 2025-03-20 | End: 2025-03-20 | Stop reason: HOSPADM

## 2025-03-20 RX ORDER — ALBUTEROL SULFATE 0.83 MG/ML
SOLUTION RESPIRATORY (INHALATION) AS NEEDED
Status: DISCONTINUED | OUTPATIENT
Start: 2025-03-20 | End: 2025-03-20

## 2025-03-20 RX ADMIN — Medication 20 MG: at 12:45

## 2025-03-20 RX ADMIN — ALBUTEROL SULFATE 2.5 MG: 2.5 SOLUTION RESPIRATORY (INHALATION) at 12:29

## 2025-03-20 RX ADMIN — HYDROMORPHONE HYDROCHLORIDE 0.5 MG: 1 INJECTION, SOLUTION INTRAMUSCULAR; INTRAVENOUS; SUBCUTANEOUS at 14:21

## 2025-03-20 RX ADMIN — MIDAZOLAM 2 MG: 1 INJECTION INTRAMUSCULAR; INTRAVENOUS at 12:29

## 2025-03-20 RX ADMIN — METOCLOPRAMIDE 10 MG: 10 TABLET ORAL at 11:06

## 2025-03-20 RX ADMIN — HYDROMORPHONE HYDROCHLORIDE 0.5 MG: 1 INJECTION, SOLUTION INTRAMUSCULAR; INTRAVENOUS; SUBCUTANEOUS at 14:30

## 2025-03-20 RX ADMIN — PROPOFOL 100 MCG/KG/MIN: 10 INJECTION, EMULSION INTRAVENOUS at 12:36

## 2025-03-20 RX ADMIN — LIDOCAINE HYDROCHLORIDE 5 ML: 10 INJECTION, SOLUTION INFILTRATION; PERINEURAL at 12:35

## 2025-03-20 RX ADMIN — FENTANYL CITRATE 50 MCG: 0.05 INJECTION, SOLUTION INTRAMUSCULAR; INTRAVENOUS at 12:35

## 2025-03-20 RX ADMIN — SODIUM CHLORIDE, POTASSIUM CHLORIDE, SODIUM LACTATE AND CALCIUM CHLORIDE: 600; 310; 30; 20 INJECTION, SOLUTION INTRAVENOUS at 12:29

## 2025-03-20 RX ADMIN — PROPOFOL 60 MG: 10 INJECTION, EMULSION INTRAVENOUS at 12:35

## 2025-03-20 RX ADMIN — CEFAZOLIN SODIUM 2 G: 2 INJECTION, SOLUTION INTRAVENOUS at 12:39

## 2025-03-20 RX ADMIN — ALBUTEROL SULFATE 2.5 MG: 2.5 SOLUTION RESPIRATORY (INHALATION) at 11:05

## 2025-03-20 RX ADMIN — FAMOTIDINE 20 MG: 20 TABLET, FILM COATED ORAL at 11:06

## 2025-03-20 SDOH — HEALTH STABILITY: MENTAL HEALTH: CURRENT SMOKER: 0

## 2025-03-20 ASSESSMENT — PAIN DESCRIPTION - DESCRIPTORS
DESCRIPTORS: DISCOMFORT
DESCRIPTORS: ACHING;SHARP;NAGGING
DESCRIPTORS: ACHING;NAGGING;SHARP
DESCRIPTORS: DISCOMFORT
DESCRIPTORS: ACHING
DESCRIPTORS: DISCOMFORT
DESCRIPTORS: DISCOMFORT
DESCRIPTORS: ACHING;CRAMPING;NAGGING

## 2025-03-20 ASSESSMENT — PAIN - FUNCTIONAL ASSESSMENT
PAIN_FUNCTIONAL_ASSESSMENT: 0-10

## 2025-03-20 ASSESSMENT — PAIN SCALES - GENERAL
PAINLEVEL_OUTOF10: 6
PAINLEVEL_OUTOF10: 7
PAINLEVEL_OUTOF10: 8
PAINLEVEL_OUTOF10: 0 - NO PAIN
PAIN_LEVEL: 4
PAINLEVEL_OUTOF10: 7
PAINLEVEL_OUTOF10: 6
PAINLEVEL_OUTOF10: 6

## 2025-03-20 ASSESSMENT — PAIN DESCRIPTION - LOCATION
LOCATION: BACK
LOCATION: BACK

## 2025-03-20 ASSESSMENT — COLUMBIA-SUICIDE SEVERITY RATING SCALE - C-SSRS
2. HAVE YOU ACTUALLY HAD ANY THOUGHTS OF KILLING YOURSELF?: NO
1. IN THE PAST MONTH, HAVE YOU WISHED YOU WERE DEAD OR WISHED YOU COULD GO TO SLEEP AND NOT WAKE UP?: NO
6. HAVE YOU EVER DONE ANYTHING, STARTED TO DO ANYTHING, OR PREPARED TO DO ANYTHING TO END YOUR LIFE?: NO

## 2025-03-20 ASSESSMENT — PAIN DESCRIPTION - ORIENTATION: ORIENTATION: LOWER;RIGHT

## 2025-03-20 NOTE — PERIOPERATIVE NURSING NOTE
1515- Arrived to [facility] 15 via cart with RN. Alert, denies nausea. No pain in surgical site but radiating to right thigh. Repositioned for comfort. Dressing to right low back dry and intact. Set up with drink and snack, call light within reach, spouse at bedside. Discharge medications previously brought to bedside.   1540- Tolerating PO. Pain at surgical site increasing, now 7/10. Ice to area. Dressing with scant bloody drainage to low lateral aspect. Pt medicated self with discharge percocet. VSS. Reviewed discharge instructions, questions answered. Spouse assisted pt with changing for discharge. Dr Cantrell to bedside to discusses follow up care. Discharged home.

## 2025-03-20 NOTE — ANESTHESIA PREPROCEDURE EVALUATION
Patient: Todd Small    Procedure Information       Date/Time: 03/20/25 1200    Procedure: Posteriolateral Sacroiliac Joint Fusion (Right) - C-ARM, EDUARDO TABLE, ESTELA - TRANSLOC    [facility]: [facility] / [facility]    Surgeons: Nathaly Cantrell MD            Relevant Problems   Cardiac   (+) HLD (hyperlipidemia)   (+) Hypertension      Neuro   (+) Anxiety   (+) Depression   (+) Depressive disorder   (+) Disorder of right cervical nerve root   (+) Generalized anxiety disorder   (+) Right cervical radiculopathy      Endocrine   (+) Postoperative hypothyroidism      Musculoskeletal   (+) Spinal stenosis of lumbar region without neurogenic claudication      ID   (+) Tinea cruris       Clinical information reviewed:   Tobacco  Allergies  Meds   Med Hx  Surg Hx   Fam Hx  Soc Hx        NPO Detail:  NPO/Void Status  Carbohydrate Drink Given Prior to Surgery? : N  Date of Last Liquid: 03/20/25  Time of Last Liquid: 0500 (sips with meds)  Date of Last Solid: 03/19/25  Time of Last Solid: 2345  Last Intake Type: Clear fluids  Time of Last Void: 1046         Physical Exam    Airway  Mallampati: II  TM distance: >3 FB     Cardiovascular   Rhythm: regular  Rate: normal     Dental - normal exam     Pulmonary   (+) wheezes     Abdominal      Other findings: Slightly wheezing lower lung fields.  RR is nl but seems to be mildly SOB, claims he's off 'kilter' due to no Entresto today.  Off Low sodium diet.          Anesthesia Plan    History of general anesthesia?: unknown/emergency  History of complications of general anesthesia?: no    ASA 3     general and other   There is reasoning for not using neuraxial anesthesia or a peripheral nerve block.  (Labs OK except for hyponatremia (new) claims from low Na diet. Asymptomatic. Preop aerosol. Hx EtOH and heavy benzo/narcotic use. EKG sr lbbb, echo reviewed 2023.  Eval by Dr. Bush preop and felt to be stable.  RA O2 Sat 98%. No JVD sitting.  He is motivated to get this  done today.  )  The patient is not a current smoker.  Education provided regarding risk of obstructive sleep apnea. (in appropriate circumstances)  intravenous induction   Anesthetic plan and risks discussed with patient.    Plan discussed with CRNA, CAA and attending.

## 2025-03-20 NOTE — INTERVAL H&P NOTE
H&P reviewed. The patient was examined and there are no changes to the H&P.  Todd Small is a 59 y.o. male with PMH CHF 2/2 former EtOH use, former smoker, CKD, MDD, papillary thyroid ca s/p thyroidectomy/RT with hypothyroidism who presents for percutaneous posterolateral R SIJ fusion to target pain generator as seen on imaging/PE and minimize risk/likelihood of chronic opioid use and/or more extensive spine surgery. Patient's pain stable and persistent from last visit.  Appropriately NPO. No personal/family hx issues with anesthesia. Denies allergies to steroids, local anesthetics, or iodine/contrast. Denies being on blood thinners. Not diabetic.  Denies fever, chills, NS, CP, SOB, cough, N/V.    Discussed procedure risks/benefits in detail with patient. Pt meets medical necessity for procedure due to failure of conservative measures. Reviewed procedural risks including bleeding, infection, nerve damage, paralysis. Also reviewed mitigating factors such as screening for infection/blood thinner use, sterile precautions, and image-guidance when applicable. All questions answered. Pt/guardian expressed understanding and choose to proceed      Nathaly Cantrell MD  Anesthesiologist & Interventional Pain Physician   [facility] [place]  O: [phone]  F: [phone]  11:08 AM  03/20/25

## 2025-03-20 NOTE — OP NOTE
Posteriolateral Sacroiliac Joint Fusion (R) Operative Note     Date: 3/20/2025  OR Location: [facility]    Name: Todd Small, : 1965, Age: 59 y.o., MRN: [id number], Sex: male    Diagnosis  Pre-op Diagnosis      * Sacroiliitis, not elsewhere classified (CMS-HCC) [M46.1] Post-op Diagnosis     * Sacroiliitis, not elsewhere classified (CMS-[facility]) [M46.1]     Procedures  Posteriolateral Sacroiliac Joint Fusion  [id number] - ND ARTHRODESIS SI JOINT PERCUTANEOUS/MIN INVASIVE      Surgeons      * Nathaly Cantrell - Primary    Resident/Fellow/Other Assistant:  Surgeons and Role:  * No surgeons found with a matching role *    Staff:   Circulator: Bipin Elkins Person: Jessica    Anesthesia Staff: Anesthesiologist: Tam Parkinson DO  CRNA: SERVANDO Read-DEBORAH    Procedure Summary  Anesthesia: Anesthesia type not filed in the log.  ASA: ASA status not filed in the log.  Estimated Blood Loss: 5 mL  Intra-op Medications: Administrations occurring from 25 1516 to 25 1516:  * No intraprocedure medications in log *           Anesthesia Record               Intraprocedure I/O Totals          Intake    Propofol Drip 0.00 mL    The total shown is the total volume documented since Anesthesia Start was filed.    Total Intake 0 mL          Specimen: No specimens collected              Drains and/or Catheters: * None in log *    Tourniquet Times:         Implants:  Implants       Type Name Action Serial No.       transloc 3d mscrew, 8.5mm x 45mm Implanted       transloc 3d mscrew, 8.5mm x 40mm Implanted               Sacroiliac joint fusion, RIGHT     Procedure:  percutaneous posterolateral sacroiliac joint fusion under fluoroscopy (Transloc)  Surgeon: Nathaly Cantrell MD  Preoperative Diagnosis: RIGHT sacroiliitis   Postoperative diagnosis:  RIGHT sacroiliitis   Anesthesia: MAC  Fluids: 500 ml LR  EBL: 5 cc   Complications: None     Indication: Patient has history of chronic RIGHT sacroiliitis, which failed  "conservative management and therapeutic sacroiliac joint (SIJ) injection. Pt underwent diagnostic RIGHT SIJ block with >75% relief on two separate occasions before symptoms recurred. The risks, indications, limitations, and alternatives to this surgery were discussed with the patient and/or family. Pt acknowledged understanding of pre-existing conditions that may increase perioperative risk, including, but not limited to, bleeding and infection. Patient then provided informed consent to proceed with procedure.       Description of procedure: The patient was placed on the operating table in the prone position. Anesthesiologist present for time-out and provided MAC for procedure. See anesthesiologist record for sedation details and VS. 2 g Ancef IV given as pre-operative infection prophylaxis. The lumbosacral region was prepped with chlorhexidine solution and sterile drapes were positioned. Betadine-impregnated drape was placed directly over the surgical field.      The superior aspect of the RIGHT joint was identified on contralateral oblique with cranial tilt. A sacral outlet view of the sacrum was obtained, the RIGHT PSIS palpated and marked off, and lines were drawn on the skin in line overlying the S1 and S2 neuroforamen. A 22 G 3.5\" Quinke spinal needle was utilized to anesthetize the PSIS periosteum with 10 ml 1% lidocaine with 1:200k epinephrine. Additional 5 ml lidocaine used to anesthetize the skin. A #15 blade was used to make an incision. Electrocautery was used to achieve hemostasis and dissect superficial soft tissue.  A jamshidi needle was placed through the incision in a cranial to caudal and lateral to medial trajectory using a mallet under intermittent fluoroscopy to its stop depth. Trochar depth was confirmed under a sacral inlet view to a depth that did not breach the anterior sacral cortex. This process was also verified with AP/lateral views for safety. The guidewire was placed through the " trochar, and the needle removed over the wire. The ilium and sacral cortex were drilled and decorticated under live fluoroscopy to the 40 mm stop depth with no advancement of the guidewire noted.     A 45 mm length TransLoc screw was selected.     The selected screw implant was placed onto the  and screwed into the created channel until it was flush with the ilium transfixing and compressing the joint.  disengaged from screw.     The procedure was repeated in similar fashion just inferior to the initial screw placement without issue. The second Transloc screw selected was 40 mm. The selected screw implant was placed onto the  and screwed into the created channel until it was flush with the ilium transfixing and compressing the joint.  disengaged from screw. Final inlet, outlet, AP, lateral views saved showing excellent placement.     Hemostasis ensured. The wound was irrigated with 80 mg gentamicin-infused 500 ml saline solution. The surgery was concluded by closing fascial layer with intermittent 2.0 Vicryl sutures. The skin was closed with a running 4.0 Monocryl suture. Sterile dressing was applied with Steri-strips, sterile gauze, and Tegaderm.       Disposition:    The patient was taken to the recovery room in satisfactory condition for recovery. Discharge instructions given to patient as well as in office follow-up. Pt discharged from PACU in stable condition and ambulated out of recovery without issue with responsible adult to drive them home.        Nathaly Cantrell MD  Anesthesiologist & Interventional Pain Physician   [facility] [place]  O: [phone]  F: [phone]  2:21 PM  03/20/25

## 2025-03-20 NOTE — DISCHARGE INSTRUCTIONS
DISCHARGE INSTRUCTIONS FOR SACROILIAC JOINT FUSION     You underwent a RIGHT posterolateral percutaneous sacroiliac joint fusion today    It is recommended that you relax and limit your activity for the remainder of the day unless you have been told otherwise by your pain physician.  You should not drive a car, operate machinery, or make important legal decisions unless otherwise directed by your pain physician.  Please avoid excessive bending, twisting, or lifting >25 lb for the next 6 weeks. During this time, please take care to avoid anti-inflammatory medications such as ibuprofen, naproxen, or steroids in order to promote fusion.     For all injections, please keep the injection site dry. You may shower in 2 days; no soaking baths, hot tubs, whirlpools or swimming pools for two weeks to prevent infection.      Some discomfort, bruising or slight swelling may occur at the injection site. This is not abnormal if it occurs.  If needed you may:    -Take over the counter medication such as Tylenol.   -Apply an ice pack for 30 minutes, 2 to 3 times a day for the first 24 hours.     If you take a blood thinning medication (Coumadin, Lovenox, Fragmin,Ticlid, Plavix, Pradaxa, etc.), please discuss this with your primary care physician/cardiologist and your pain physician. These medications MUST be discontinued before you can have an procedure safely, without the risk of uncontrolled bleeding. If these medications are not discontinued for an appropriate period of time, you will not be able to receivean injection.      If you are taking Coumadin, please have your INR checked the morning of your procedure and bring the result to your appointment unless otherwise instructed. If your INR is over 1.2, your injection may need to be rescheduled to avoid uncontrolled bleeding from the needle placement.     Call [facility] at [phone] between 8am-4pm Monday - Friday if you are experiencing the following:     If you received an epidural or spinal injection:    -Headache that doesnot go away with medicine, is worse when sitting or standing up, and is greatly relieved upon lying down.   -Severe pain worse than or different than your baseline pain.   -Chills or fever (101º F or greater).   -Drainage or signs of infection at the injection site     Go directly to the Emergency Department if you are experiencing the following and received an epidural or spinal injection:   -Abrupt weakness or progressive weakness in your legs that starts after you leave the clinic.   -Abrupt severe or worsening numbness in your legs.   -Inability to urinate after the injection or loss of bowel or bladder control without the urge to defecate or urinate.     If you have a clinical question that cannot wait until your next appointment, please call [phone] between 8am-4pm Monday - Friday or send a [employer] message. We do our best to return all non-emergency messages within 24 hours, Monday - Friday. A nurse or physician will return your message.      If you need to cancel an appointment, please call the scheduling staff at [phone] during normal business hours or leave a message at least 24 hours in advance.     If you are going to be sedated for your next procedure, you MUST have responsible adult who can legally drive accompany you home. You cannot eat or drink for eight hours prior to the planned procedure if you are going to receive sedation. You may take your non-blood thinning medications with a small sip of water.

## 2025-03-20 NOTE — ANESTHESIA POSTPROCEDURE EVALUATION
Patient: Todd Small    Procedure Summary       Date: 03/20/25 Room / Location: [facility] / [facility]    Anesthesia Start: 1229 Anesthesia Stop: 1418    Procedure: Posteriolateral Sacroiliac Joint Fusion (Right) Diagnosis:       Sacroiliitis, not elsewhere classified (CMS-HCC)      (Sacroiliitis, not elsewhere classified (CMS-HCC) [M46.1])    Surgeons: Nathaly Cantrell MD Responsible Provider: Tam Parkinson DO    Anesthesia Type: general, other ASA Status: 3            Anesthesia Type: No value filed.    Vitals Value Taken Time   /88 03/20/25 1421   Temp  03/20/25 1422   Pulse 82 03/20/25 1421   Resp 13 03/20/25 1421   SpO2 98 % 03/20/25 1421   Vitals shown include unfiled device data.    Anesthesia Post Evaluation    Patient location during evaluation: bedside  Patient participation: complete - patient participated  Level of consciousness: awake  Pain score: 4  Pain management: adequate  Airway patency: patent  Two or more strategies used to mitigate risk of obstructive sleep apnea  Cardiovascular status: acceptable  Respiratory status: acceptable  Hydration status: acceptable  Postoperative Nausea and Vomiting: none  Comments: See intraoperative record for anesthetic actions related to the preoperative anesthesia plan.        No notable events documented.

## 2025-03-21 DIAGNOSIS — I50.22 CHRONIC SYSTOLIC HEART FAILURE: ICD-10-CM

## 2025-03-21 RX ORDER — SACUBITRIL AND VALSARTAN 97; 103 MG/1; MG/1
1 TABLET, FILM COATED ORAL 2 TIMES DAILY
Qty: 180 TABLET | Refills: 3 | Status: CANCELLED | OUTPATIENT
Start: 2025-03-21 | End: 2026-03-16

## 2025-03-22 ENCOUNTER — PATIENT MESSAGE (OUTPATIENT)
Dept: PRIMARY CARE | Facility: CLINIC | Age: 60
End: 2025-03-22
Payer: COMMERCIAL

## 2025-03-22 DIAGNOSIS — E03.9 ACQUIRED HYPOTHYROIDISM: ICD-10-CM

## 2025-03-24 ENCOUNTER — TELEPHONE (OUTPATIENT)
Dept: PAIN MEDICINE | Facility: CLINIC | Age: 60
End: 2025-03-24
Payer: COMMERCIAL

## 2025-03-24 RX ORDER — LEVOTHYROXINE SODIUM 150 UG/1
150 TABLET ORAL SEE ADMIN INSTRUCTIONS
Qty: 96 TABLET | Refills: 1 | OUTPATIENT
Start: 2025-03-24

## 2025-03-24 NOTE — TELEPHONE ENCOUNTER
I called Keshawn this afternoon rahul woke him up.  States the pain is a bit more than he was anticipating.  He is using ice paks and taking his oxy for pain.  I encouraged him to get up and move around a bit every few hours.  I also reminded him of his follow up appointment with Marlee  next week.  I also reminded him of my phone number and to call me back if he has any issues before his follow up appointment.

## 2025-03-25 ENCOUNTER — APPOINTMENT (OUTPATIENT)
Facility: CLINIC | Age: 60
End: 2025-03-25
Payer: COMMERCIAL

## 2025-03-26 DIAGNOSIS — I50.22 CHRONIC SYSTOLIC HEART FAILURE: ICD-10-CM

## 2025-04-03 ENCOUNTER — OFFICE VISIT (OUTPATIENT)
Dept: PAIN MEDICINE | Facility: CLINIC | Age: 60
End: 2025-04-03
Payer: COMMERCIAL

## 2025-04-03 ENCOUNTER — APPOINTMENT (OUTPATIENT)
Dept: PAIN MEDICINE | Facility: CLINIC | Age: 60
End: 2025-04-03
Payer: COMMERCIAL

## 2025-04-03 VITALS
OXYGEN SATURATION: 96 % | HEIGHT: 71 IN | SYSTOLIC BLOOD PRESSURE: 128 MMHG | BODY MASS INDEX: 33.18 KG/M2 | WEIGHT: 237 LBS | DIASTOLIC BLOOD PRESSURE: 84 MMHG | HEART RATE: 96 BPM

## 2025-04-03 DIAGNOSIS — G89.29 OTHER CHRONIC PAIN: Primary | ICD-10-CM

## 2025-04-03 DIAGNOSIS — M54.16 LUMBAR RADICULOPATHY: ICD-10-CM

## 2025-04-03 DIAGNOSIS — M46.1 SACROILIITIS, NOT ELSEWHERE CLASSIFIED: ICD-10-CM

## 2025-04-03 PROCEDURE — 3074F SYST BP LT 130 MM HG: CPT | Performed by: NURSE PRACTITIONER

## 2025-04-03 PROCEDURE — 1036F TOBACCO NON-USER: CPT | Performed by: NURSE PRACTITIONER

## 2025-04-03 PROCEDURE — 3008F BODY MASS INDEX DOCD: CPT | Performed by: NURSE PRACTITIONER

## 2025-04-03 PROCEDURE — 99214 OFFICE O/P EST MOD 30 MIN: CPT | Performed by: NURSE PRACTITIONER

## 2025-04-03 PROCEDURE — 3079F DIAST BP 80-89 MM HG: CPT | Performed by: NURSE PRACTITIONER

## 2025-04-03 RX ORDER — OXYCODONE AND ACETAMINOPHEN 7.5; 325 MG/1; MG/1
1 TABLET ORAL 4 TIMES DAILY PRN
Qty: 120 TABLET | Refills: 0 | Status: SHIPPED | OUTPATIENT
Start: 2025-04-19 | End: 2025-05-19

## 2025-04-03 ASSESSMENT — PAIN DESCRIPTION - DESCRIPTORS: DESCRIPTORS: ACHING

## 2025-04-03 ASSESSMENT — ENCOUNTER SYMPTOMS
CONFUSION: 0
ABDOMINAL DISTENTION: 0
BACK PAIN: 1
VOMITING: 0
DYSURIA: 0
ABDOMINAL PAIN: 0
SLEEP DISTURBANCE: 0
DIFFICULTY URINATING: 0
SHORTNESS OF BREATH: 0
NAUSEA: 0
COUGH: 0
CONSTITUTIONAL NEGATIVE: 1
HEMATOLOGIC/LYMPHATIC NEGATIVE: 1
DIARRHEA: 0
AGITATION: 0
PALPITATIONS: 0

## 2025-04-03 ASSESSMENT — PAIN SCALES - GENERAL
PAINLEVEL_OUTOF10: 7
PAINLEVEL_OUTOF10: 7

## 2025-04-03 ASSESSMENT — PAIN - FUNCTIONAL ASSESSMENT: PAIN_FUNCTIONAL_ASSESSMENT: 0-10

## 2025-04-03 NOTE — PROGRESS NOTES
Subjective   Patient ID: Todd Small is a 59 y.o. male who presents for follow up on SI joint pain.   HPI    He is here for a follow up from a right SI Fusion. He states overall he has about 40% improvement into his right leg and low back. He is happy how his pain is improving. He is excited to get back into the [facility]. He does well with the warm water.   He is needing refills as well. He denies side effects. His medications allow her to stay active.   Review of Systems   Constitutional: Negative.    HENT: Negative.     Respiratory:  Negative for cough and shortness of breath.    Cardiovascular:  Negative for chest pain, palpitations and leg swelling.   Gastrointestinal:  Negative for abdominal distention, abdominal pain, diarrhea, nausea and vomiting.   Endocrine: Negative for cold intolerance and heat intolerance.   Genitourinary:  Negative for difficulty urinating, dysuria and urgency.   Musculoskeletal:  Positive for back pain and gait problem.   Skin: Negative.    Hematological: Negative.    Psychiatric/Behavioral:  Negative for agitation, confusion, sleep disturbance and suicidal ideas.        Objective   Physical Exam  Constitutional:       Appearance: Normal appearance.   Musculoskeletal:      Lumbar back: Tenderness present. Decreased range of motion.   Skin:     General: Skin is warm and dry.      Comments: Incision dry and intact   Neurological:      Mental Status: He is alert and oriented to person, place, and time.      Motor: Weakness present.      Gait: Gait abnormal.   Psychiatric:         Mood and Affect: Mood normal.         Behavior: Behavior normal.         Assessment/Plan   Problem List Items Addressed This Visit             ICD-10-CM       Musculoskeletal and Injuries    Sacroiliitis, not elsewhere classified M46.1       Neuro    Chronic pain - Primary G89.29     Other Visit Diagnoses         Codes    Lumbar radiculopathy     M54.16    Relevant Medications    oxyCODONE-acetaminophen (Percocet)  7.5-325 mg tablet (Start on 4/19/2025)               I nice discussion with the patient today our plan will be as follows.    Radiology: no new imaging needed at this time.     Physically:  Will start back with pool therapy.     Psychologically:  no concern.     Medication: I will refill the patient's opioids today for 2 month.  The patient continues to see benefit and improvement in their quality of life and ability to maintain ADLs.  Patient educated about the risks of taking opioids and operating a motor vehicle.  Patient reports no adverse side effects to current medication regimen.  Current regimen does allow patient to maintain ADLs.  Patient reports no new neurologic symptoms, new pain areas, or exacerbation in pain today.  Patient reports they are happy with current treatment care path.    [place] was reviewed and was consistent with the history.    Patient has been educated on the risks, benefits, and alternatives of controlled substances as well as the proper way to store these medications.  The patient and I discussed the nature of this medication and its side effects.  We discussed tolerance, physical dependence, psychological dependence, addiction and opioid-induced hyperalgesia.  We discussed the potential need to wean from this medication.  We discussed the availability of programs that can help with this process if necessary.  We discussed safety issues related to opioids including safe storage.  We discussed the fact that the patient should not drive an automobile or operate heavy machinery while taking this medication.  A prescription for naloxone was offered to the patient.  The patient will be re-evaluated for the need to continue opioid therapy in 60-90 days.      Duration:  chronic on going.     Intervention:  He is healing well. He is stable on his medications. Follow up 2 months.     IRINEO Friedman 04/03/25 8:31 AM

## 2025-04-28 ENCOUNTER — PREP FOR PROCEDURE (OUTPATIENT)
Dept: PAIN MEDICINE | Facility: CLINIC | Age: 60
End: 2025-04-28
Payer: COMMERCIAL

## 2025-04-28 DIAGNOSIS — M46.1 SACROILIITIS, NOT ELSEWHERE CLASSIFIED: Primary | ICD-10-CM

## 2025-04-28 RX ORDER — CEFAZOLIN SODIUM 2 G/100ML
2 INJECTION, SOLUTION INTRAVENOUS ONCE
OUTPATIENT
Start: 2025-04-28 | End: 2025-04-28

## 2025-05-07 DIAGNOSIS — I50.22 CHRONIC SYSTOLIC HEART FAILURE: ICD-10-CM

## 2025-05-08 RX ORDER — CARVEDILOL 12.5 MG/1
12.5 TABLET ORAL
Qty: 180 TABLET | Refills: 3 | Status: SHIPPED | OUTPATIENT
Start: 2025-05-08 | End: 2026-05-08

## 2025-05-14 ENCOUNTER — OFFICE VISIT (OUTPATIENT)
Dept: PAIN MEDICINE | Facility: CLINIC | Age: 60
End: 2025-05-14
Payer: COMMERCIAL

## 2025-05-14 VITALS — OXYGEN SATURATION: 98 % | SYSTOLIC BLOOD PRESSURE: 128 MMHG | DIASTOLIC BLOOD PRESSURE: 74 MMHG | HEART RATE: 83 BPM

## 2025-05-14 DIAGNOSIS — M54.16 LUMBAR RADICULOPATHY: ICD-10-CM

## 2025-05-14 DIAGNOSIS — M48.061 SPINAL STENOSIS OF LUMBAR REGION WITHOUT NEUROGENIC CLAUDICATION: Primary | ICD-10-CM

## 2025-05-14 DIAGNOSIS — Z79.891 LONG TERM CURRENT USE OF OPIATE ANALGESIC: ICD-10-CM

## 2025-05-14 DIAGNOSIS — M46.1 SACROILIITIS, NOT ELSEWHERE CLASSIFIED: ICD-10-CM

## 2025-05-14 DIAGNOSIS — Z76.89 ENCOUNTER TO ESTABLISH CARE: ICD-10-CM

## 2025-05-14 PROCEDURE — 3078F DIAST BP <80 MM HG: CPT | Performed by: ANESTHESIOLOGY

## 2025-05-14 PROCEDURE — 99214 OFFICE O/P EST MOD 30 MIN: CPT | Performed by: ANESTHESIOLOGY

## 2025-05-14 PROCEDURE — 3074F SYST BP LT 130 MM HG: CPT | Performed by: ANESTHESIOLOGY

## 2025-05-14 PROCEDURE — 1036F TOBACCO NON-USER: CPT | Performed by: ANESTHESIOLOGY

## 2025-05-14 RX ORDER — OXYCODONE AND ACETAMINOPHEN 7.5; 325 MG/1; MG/1
1 TABLET ORAL 4 TIMES DAILY PRN
Qty: 120 TABLET | Refills: 0 | Status: SHIPPED | OUTPATIENT
Start: 2025-05-19 | End: 2025-06-18

## 2025-05-14 ASSESSMENT — ENCOUNTER SYMPTOMS
LOSS OF SENSATION IN FEET: 0
HEMATOLOGIC/LYMPHATIC NEGATIVE: 1
BACK PAIN: 1
RESPIRATORY NEGATIVE: 1
OCCASIONAL FEELINGS OF UNSTEADINESS: 0
DEPRESSION: 0
EYES NEGATIVE: 1
GASTROINTESTINAL NEGATIVE: 1
CARDIOVASCULAR NEGATIVE: 1
WEAKNESS: 1
PSYCHIATRIC NEGATIVE: 1
CONSTITUTIONAL NEGATIVE: 1
ENDOCRINE NEGATIVE: 1
NUMBNESS: 1

## 2025-05-14 ASSESSMENT — PAIN SCALES - GENERAL
PAINLEVEL_OUTOF10: 7
PAINLEVEL_OUTOF10: 7

## 2025-05-14 ASSESSMENT — PAIN - FUNCTIONAL ASSESSMENT: PAIN_FUNCTIONAL_ASSESSMENT: 0-10

## 2025-05-14 NOTE — PROGRESS NOTES
PAIN MANAGEMENT FOLLOW-UP OFFICE NOTE    Date of Service: 5/14/2025    SUBJECTIVE    CHIEF COMPLAINT: LBP    HISTORY OF PRESENT ILLNESS    Todd Small is a 60 y.o. male with PMH CHF 2/2 former EtOH use, former smoker, CKD, MDD, papillary thyroid ca s/p thyroidectomy/RT with hypothyroidism who presents for F/U    Since his last visit, pt maintains L-sided LBP radiating to L thigh. Pain rated as severe and pt anticipates L SI fusion currently scheduled for 7/1.     Pt denies new-onset bowel/bladder incontinence.  Pt denies recent infection, allergy to Latex/iodine/contrast. Patient is currently taking the following blood thinner(s): N/A    Procedure log:  -R SI fusion 3/20/25: >50% ongoing relief  -BL L3-4 TFESI 3/4/25: >50% ongoing relief.   -Dx BL SIJ injxn #2 1/7/25: 100% relief  -BL L3-4 TFESI 11/12/24: 70% relief 3 mo  -Dx BL SIJ injxn #1 10/29/24: 100% relief 3 d  -BL SIJ  CSI 9/3/24: 100% relief 3 hours before return to baseline   -BL L3-4 TFESI 7/30/24: 50% relief in his legs, but 30% relief in LB      REVIEW OF SYSTEMS  Review of Systems   Constitutional: Negative.    HENT: Negative.     Eyes: Negative.    Respiratory: Negative.     Cardiovascular: Negative.    Gastrointestinal: Negative.    Endocrine: Negative.    Musculoskeletal:  Positive for back pain.   Skin: Negative.    Neurological:  Positive for weakness and numbness.   Hematological: Negative.    Psychiatric/Behavioral: Negative.         PAST MEDICAL HISTORY  Past Medical History:   Diagnosis Date    Anxiety 4/01/23    Back pain     Cancer (Multi) 10/01/0-    CHF (congestive heart failure) 10/01/23    Depression 10/01/23    Extremity pain     Headache     Hypothyroidism     Low back pain     Nonischemic cardiomyopathy (Multi)     Personal history of irradiation 1/10/95    Thyroid cancer (Multi)      Past Surgical History:   Procedure Laterality Date    APPENDECTOMY  10/01/96    CARDIAC CATHETERIZATION      CIRCUMCISION, PRIMARY      COLONOSCOPY   09/11/2020    HERNIA REPAIR      THYROIDECTOMY      VASECTOMY       Family History   Problem Relation Name Age of Onset    Heart disease Mother Kim     Heart disease Sister (1)     Other (pacemaker) Sister (1)     Heart disease Brother Skye     Atrial fibrillation Brother Gilberto        CURRENT MEDICATIONS  Current Outpatient Medications   Medication Sig Dispense Refill    acetaminophen (Tylenol) 325 mg tablet Take 1 tablet (325 mg) by mouth every 6 hours if needed for mild pain (1 - 3) or moderate pain (4 - 6).      amLODIPine (Norvasc) 5 mg tablet Take 1 tablet (5 mg) by mouth 2 times a day. 180 tablet 3    carvedilol (Coreg) 12.5 mg tablet TAKE 1 TABLET BY MOUTH TWO TIMES A DAY WITH FOOD 180 tablet 3    cholecalciferol (Vitamin D3) 25 mcg (1000 units) tablet Take 1 tablet (1,000 Units) by mouth once daily.      furosemide (Lasix) 40 mg tablet TAKE ONE TABLET BY MOUTH ONCE A DAY 90 tablet 3    levothyroxine (Synthroid, Levoxyl) 150 mcg tablet TAKE 1 TABLET BY MOUTH ON MONDAY THROUGH SATURDAY, AND TAKE 1 AND 1/2 TABLETS ON SUNDAY AS DIRECTED 96 tablet 0    naloxone (Narcan) 4 mg/0.1 mL nasal spray Administer 1 spray (4 mg) into affected nostril(s) if needed for opioid reversal. May repeat every 2-3 minutes if needed, alternating nostrils, until medical assistance becomes available. 2 each 0    nystatin (Mycostatin) 100,000 unit/gram powder Apply 1 Application topically 2 times a day. 60 g 1    oxyCODONE-acetaminophen (Percocet) 7.5-325 mg tablet Take 1 tablet by mouth 4 times a day as needed for severe pain (7 - 10). Do not fill before April 19, 2025. 120 tablet 0    potassium chloride CR (Klor-Con) 10 mEq ER tablet Take 1 tablet (10 mEq) by mouth 2 times a day. 180 tablet 3    sacubitriL-valsartan (Entresto)  mg tablet Take 1 tablet by mouth 2 times a day. 180 tablet 3    venlafaxine XR (Effexor-XR) 150 mg 24 hr capsule Take 1 oral tablet once a day (take with 75mg capsule for a total of 225mg daily) 30  capsule 0    venlafaxine XR (Effexor-XR) 75 mg 24 hr capsule Take one capsule by mouth once a day (take with 150mg capsule for a total of 225mg daily) 90 capsule 0    ALPRAZolam (Xanax) 0.25 mg tablet Take 2 tablets (0.5 mg) by mouth 3 times a day as needed for anxiety for 7 days, THEN 1 tablet (0.25 mg) 4 times a day as needed for anxiety for 7 days, THEN 1 tablet (0.25 mg) 2 times a day as needed for anxiety for 7 days, THEN 1 tablet (0.25 mg) once daily as needed for anxiety for up to 7 days. 91 tablet 0    oxyCODONE-acetaminophen (Percocet) 7.5-325 mg tablet Take 1 tablet by mouth every 4 hours if needed for severe pain (7 - 10) for 7 days, THEN 1 tablet 4 times a day as needed for severe pain (7 - 10) 134 tablet 0     No current facility-administered medications for this visit.       ALLERGIES AND DRUG REACTIONS  Allergies   Allergen Reactions    Fluconazole Confusion     PT takes alprazolam as well.  Last fill pt (Jan 25) stated he was disoriented, confused with additional side effects.   Cant not take both together    Latex Rash    Sulfa (Sulfonamide Antibiotics) Rash and Unknown          OBJECTIVE  Visit Vitals  /74   Pulse 83   SpO2 98%   Smoking Status Former       Last Recorded Pain Score (if available):       Pain Score:   7     Physical Exam  General: Sitting in chair, NAD, antalgic gait with cane and hunched forward    Head: NCAT  Eyes: Sclera/conjunctiva clear, EOMI, PERRL  Nose/mouth: MMM  CV: Good distal pulses  Lungs: Good/equal chest excursion  Abdomen: Soft, ND  Ext: No cyanosis/edema  MSK: L-spine alignment: unremarkable, BL paraspinal m/L>>R PSIS TTP, L-spine ROM: ext limited by pain    Neuro: AAOx3, CN grossly nl    Psych: affect nl, affect nl  Skin: no rash      REVIEW OF LABORATORY DATA  I have reviewed the following lab results:  WBC   Date Value Ref Range Status   03/06/2025 12.2 (H) 4.4 - 11.3 x10*3/uL Final     RBC   Date Value Ref Range Status   03/06/2025 4.67 4.50 - 5.90  "x10*6/uL Final     Hemoglobin   Date Value Ref Range Status   03/06/2025 14.6 13.5 - 17.5 g/dL Final     Hematocrit   Date Value Ref Range Status   03/06/2025 42.6 41.0 - 52.0 % Final     MCV   Date Value Ref Range Status   03/06/2025 91 80 - 100 fL Final     MCH   Date Value Ref Range Status   03/06/2025 31.3 26.0 - 34.0 pg Final     MCHC   Date Value Ref Range Status   03/06/2025 34.3 32.0 - 36.0 g/dL Final     RDW   Date Value Ref Range Status   03/06/2025 12.2 11.5 - 14.5 % Final     Platelets   Date Value Ref Range Status   03/06/2025 314 150 - 450 x10*3/uL Final     MPV   Date Value Ref Range Status   11/22/2022 9.4 7.0 - 12.6 CU Final     Sodium   Date Value Ref Range Status   03/06/2025 129 (L) 136 - 145 mmol/L Final     Potassium   Date Value Ref Range Status   03/06/2025 4.4 3.5 - 5.3 mmol/L Final     Bicarbonate   Date Value Ref Range Status   03/06/2025 29 21 - 32 mmol/L Final     Urea Nitrogen   Date Value Ref Range Status   03/06/2025 17 6 - 23 mg/dL Final     Calcium   Date Value Ref Range Status   03/06/2025 9.2 8.6 - 10.3 mg/dL Final     No results found for: \"PROTIME\", \"PTT\", \"INR\", \"FIBRINOGEN\"      REVIEW OF RADIOLOGY   I have reviewed the following:  Radiology Studies           MRI L-spine 7/3/24:         ASSESSMENT & PLAN  Todd Small is a 60 y.o. male with PMH CHF 2/2 former EtOH use, former smoker, CKD, MDD, papillary thyroid ca s/p thyroidectomy/RT with hypothyroidism who presents  for F/U    1) Lumbar spinal stenosis, improved  -LBP radiating down BLE with standing/walking affecting gait, function, ADLs, and QoL  -Refractive to Tylenol, biofreeze, SNRI, Norco  -NSAIDs contraindicated 2/2 CKD. MDP may worsen rash  -Unable to meaningfully participate in PT 2/2 pain severity and late-stage CHF  -MRI L-spine 7/3/24:  multilevel spondylosis featuring moderate L3-4 stenosis  -BL L3-4 TFESI 3/4/25: >50% ongoing relief.   -Consider new MRI if no improvement    2) Sacroiliitis, improving  -BL " SIJ-mediated pain reproduced on multiple SIJ-provocative maneuvers  -Refractive to yrs of conservative tx including Tylenol, biofreeze, Norco  -s/f L SIJ fusion 7/1. Work-up:  --CT pelvis 10/15/24: mild BL hip OA, mild BL SIJ OA  --Therapeutic BL SIJ CSI 9/3/24: 100% relief 3 h  --Dx BL SIJ injxn #1 10/29/24: 100% relief 3 d  --Dx BL SIJ injxn #2 1/7/25: 100% relief  --R SI fusion 3/20/25: >50% ongoing relief     3) Long-term opiate use  -2/2 chronic LB and leg pain refractive to conservative, interventional tx  -On Percocet 7.5 mg QID PRN (45 MME) with moderate relief. Denies SE. Requests RF  - reviewed/appropriate. Reviewed DS 1/8/25: OK. He did fill alprazolam 1 mg x140 tabs from November psych script, which pt says was not to be taken regularly, but only as needed in certain anxiety-provoking situations like flying on a plane. Warning given not to resume benzo as he was already weaned off. He expressed he would bring in his tabs Xanax for destruction. UDS today  -Educated on/prescribed Narcan 8/12/24 2/2 benzo co-prescription  -RF Percocet x30 d  -RTC 1 mo    4) Encounter to est care  -PCP departed  for [facility] and pt seeking new, local PCP  -Referral to Dr Amado Lucas's visit involved continuation of chronic pain care. In the context of the complexity of this patient's chronic pain diagnosis, long-term expectations and care planning discussed. Adequate time taken to ensure patient understanding and answer questions. Imaging studies ordered are placed do elucidate the patient's diagnosis, but also to evaluate the patient's candidacy for procedural and surgical interventions. The risks and benefits of these potential interventions are detailed as above.                    Nathaly Cantrell MD  Anesthesiologist & Interventional Pain Physician   [facility] [place]  O: [phone]  F: [phone]  8:13 AM  05/14/25

## 2025-05-14 NOTE — PROGRESS NOTES
MEDICATION NAME: percocet  STRENGTH: 7.5-325 mg  LAST FILL DATE: 4/10/2025  DATE LAST TAKEN: 2025  QUANTITY FILLED: 120  QUANTITY REMAININ  COUNT COMPLETED BY: MINH BEAL MA and BECKIE HARTLEY LPN

## 2025-05-16 LAB
1OH-MIDAZOLAM UR-MCNC: NEGATIVE NG/ML
7AMINOCLONAZEPAM UR-MCNC: NEGATIVE NG/ML
A-OH ALPRAZ UR-MCNC: NEGATIVE NG/ML
A-OH-TRIAZOLAM UR-MCNC: NEGATIVE NG/ML
AMPHETAMINES UR QL: NEGATIVE NG/ML
BARBITURATES UR QL: NEGATIVE NG/ML
BZE UR QL: NEGATIVE NG/ML
CODEINE UR-MCNC: NEGATIVE NG/ML
CREAT UR-MCNC: 231.1 MG/DL
DRUG SCREEN COMMENT UR-IMP: ABNORMAL
EDDP UR-MCNC: NEGATIVE NG/ML
FENTANYL UR-MCNC: NEGATIVE NG/ML
HYDROCODONE UR-MCNC: NEGATIVE NG/ML
HYDROMORPHONE UR-MCNC: NEGATIVE NG/ML
LORAZEPAM UR-MCNC: NEGATIVE NG/ML
METHADONE UR-MCNC: NEGATIVE NG/ML
MORPHINE UR-MCNC: NEGATIVE NG/ML
NORDIAZEPAM UR-MCNC: NEGATIVE NG/ML
NORFENTANYL UR-MCNC: NEGATIVE NG/ML
NORHYDROCODONE UR CFM-MCNC: NEGATIVE NG/ML
NOROXYCODONE UR CFM-MCNC: 2240 NG/ML
NORTRAMADOL UR-MCNC: NEGATIVE NG/ML
OH-ETHYLFLURAZ UR-MCNC: NEGATIVE NG/ML
OXAZEPAM UR-MCNC: NEGATIVE NG/ML
OXIDANTS UR QL: NEGATIVE MCG/ML
OXYCODONE UR CFM-MCNC: 2130 NG/ML
OXYMORPHONE UR CFM-MCNC: 1430 NG/ML
PCP UR QL: NEGATIVE NG/ML
PH UR: 5.7 [PH] (ref 4.5–9)
QUEST 6 ACETYLMORPHINE: NEGATIVE NG/ML
QUEST NOTES AND COMMENTS: ABNORMAL
QUEST ZOLPIDEM: NEGATIVE NG/ML
TEMAZEPAM UR-MCNC: NEGATIVE NG/ML
THC UR QL: NEGATIVE NG/ML
TRAMADOL UR-MCNC: NEGATIVE NG/ML
ZOLPIDEM PHENYL-4-CARB UR CFM-MCNC: NEGATIVE NG/ML

## 2025-05-19 ENCOUNTER — CLINICAL SUPPORT (OUTPATIENT)
Dept: PAIN MEDICINE | Facility: CLINIC | Age: 60
End: 2025-05-19
Payer: COMMERCIAL

## 2025-05-20 NOTE — PROGRESS NOTES
MEDICATION NAME: Alprazolam  STRENGTH: 1 mg  LAST FILL DATE: 2025  DATE LAST TAKEN: did not take  QUANTITY FILLED: 120  QUANTITY REMAININ  COUNT COMPLETED BY: TAY JACKSON RN and BECKIE HARTLEY LPN      UDS LAST COMPLETED:   CONTROLLED SUBSTANCES AGREEMENT LAST SIGNED:   ORT LAST COMPLETED:  Modified Osw[employer] disability form filled out annually.

## 2025-06-12 ENCOUNTER — OFFICE VISIT (OUTPATIENT)
Dept: URGENT CARE | Age: 60
End: 2025-06-12
Payer: COMMERCIAL

## 2025-06-12 VITALS
HEART RATE: 74 BPM | SYSTOLIC BLOOD PRESSURE: 142 MMHG | OXYGEN SATURATION: 97 % | HEIGHT: 71 IN | WEIGHT: 195 LBS | TEMPERATURE: 99 F | DIASTOLIC BLOOD PRESSURE: 82 MMHG | BODY MASS INDEX: 27.3 KG/M2

## 2025-06-12 DIAGNOSIS — J01.00 ACUTE NON-RECURRENT MAXILLARY SINUSITIS: ICD-10-CM

## 2025-06-12 DIAGNOSIS — H66.92 LEFT OTITIS MEDIA, UNSPECIFIED OTITIS MEDIA TYPE: Primary | ICD-10-CM

## 2025-06-12 DIAGNOSIS — R05.1 ACUTE COUGH: ICD-10-CM

## 2025-06-12 DIAGNOSIS — B34.8 RHINOVIRUS: ICD-10-CM

## 2025-06-12 LAB
POC HUMAN RHINOVIRUS PCR: POSITIVE
POC INFLUENZA A VIRUS PCR: NEGATIVE
POC INFLUENZA B VIRUS PCR: NEGATIVE
POC RESPIRATORY SYNCYTIAL VIRUS PCR: NEGATIVE
POC SARS-COV-2 AG BINAX: NORMAL
POC STREPTOCOCCUS PYOGENES (GROUP A STREP) PCR: NEGATIVE

## 2025-06-12 RX ORDER — AMOXICILLIN AND CLAVULANATE POTASSIUM 875; 125 MG/1; MG/1
875 TABLET, FILM COATED ORAL 2 TIMES DAILY
Qty: 14 TABLET | Refills: 0 | Status: SHIPPED | OUTPATIENT
Start: 2025-06-12 | End: 2025-06-19

## 2025-06-12 ASSESSMENT — ENCOUNTER SYMPTOMS
SINUS PRESSURE: 1
RHINORRHEA: 1
SINUS PAIN: 1
FEVER: 1

## 2025-06-12 NOTE — PATIENT INSTRUCTIONS
Augmentin 1 tablet 2 times a day for 7 days.  Zyrtec please take as directed on label.  Please take for 7 days.  Please take over-the-counter probiotic tablet please take for 7 days.  Please call your primary care doctor next 5 to 7 days.  If worsening symptoms, please go to local emergency department for further evaluation    Please follow up with your primary provider within one week if symptoms do not improve.  You may schedule an appointment online at [email or web address] or call [phone]. Go to the Emergency Department if symptoms significantly worsen or if you develop chest pain or shortness of breath.

## 2025-06-12 NOTE — PROGRESS NOTES
"Subjective   Patient ID: Todd Small is a 60 y.o. male. They present today with a chief complaint of Illness (Low grade fever. Coughing up some phlegm - Entered by patient) and Cough (Wet cough with headache for ear discomfort in left ear for 4 days).    History of Present Illness  Todd Small is a 60 y.o. male who presents to the clinic for 5 days of sinus pressure, sinus pain, left ear discomfort, rhinorrhea, postnasal drip, low-grade fever.  Patient is taking over-the-counter medication with little relief. Pt denies any chest pain, sob, N/V at this time in clinic.             Past Medical History  Allergies as of 06/12/2025 - Reviewed 06/12/2025   Allergen Reaction Noted    Fluconazole Confusion 02/28/2025    Latex Rash 09/04/2023    Sulfa (sulfonamide antibiotics) Rash and Unknown 09/04/2023       Prescriptions Prior to Admission[1]     Medical History[2]    Surgical History[3]     reports that he quit smoking about 1 years ago. His smoking use included cigarettes. He started smoking about 16 years ago. He has a 15 pack-year smoking history. He has never used smokeless tobacco. He reports that he does not currently use alcohol. He reports that he does not use drugs.    Review of Systems  Review of Systems   Constitutional:  Positive for fever.   HENT:  Positive for congestion, ear pain, postnasal drip, rhinorrhea, sinus pressure and sinus pain.    All other systems reviewed and are negative.                                 Objective    Vitals:    06/12/25 0813   BP: 94/52   Pulse: 74   Temp: 37.2 °C (99 °F)   SpO2: 97%   Weight: 88.5 kg (195 lb)   Height: 1.803 m (5' 11\")     No LMP for male patient.    Physical Exam  Constitutional:       Appearance: Normal appearance.   HENT:      Right Ear: Tympanic membrane normal.      Left Ear: Tympanic membrane is erythematous.      Nose:      Right Sinus: Maxillary sinus tenderness and frontal sinus tenderness present.      Left Sinus: Maxillary sinus tenderness " and frontal sinus tenderness present.      Mouth/Throat:      Pharynx: Posterior oropharyngeal erythema and postnasal drip present.   Cardiovascular:      Rate and Rhythm: Normal rate and regular rhythm.   Pulmonary:      Breath sounds: Rales present.      Comments: Rales/crackles lower lobes, bilateral  Neurological:      Mental Status: He is alert.         Procedures    Point of Care Test & Imaging Results from this visit  Results for orders placed or performed in visit on 06/12/25   POCT SPOTFIRE R/ST Panel Mini w/Strep A ([email or web address]) manually resulted   Result Value Ref Range    POC Group A Strep, PCR Negative Negative    POC Respiratory Syncytial Virus PCR Negative Negative    POC Influenza A Virus PCR Negative Negative    POC Influenza B Virus PCR Negative Negative    POC Human Rhinovirus PCR Positive (A) Negative   POCT Covid-19 Rapid Antigen   Result Value Ref Range    POC DOMI-COV-2 AG  Presumptive negative test for SARS-CoV-2 (no antigen detected)     Presumptive negative test for SARS-CoV-2 (no antigen detected)      Imaging  No results found.    Cardiology, Vascular, and Other Imaging  No other imaging results found for the past 2 days      Diagnostic study results (if any) were reviewed by IRINEO Soctt.    Assessment/Plan   Allergies, medications, history, and pertinent labs/EKGs/Imaging reviewed by IRINEO Scott.     Medical Decision Making   History and examination consistent with acute uncomplicated sinusitis. No indication for labs or imaging at this time. No evidence of sepsis, strep pharyngitis, or pneumonia. Counseled patient/family on antibiotic treatment and supportive measures at home. Patient advised to return to clinic or present to ED if symptoms change or worsen. Otherwise follow-up with PCP. Patient verbalized understanding and agrees with plan.       History and examination consistent with acute uncomplicated Otitis media. No evidence of TM perforation, otitis  externa, mastoiditis, or sepsis. Counseled patient/family on treatment plan with oral antibiotics and supportive measures at home. Return to clinic or present to ED if symptoms change or worsen. Otherwise follow-up with PCP. Patient verbalized understanding and agrees with plan.     Advised patient to further evaluate in the ER for any crackles from auscultation.  Patient states he has a history of heart failure.  Patient declined.  Patient signed AMA form.  Patient has capacity make informed medical decisions.  Case reviewed with supervising physician Dr. Lanier.    Orders and Diagnoses  Diagnoses and all orders for this visit:  Left otitis media, unspecified otitis media type  -     amoxicillin-clavulanate (Augmentin) 875-125 mg tablet; Take 1 tablet (875 mg of amoxicillin) by mouth 2 times a day for 7 days.  Acute cough  -     POCT SPOTFIRE R/ST Panel Mini w/Strep A ([facility]) manually resulted  -     POCT Covid-19 Rapid Antigen  Acute non-recurrent maxillary sinusitis  -     amoxicillin-clavulanate (Augmentin) 875-125 mg tablet; Take 1 tablet (875 mg of amoxicillin) by mouth 2 times a day for 7 days.  Rhinovirus      Medical Admin Record      Patient disposition: Home    Electronically signed by Isidro Nolen, APRN-CNP  9:09 AM           [1] (Not in a hospital admission)   [2]   Past Medical History:  Diagnosis Date    Anxiety 4/01/23    Back pain     Cancer (Multi) 10/01/0-    CHF (congestive heart failure) 10/01/23    Depression 10/01/23    Extremity pain     Headache     Hypothyroidism     Low back pain     Nonischemic cardiomyopathy (Multi)     Personal history of irradiation 1/10/95    Thyroid cancer (Multi)    [3]   Past Surgical History:  Procedure Laterality Date    APPENDECTOMY  10/01/96    CARDIAC CATHETERIZATION      CIRCUMCISION, PRIMARY      COLONOSCOPY  09/11/2020    HERNIA REPAIR      THYROIDECTOMY      VASECTOMY

## 2025-06-13 ENCOUNTER — TELEPHONE (OUTPATIENT)
Dept: URGENT CARE | Age: 60
End: 2025-06-13

## 2025-06-17 ENCOUNTER — PRE-ADMISSION TESTING (OUTPATIENT)
Dept: PREADMISSION TESTING | Facility: HOSPITAL | Age: 60
End: 2025-06-17
Payer: COMMERCIAL

## 2025-06-17 ENCOUNTER — LAB (OUTPATIENT)
Dept: LAB | Facility: HOSPITAL | Age: 60
End: 2025-06-17
Payer: COMMERCIAL

## 2025-06-17 VITALS
SYSTOLIC BLOOD PRESSURE: 130 MMHG | HEIGHT: 71 IN | RESPIRATION RATE: 18 BRPM | WEIGHT: 239.2 LBS | OXYGEN SATURATION: 98 % | DIASTOLIC BLOOD PRESSURE: 91 MMHG | TEMPERATURE: 97 F | BODY MASS INDEX: 33.49 KG/M2 | HEART RATE: 98 BPM

## 2025-06-17 DIAGNOSIS — Z01.818 ENCOUNTER FOR OTHER PREPROCEDURAL EXAMINATION: Primary | ICD-10-CM

## 2025-06-17 DIAGNOSIS — Z01.818 PRE-OP EXAMINATION: Primary | ICD-10-CM

## 2025-06-17 LAB
ANION GAP SERPL CALCULATED.3IONS-SCNC: 14 MMOL/L (ref 10–20)
BASOPHILS # BLD AUTO: 0.1 X10*3/UL (ref 0–0.1)
BASOPHILS NFR BLD AUTO: 1.1 %
BUN SERPL-MCNC: 10 MG/DL (ref 6–23)
CALCIUM SERPL-MCNC: 9.3 MG/DL (ref 8.6–10.3)
CHLORIDE SERPL-SCNC: 100 MMOL/L (ref 98–107)
CO2 SERPL-SCNC: 26 MMOL/L (ref 21–32)
CREAT SERPL-MCNC: 1.08 MG/DL (ref 0.5–1.3)
EGFRCR SERPLBLD CKD-EPI 2021: 79 ML/MIN/1.73M*2
EOSINOPHIL # BLD AUTO: 0.32 X10*3/UL (ref 0–0.7)
EOSINOPHIL NFR BLD AUTO: 3.5 %
ERYTHROCYTE [DISTWIDTH] IN BLOOD BY AUTOMATED COUNT: 11.8 % (ref 11.5–14.5)
GLUCOSE SERPL-MCNC: 98 MG/DL (ref 74–99)
HCT VFR BLD AUTO: 39.6 % (ref 41–52)
HGB BLD-MCNC: 13.2 G/DL (ref 13.5–17.5)
IMM GRANULOCYTES # BLD AUTO: 0.04 X10*3/UL (ref 0–0.7)
IMM GRANULOCYTES NFR BLD AUTO: 0.4 % (ref 0–0.9)
LYMPHOCYTES # BLD AUTO: 1.59 X10*3/UL (ref 1.2–4.8)
LYMPHOCYTES NFR BLD AUTO: 17.2 %
MCH RBC QN AUTO: 30.9 PG (ref 26–34)
MCHC RBC AUTO-ENTMCNC: 33.3 G/DL (ref 32–36)
MCV RBC AUTO: 93 FL (ref 80–100)
MONOCYTES # BLD AUTO: 0.6 X10*3/UL (ref 0.1–1)
MONOCYTES NFR BLD AUTO: 6.5 %
NEUTROPHILS # BLD AUTO: 6.61 X10*3/UL (ref 1.2–7.7)
NEUTROPHILS NFR BLD AUTO: 71.3 %
NRBC BLD-RTO: 0 /100 WBCS (ref 0–0)
PLATELET # BLD AUTO: 290 X10*3/UL (ref 150–450)
POTASSIUM SERPL-SCNC: 4.7 MMOL/L (ref 3.5–5.3)
RBC # BLD AUTO: 4.27 X10*6/UL (ref 4.5–5.9)
SODIUM SERPL-SCNC: 135 MMOL/L (ref 136–145)
WBC # BLD AUTO: 9.3 X10*3/UL (ref 4.4–11.3)

## 2025-06-17 PROCEDURE — 36415 COLL VENOUS BLD VENIPUNCTURE: CPT

## 2025-06-17 PROCEDURE — 80048 BASIC METABOLIC PNL TOTAL CA: CPT

## 2025-06-17 PROCEDURE — 87081 CULTURE SCREEN ONLY: CPT | Mod: WESLAB

## 2025-06-17 PROCEDURE — 99214 OFFICE O/P EST MOD 30 MIN: CPT

## 2025-06-17 PROCEDURE — 85025 COMPLETE CBC W/AUTO DIFF WBC: CPT

## 2025-06-17 RX ORDER — CHLORHEXIDINE GLUCONATE ORAL RINSE 1.2 MG/ML
SOLUTION DENTAL
Qty: 473 ML | Refills: 0 | Status: SHIPPED | OUTPATIENT
Start: 2025-06-17 | End: 2025-07-01

## 2025-06-17 ASSESSMENT — ENCOUNTER SYMPTOMS
HEMATOLOGIC/LYMPHATIC NEGATIVE: 1
SINUS PRESSURE: 1
SINUS PAIN: 1
EYES NEGATIVE: 1
RHINORRHEA: 1
PSYCHIATRIC NEGATIVE: 1
ENDOCRINE NEGATIVE: 1
CONSTITUTIONAL NEGATIVE: 1
CARDIOVASCULAR NEGATIVE: 1
ALLERGIC/IMMUNOLOGIC NEGATIVE: 1
BACK PAIN: 1
GASTROINTESTINAL NEGATIVE: 1

## 2025-06-17 ASSESSMENT — DUKE ACTIVITY SCORE INDEX (DASI)
CAN YOU TAKE CARE OF YOURSELF (EAT, DRESS, BATHE, OR USE TOILET): YES
DASI METS SCORE: 4
CAN YOU DO HEAVY WORK AROUND THE HOUSE LIKE SCRUBBING FLOORS OR LIFTING AND MOVING HEAVY FURNITURE: NO
CAN YOU HAVE SEXUAL RELATIONS: NO
CAN YOU DO LIGHT WORK AROUND THE HOUSE LIKE DUSTING OR WASHING DISHES: YES
CAN YOU DO MODERATE WORK AROUND THE HOUSE LIKE VACUUMING, SWEEPING FLOORS OR CARRYING GROCERIES: NO
TOTAL_SCORE: 9.95
CAN YOU DO YARD WORK LIKE RAKING LEAVES, WEEDING OR PUSHING A MOWER: NO
CAN YOU CLIMB A FLIGHT OF STAIRS OR WALK UP A HILL: NO
CAN YOU PARTICIPATE IN STRENOUS SPORTS LIKE SWIMMING, SINGLES TENNIS, FOOTBALL, BASKETBALL, OR SKIING: NO
CAN YOU WALK INDOORS, SUCH AS AROUND YOUR HOUSE: YES
CAN YOU WALK A BLOCK OR TWO ON LEVEL GROUND: YES
CAN YOU RUN A SHORT DISTANCE: NO
CAN YOU PARTICIPATE IN MODERATE RECREATIONAL ACTIVITIES LIKE GOLF, BOWLING, DANCING, DOUBLES TENNIS OR THROWING A BASEBALL OR FOOTBALL: NO

## 2025-06-17 ASSESSMENT — PAIN DESCRIPTION - DESCRIPTORS: DESCRIPTORS: ACHING;THROBBING

## 2025-06-17 ASSESSMENT — PAIN - FUNCTIONAL ASSESSMENT: PAIN_FUNCTIONAL_ASSESSMENT: 0-10

## 2025-06-17 ASSESSMENT — PAIN SCALES - GENERAL: PAINLEVEL_OUTOF10: 5 - MODERATE PAIN

## 2025-06-17 NOTE — PREPROCEDURE INSTRUCTIONS
Medication List            Accurate as of June 17, 2025  8:50 AM. Always use your most recent med list.                acetaminophen 325 mg tablet  Commonly known as: Tylenol  Medication Adjustments for Surgery: Take/Use as prescribed     ALPRAZolam 0.25 mg tablet  Commonly known as: Xanax  Take 2 tablets (0.5 mg) by mouth 1 time if needed for anxiety for up to 1 dose. Do not fill before June 30, 2025.  Start taking on: June 30, 2025  Medication Adjustments for Surgery: Take/Use as prescribed     amLODIPine 5 mg tablet  Commonly known as: Norvasc  Take 1 tablet (5 mg) by mouth 2 times a day.  Medication Adjustments for Surgery: Take on the morning of surgery     amoxicillin-clavulanate 875-125 mg tablet  Commonly known as: Augmentin  Take 1 tablet (875 mg of amoxicillin) by mouth 2 times a day for 7 days.  Medication Adjustments for Surgery: Take/Use as prescribed     carvedilol 12.5 mg tablet  Commonly known as: Coreg  TAKE 1 TABLET BY MOUTH TWO TIMES A DAY WITH FOOD  Medication Adjustments for Surgery: Take on the morning of surgery     furosemide 40 mg tablet  Commonly known as: Lasix  TAKE ONE TABLET BY MOUTH ONCE A DAY  Medication Adjustments for Surgery: Take on the morning of surgery     levothyroxine 150 mcg tablet  Commonly known as: Synthroid, Levoxyl  TAKE 1 TABLET BY MOUTH ON MONDAY THROUGH SATURDAY, AND TAKE 1 AND 1/2 TABLETS ON SUNDAY AS DIRECTED  Medication Adjustments for Surgery: Take on the morning of surgery     naloxone 4 mg/0.1 mL nasal spray  Commonly known as: Narcan  Administer 1 spray (4 mg) into affected nostril(s) if needed for opioid reversal. May repeat every 2-3 minutes if needed, alternating nostrils, until medical assistance becomes available.     Nystop 100,000 unit/gram powder  Generic drug: nystatin  Apply 1 Application topically 2 times a day.     oxyCODONE-acetaminophen 7.5-325 mg tablet  Commonly known as: Percocet  Take 1 tablet by mouth 4 times a day as needed for severe  pain (7 - 10). Do not fill before May 19, 2025.  Medication Adjustments for Surgery: Take/Use as prescribed     potassium chloride CR 10 mEq ER tablet  Commonly known as: Klor-Con  Take 1 tablet (10 mEq) by mouth 2 times a day.  Medication Adjustments for Surgery: Do Not take on the morning of surgery     sacubitriL-valsartan  mg tablet  Commonly known as: Entresto  Take 1 tablet by mouth 2 times a day.  Medication Adjustments for Surgery: Do Not take on the morning of surgery     * venlafaxine XR 75 mg 24 hr capsule  Commonly known as: Effexor-XR  Take one capsule by mouth once a day (take with 150mg capsule for a total of 225mg daily)  Medication Adjustments for Surgery: Take on the morning of surgery     * venlafaxine XR 37.5 mg 24 hr capsule  Commonly known as: Effexor XR  Take 1 capsule (37.5 mg) by mouth once daily. Do not crush or chew. To be taken with 75 mg capsule for total of 112.5 mg daily  Medication Adjustments for Surgery: Take on the morning of surgery     Vitamin D3 25 mcg (1,000 units) tablet  Generic drug: cholecalciferol  Additional Medication Adjustments for Surgery: Take last dose 7 days before surgery           * This list has 2 medication(s) that are the same as other medications prescribed for you. Read the directions carefully, and ask your doctor or other care provider to review them with you.                      Preoperative Fasting Guidelines    Why must I stop eating and drinking near surgery time?  With sedation, food or liquid in your stomach can enter your lungs causing serious complications  Increases nausea and vomiting    When do I need to stop eating and drinking before my surgery?  Do not eat any food after midnight the night before your surgery/procedure.  You may have up to 13.5 ounces of clear liquid until TWO hours before your instructed arrival time to the hospital.  This includes water, black tea/coffee, (no milk or cream) apple juice, and electrolyte drinks  (Gatorade)  You may chew gum until TWO hours before your surgery/procedure    PAT DISCHARGE INSTRUCTIONS    The Same Day Surgery (SDS) Department of the hospital where your procedure will be performed will contact you after 2:00 PM the day before your surgery. If you are scheduled on a Monday, or a Tuesday following a Monday holiday, they will call on the last business day prior to your surgery.  Please check your voicemail for any missed messages.    [facility]  [address] [place] [address] [phone]  Second Floor      [facility]  [address] [place] [address], [address] [phone]  Second Floor  *PLEASE CALL ABOVE NUMBER FOR ARRIVAL TIME       [facility]  [address]  [place] [id number] [phone]    Please let your surgeon know if:      You develop any open sores, shingles, burning or painful urination as these may increase your risk of an infection.   You no longer wish to have the surgery.   Any other personal circumstances change that may lead to the need to cancel or defer this surgery-such as being sick or getting admitted to any hospital within one week of your planned procedure.    Your contact details change, such as a change of address or phone number.    Starting now:     Please DO NOT drink alcohol or smoke for 24 hours before surgery. It is well known that quitting smoking can make a huge difference to your health and recovery from surgery. The longer you abstain from smoking, the better your chances of a healthy recovery. If you need help with quitting, call 1-965-QUIT-NOW to be connected to a trained counselor who will discuss the best methods to help you quit.     Before your surgery:    Please stop all supplements 7 days prior to surgery. Or as directed by your surgeon.   Please stop taking NSAID pain medicine such as Advil and Motrin 7 days before surgery.    If you  develop any fever, cough, cold, rashes, cuts, scratches, scrapes, urinary symptoms or infection anywhere on your body (including teeth and gums) prior to surgery, please call your surgeon’s office as soon as possible. This may require treatment to reduce the chance of cancellation on the day of surgery.    The day before your surgery:   DIET- Please follow the diet instructions at the top of your packet.   Get a good night’s rest.  Use the special soap for bathing if you have been instructed to use one.    Scheduled surgery times may change and you will be notified if this occurs - please check your personal voicemail for any updates.     On the morning of surgery:   Wear comfortable, loose fitting clothes which open in the front. Please do not wear moisturizers, creams, lotions, makeup or perfume.    Please bring with you to surgery:   Photo ID and insurance card   Current list of medicines and allergies   Pacemaker/ Defibrillator/Heart stent cards   CPAP machine and mask    Slings/ splints/ crutches   A copy of your complete advanced directive/DHPOA.    Please do NOT bring with you to surgery:   All jewelry and valuables should be left at home.   Prosthetic devices such as contact lenses, hearing aids, dentures, eyelash extensions, hairpins and body piercings must be removed prior to going in to the surgical suite.    After outpatient surgery:   A responsible adult MUST accompany you at the time of discharge and stay with you for 24 hours after your surgery. You may NOT drive yourself home after surgery.    Do not drive, operate machinery, make critical decisions or do activities that require co-ordination or balance until after a night’s sleep.   Do not drink alcoholic beverages for 24 hours.   Instructions for resuming your medications will be provided by your surgeon.    CALL YOUR DOCTOR AFTER SURGERY IF YOU HAVE:     Chills and/or a fever of 101° F or higher.    Redness, swelling, pus or drainage from your  surgical wound or a bad smell from the wound.    Lightheadedness, fainting or confusion.    Persistent vomiting (throwing up) and are not able to eat or drink for 12 hours.    Three or more loose, watery bowel movements in 24 hours (diarrhea).   Difficulty or pain while urinating( after non-urological surgery)    Pain and swelling in your legs, especially if it is only on one side.    Difficulty breathing or are breathing faster than normal.    Any new concerning symptoms.      Patient Information: Pre-Operative Infection Prevention Measures     Why did I have my nose, under my arms, and groin swabbed?  The purpose of the swab is to identify Staphylococcus aureus inside your nose or on your skin.  The swab was sent to the laboratory for culture.  A positive swab/culture for Staphylococcus aureus is called colonization or carriage.      What is Staphylococcus aureus?  Staphylococcus aureus, also known as “staph”, is a germ found on the skin or in the nose of healthy people.  Sometimes Staphylococcus aureus can get into the body and cause an infection.  This can be minor (such as pimples, boils, or other skin problems).  It might also be serious (such as a blood infection, pneumonia, or a surgical site infection).    What is Staphylococcus aureus colonization or carriage?  Colonization or carriage means that a person has the germ but is not sick from it.  These bacteria can be spread on the hands or when breathing or sneezing.    How is Staphylococcus aureus spread?  It is most often spread by close contact with a person or item that carries it.    What happens if my culture is positive for Staphylococcus aureus?  Your doctor/medical team will use this information to guide any antibiotic treatment which may be necessary.  Regardless of the culture results, we will clean the inside of your nose with a betadine swab just before you have your surgery.      Will I get an infection if I have Staphylococcus aureus in my  nose or on my skin?  Anyone can get an infection with Staphylococcus aureus.  However, the best way to reduce your risk of infection is to follow the instructions provided to you for the use of your CHG soap and dental rinse.        Patient Information: Oral/Dental Rinse    What is oral/dental rinse?   It is a mouthwash. It is a way of cleaning the mouth with a germ-killing solution before your surgery.  The solution contains chlorhexidine, commonly known as CHG.   It is used inside the mouth to kill a bacteria known as Staphylococcus aureus.  Let your doctor know if you are allergic to Chlorhexidine.    Why do I need to use CHG oral/dental rinse?  The CHG oral/dental rinse helps to kill a bacteria in your mouth known as Staphylococcus aureus.     This reduces the risk of infection at the surgical site.      Using your CHG oral/dental rinse  STEPS:  Use your CHG oral/dental rinse after you brush your teeth the night before (at bedtime) and the morning of your surgery.  Follow all directions on your prescription label.    Use the cap on the container to measure 15ml   Swish (gargle if you can) the mouthwash in your mouth for at least 30 seconds, (do not swallow) and spit out  After you use your CHG rinse, do not rinse your mouth with water, drink or eat.  Please refer to the prescription label for the appropriate time to resume oral intake      What side effects might I have using the CHG oral/dental rinse?  CHG rinse will stick to plaque on the teeth.  Brush and floss just before use.  Teeth brushing will help avoid staining of plaque during use.      Patient Information: Home Preoperative Antibacterial Shower      What is a home preoperative antibacterial shower?  This shower is a way of cleaning the skin with a germ-killing solution before surgery.  The solution contains chlorhexidine, commonly known as CHG.  CHG is a skin cleanser with germ-killing ability.  Let your doctor know if you are allergic to  chlorhexidine.    Why do I need to take a preoperative antibacterial shower?  Skin is not sterile.  It is best to try to make your skin as free of germs as possible before surgery.  Proper cleansing with a germ-killing soap before surgery can lower the number of germs on your skin.  This helps to reduce the risk of infection at the surgical site.  Following the instructions listed below will help you prepare your skin for surgery.      How do I use the solution?  Steps:  Begin using your CHG soap 5 days before your scheduled surgery on ___7/1/25 -- start wash 6/27/25___.    First, wash and rinse your hair using the CHG soap. Keep CHG soap away from ear canals and eyes.  Rinse completely, do not condition.  Hair extensions should be removed.  Wash your face with your normal soap and rinse.    Apply the CHG solution to a clean wet washcloth.  Turn the water off or move away from the water spray to avoid premature rinsing of the CHG soap as you are applying.   Firmly lather your entire body from the neck down.  Do not use on your face.  Pay special attention to the area(s) where your incision(s) will be located unless they are on your face.  Avoid scrubbing your skin too hard.  The important point is to have the CHG soap sit on your skin for 3 minutes.    When the 3 minutes are up, turn on the water and rinse the CHG solution off your body completely.   DO NOT wash with regular soap after you have used the CHG soap solution  Pat yourself dry with a clean, freshly-laundered towel.  DO NOT apply powders, deodorants, or lotions.  Dress in clean, freshly laundered nightclothes.    Be sure to sleep with clean, freshly laundered sheets.  Be aware that CHG will cause stains on fabrics; if you wash them with bleach after use.  Rinse your washcloth and other linens that have contact with CHG completely.  Use only non-chlorine detergents to launder the items used.   The morning of surgery is the fifth day.  Repeat the above  steps and dress in clean comfortable clothing     Whom should I contact if I have any questions regarding the use of CHG soap?  Call the [facility] at [phone] if you have any questions.

## 2025-06-17 NOTE — CPM/PAT H&P
CPM/PAT Evaluation       Name: Todd Small (Todd Small)  /Age: 1965/60 y.o.     In-Person       Chief Complaint: Back Pain    HPI: Todd Small is a 60 year old male with complaints of low back pain over the past 3 years. He is S/P right sacroiliac joint fusion.  He states the pain radiates to his left leg. He has tried injections in the past with no long term pain relief. He states he has pain with weight bearing and non weight bearing activities. He states he takes Tylenol and Oxycodone for pain at home. He is scheduled for a left sacroiliac joint fusion. He denies fever, vomiting, chest pain, sob, dizziness, and palpitations.    Medical History[1]    Social History     Tobacco Use    Smoking status: Former     Current packs/day: 0.00     Average packs/day: 1 pack/day for 15.0 years (15.0 ttl pk-yrs)     Types: Cigarettes     Start date: 2008     Quit date: 2023     Years since quittin.9    Smokeless tobacco: Never   Substance Use Topics    Alcohol use: Not Currently     Comment: sober x 3.5 years     Social History     Substance and Sexual Activity   Drug Use Never         Family History[2]    Allergies[3]  Current Outpatient Medications   Medication Sig Dispense Refill    acetaminophen (Tylenol) 325 mg tablet Take 1 tablet (325 mg) by mouth every 6 hours if needed for mild pain (1 - 3) or moderate pain (4 - 6).      amLODIPine (Norvasc) 5 mg tablet Take 1 tablet (5 mg) by mouth 2 times a day. 180 tablet 3    amoxicillin-clavulanate (Augmentin) 875-125 mg tablet Take 1 tablet (875 mg of amoxicillin) by mouth 2 times a day for 7 days. 14 tablet 0    carvedilol (Coreg) 12.5 mg tablet TAKE 1 TABLET BY MOUTH TWO TIMES A DAY WITH FOOD 180 tablet 3    cholecalciferol (Vitamin D3) 25 mcg (1000 units) tablet Take 1 tablet (1,000 Units) by mouth once daily.      furosemide (Lasix) 40 mg tablet TAKE ONE TABLET BY MOUTH ONCE A DAY 90 tablet 3    levothyroxine (Synthroid, Levoxyl) 150 mcg tablet  TAKE 1 TABLET BY MOUTH ON MONDAY THROUGH SATURDAY, AND TAKE 1 AND 1/2 TABLETS ON SUNDAY AS DIRECTED 96 tablet 0    nystatin (Mycostatin) 100,000 unit/gram powder Apply 1 Application topically 2 times a day. 60 g 1    oxyCODONE-acetaminophen (Percocet) 7.5-325 mg tablet Take 1 tablet by mouth 4 times a day as needed for severe pain (7 - 10). Do not fill before May 19, 2025. 120 tablet 0    potassium chloride CR (Klor-Con) 10 mEq ER tablet Take 1 tablet (10 mEq) by mouth 2 times a day. 180 tablet 3    sacubitriL-valsartan (Entresto)  mg tablet Take 1 tablet by mouth 2 times a day. 180 tablet 3    venlafaxine XR (Effexor XR) 37.5 mg 24 hr capsule Take 1 capsule (37.5 mg) by mouth once daily. Do not crush or chew. To be taken with 75 mg capsule for total of 112.5 mg daily 30 capsule 0    venlafaxine XR (Effexor-XR) 75 mg 24 hr capsule Take one capsule by mouth once a day (take with 150mg capsule for a total of 225mg daily) 90 capsule 0    [START ON 6/30/2025] ALPRAZolam (Xanax) 0.25 mg tablet Take 2 tablets (0.5 mg) by mouth 1 time if needed for anxiety for up to 1 dose. Do not fill before June 30, 2025. 2 tablet 0    naloxone (Narcan) 4 mg/0.1 mL nasal spray Administer 1 spray (4 mg) into affected nostril(s) if needed for opioid reversal. May repeat every 2-3 minutes if needed, alternating nostrils, until medical assistance becomes available. 2 each 0     No current facility-administered medications for this visit.          Review of Systems   Constitutional: Negative.    HENT:  Positive for rhinorrhea, sinus pressure and sinus pain.    Eyes: Negative.    Cardiovascular: Negative.    Gastrointestinal: Negative.    Endocrine: Negative.    Genitourinary: Negative.    Musculoskeletal:  Positive for back pain and gait problem.   Skin: Negative.    Allergic/Immunologic: Negative.    Hematological: Negative.    Psychiatric/Behavioral: Negative.             Physical Exam  Vitals reviewed.   Constitutional:        "Appearance: Normal appearance.   HENT:      Head: Normocephalic and atraumatic.      Nose: Nose normal.      Mouth/Throat:      Mouth: Mucous membranes are moist.      Pharynx: Oropharynx is clear.   Eyes:      Extraocular Movements: Extraocular movements intact.      Conjunctiva/sclera: Conjunctivae normal.   Cardiovascular:      Rate and Rhythm: Normal rate and regular rhythm.      Pulses: Normal pulses.      Heart sounds: Normal heart sounds.   Pulmonary:      Effort: Pulmonary effort is normal.      Breath sounds: Normal breath sounds.   Abdominal:      General: Bowel sounds are normal.      Palpations: Abdomen is soft.   Genitourinary:     Comments: Assessment deferred to physician  Musculoskeletal:      Cervical back: Normal range of motion and neck supple.      Comments: Limited ROM in Back   Skin:     General: Skin is warm and dry.   Neurological:      General: No focal deficit present.      Mental Status: He is alert and oriented to person, place, and time.   Psychiatric:         Mood and Affect: Mood normal.         Behavior: Behavior normal.         Thought Content: Thought content normal.         Judgment: Judgment normal.          PAT AIRWAY:   Airway:     Mallampati::  II    TM distance::  >3 FB    Neck ROM::  Full  normal            Visit Vitals  BP (!) 130/91   Pulse 98   Temp 36.1 °C (97 °F) (Temporal)   Resp 18   Ht 1.803 m (5' 11\")   Wt 109 kg (239 lb 3.2 oz)   SpO2 98%   BMI 33.36 kg/m²   Smoking Status Former   BSA 2.34 m²     ASA: III  CHADS2: 4.0%  RCRI: 0.9%  DASI: 9.95  METS: 4  STOP BAN          Assessment and Plan:     Sacroiliitis, not elsewhere classified : Sacroiliac Joint Fusion Left Side   CHF/ Nonischemic cardiomyopathy: Lasix, Entresto. Follows Cardiology Dr. Bush. Last visit 24. Per his note: 59-year-old male with history of alcohol abuse seen in the hospital in 2022 with acute coronavirus infection. He has significant lower extremity swelling. An " echocardiogram identified severe left ventricular systolic dysfunction with an ejection fraction of 20 to 25%, this is increased to 35% on the most recent study. He underwent coronary angiography to reveal normal coronary arteries. He was started on guideline directed medical therapy as well as an oral diuretic. The thought was that his dilated cardiomyopathy could be secondary to alcohol abuse.                 Hypertension: Amlodipine, Carvedilol  Hyperlipidemia  Hypothyroid: Levothyroxine  HX thyroid cancer: Thyroidectomy  Depression/ Anxiety: Effexor, Xanex  Recent sinus infection: Augmentin  BMI: 27.20    EKG 3/6/25  LABS: BMP, CBC, MRSA ordered in PAT  Cardiac Clearance Dr. Bush 3/11/25  Preop cardiovascular exam  The patient is of intermediate risk for a surgical procedure that carries an inherent moderate risk of adverse cardiac events. In the absence of acute coronary syndrome, acutely decompensated heart failure, hemodynamically significant valvular disease, or malignant arrhythmia I would consider this risk acceptable to proceed without further cardiac workup or intervention.         Hypertension  High normal. Will continue meds as is for now. Encouraged BP monitoring at home 2-3x/week. Lifestyle modifications were discussed. I advocated for mediterranean and plant based eating. We also discussed exercise. 150 minutes a week of moderate intensity exercise is recommended per our ACC/AHA guidelines        Chronic systolic heart failure (Multi)  Secondary to nonischemic cardiomyopathy.  He is on carvedilol, Entresto, intolerant to Farxiga.  I plan on putting him on spironolactone and reducing his amlodipine dose.  Will discontinue potassium supplementation at that time as well.  However, will keep things as is for now and plan on doing this after his operations.  Will have him follow-up with me in the office in 3 to 4 months.    SERVANDO Ortega-DENISE         [1]   Past Medical History:  Diagnosis Date     Anxiety 4/01/23    Back pain     Cancer (Multi) 10/01/0-    CHF (congestive heart failure) 10/01/23    Depression 10/01/23    Extremity pain     Headache     Hyperlipidemia     Hypertension     Hypothyroidism     Low back pain     Nonischemic cardiomyopathy (Multi)     Personal history of irradiation 1/10/95    Thyroid cancer (Multi)    [2]   Family History  Problem Relation Name Age of Onset    Heart disease Mother Kim     Heart disease Sister (1)     Other (pacemaker) Sister (1)     Heart disease Brother Skye     Atrial fibrillation Brother Gilberto    [3]   Allergies  Allergen Reactions    Fluconazole Confusion     PT takes alprazolam as well.  Last fill pt (Jan 25) stated he was disoriented, confused with additional side effects.   Cant not take both together    Latex Rash    Sulfa (Sulfonamide Antibiotics) Rash and Unknown

## 2025-06-18 LAB — STAPHYLOCOCCUS SPEC CULT: NORMAL

## 2025-06-19 ENCOUNTER — TELEPHONE (OUTPATIENT)
Dept: PRIMARY CARE | Facility: CLINIC | Age: 60
End: 2025-06-19

## 2025-06-19 ENCOUNTER — APPOINTMENT (OUTPATIENT)
Facility: CLINIC | Age: 60
End: 2025-06-19
Payer: COMMERCIAL

## 2025-06-19 ENCOUNTER — OFFICE VISIT (OUTPATIENT)
Facility: CLINIC | Age: 60
End: 2025-06-19
Payer: COMMERCIAL

## 2025-06-19 VITALS
BODY MASS INDEX: 33.46 KG/M2 | OXYGEN SATURATION: 99 % | SYSTOLIC BLOOD PRESSURE: 130 MMHG | DIASTOLIC BLOOD PRESSURE: 90 MMHG | HEIGHT: 71 IN | RESPIRATION RATE: 22 BRPM | HEART RATE: 69 BPM | WEIGHT: 239 LBS

## 2025-06-19 DIAGNOSIS — G89.29 OTHER CHRONIC PAIN: Primary | ICD-10-CM

## 2025-06-19 DIAGNOSIS — M46.1 SACROILIITIS, NOT ELSEWHERE CLASSIFIED: ICD-10-CM

## 2025-06-19 DIAGNOSIS — M54.16 LUMBAR RADICULOPATHY: ICD-10-CM

## 2025-06-19 PROCEDURE — 3075F SYST BP GE 130 - 139MM HG: CPT | Performed by: NURSE PRACTITIONER

## 2025-06-19 PROCEDURE — 1036F TOBACCO NON-USER: CPT | Performed by: NURSE PRACTITIONER

## 2025-06-19 PROCEDURE — 99214 OFFICE O/P EST MOD 30 MIN: CPT | Performed by: NURSE PRACTITIONER

## 2025-06-19 PROCEDURE — 3008F BODY MASS INDEX DOCD: CPT | Performed by: NURSE PRACTITIONER

## 2025-06-19 PROCEDURE — 3080F DIAST BP >= 90 MM HG: CPT | Performed by: NURSE PRACTITIONER

## 2025-06-19 RX ORDER — OXYCODONE AND ACETAMINOPHEN 7.5; 325 MG/1; MG/1
1 TABLET ORAL 4 TIMES DAILY PRN
Qty: 120 TABLET | Refills: 0 | Status: SHIPPED | OUTPATIENT
Start: 2025-06-19 | End: 2025-07-19

## 2025-06-19 ASSESSMENT — ENCOUNTER SYMPTOMS
DIARRHEA: 0
HEMATOLOGIC/LYMPHATIC NEGATIVE: 1
SLEEP DISTURBANCE: 0
AGITATION: 0
CONFUSION: 0
CONSTITUTIONAL NEGATIVE: 1
ABDOMINAL DISTENTION: 0
ABDOMINAL PAIN: 0
DIFFICULTY URINATING: 0
VOMITING: 0
PALPITATIONS: 0
SHORTNESS OF BREATH: 0
DYSURIA: 0
NAUSEA: 0
BACK PAIN: 1
NEUROLOGICAL NEGATIVE: 1
COUGH: 0

## 2025-06-19 ASSESSMENT — PAIN SCALES - GENERAL
PAINLEVEL_OUTOF10: 7
PAINLEVEL_OUTOF10: 7

## 2025-06-19 ASSESSMENT — PAIN DESCRIPTION - DESCRIPTORS: DESCRIPTORS: ACHING

## 2025-06-19 ASSESSMENT — PATIENT HEALTH QUESTIONNAIRE - PHQ9
2. FEELING DOWN, DEPRESSED OR HOPELESS: NOT AT ALL
SUM OF ALL RESPONSES TO PHQ9 QUESTIONS 1 & 2: 0
1. LITTLE INTEREST OR PLEASURE IN DOING THINGS: NOT AT ALL

## 2025-06-19 ASSESSMENT — PAIN - FUNCTIONAL ASSESSMENT: PAIN_FUNCTIONAL_ASSESSMENT: 0-10

## 2025-06-19 NOTE — PROGRESS NOTES
Subjective   Patient ID: Todd Small is a 60 y.o. male who presents for Back Pain.  Back Pain  Pertinent negatives include no abdominal pain, chest pain or dysuria.     He is here for a follow up and refill for his lumbar and SI joint pain. He states his left leg is very bothersome. He is hopeful the fusion will help his leg. His right leg is better from the last fusion. His medications are helpful and allow him to stay active.     Review of Systems   Constitutional: Negative.    HENT: Negative.     Respiratory:  Negative for cough and shortness of breath.    Cardiovascular:  Negative for chest pain, palpitations and leg swelling.   Gastrointestinal:  Negative for abdominal distention, abdominal pain, diarrhea, nausea and vomiting.   Endocrine: Negative for cold intolerance and heat intolerance.   Genitourinary:  Negative for difficulty urinating, dysuria and urgency.   Musculoskeletal:  Positive for back pain.   Skin: Negative.    Neurological: Negative.    Hematological: Negative.    Psychiatric/Behavioral:  Negative for agitation, confusion, sleep disturbance and suicidal ideas.        Objective   Physical Exam  Constitutional:       Appearance: Normal appearance.   Pulmonary:      Comments: End expiratory wheeze BL  Musculoskeletal:      Lumbar back: Tenderness present. Decreased range of motion.   Neurological:      Mental Status: He is alert and oriented to person, place, and time.      Sensory: Sensory deficit present.      Motor: Weakness present.      Gait: Gait abnormal.   Psychiatric:         Mood and Affect: Mood normal.         Behavior: Behavior normal.         Assessment/Plan   Problem List Items Addressed This Visit           ICD-10-CM       Musculoskeletal and Injuries    Sacroiliitis, not elsewhere classified M46.1       Neuro    Chronic pain - Primary G89.29     Other Visit Diagnoses         Codes      Lumbar radiculopathy     M54.16    Relevant Medications    oxyCODONE-acetaminophen (Percocet)  7.5-325 mg tablet             I nice discussion with the patient today our plan will be as follows.    Radiology: no new imaging needed at this time.     Physically:  Would benefit from PT after fusion.     Psychologically:  no concern.     Medication: I will refill the patient's opioids today for 1 month.  He is no longer taking the xanax.     The patient continues to see benefit and improvement in their quality of life and ability to maintain ADLs.  Patient educated about the risks of taking opioids and operating a motor vehicle.  Patient reports no adverse side effects to current medication regimen.  Current regimen does allow patient to maintain ADLs.  Patient reports no new neurologic symptoms, new pain areas, or exacerbation in pain today.  Patient reports they are happy with current treatment care path.    [place] was reviewed and was consistent with the history.    Patient has been educated on the risks, benefits, and alternatives of controlled substances as well as the proper way to store these medications.  The patient and I discussed the nature of this medication and its side effects.  We discussed tolerance, physical dependence, psychological dependence, addiction and opioid-induced hyperalgesia.  We discussed the potential need to wean from this medication.  We discussed the availability of programs that can help with this process if necessary.  We discussed safety issues related to opioids including safe storage.  We discussed the fact that the patient should not drive an automobile or operate heavy machinery while taking this medication.  A prescription for naloxone was offered to the patient.  The patient will be re-evaluated for the need to continue opioid therapy in 60-90 days.      Duration:  chronic on going.     Intervention:  awaiting left SI joint fusion.  He was told to call his PCP and get in to have his lungs assessed. He had some end expiratory wheezing today.       Marlee Pardo, APRN-CNP  06/19/25 8:29 AM

## 2025-06-19 NOTE — TELEPHONE ENCOUNTER
Patient called and stated he has seen 3 doctors in the last week and all have told him to see his primary care physician due to a sinus infection.  He has not yet established care with you but is asking if there is a way to see you prior to August when his appointment is scheduled.  He was seen at urgent care 06/12/25.

## 2025-06-20 ENCOUNTER — HOSPITAL ENCOUNTER (OUTPATIENT)
Dept: RADIOLOGY | Facility: CLINIC | Age: 60
Discharge: HOME | End: 2025-06-20
Payer: COMMERCIAL

## 2025-06-20 ENCOUNTER — OFFICE VISIT (OUTPATIENT)
Dept: PRIMARY CARE | Facility: CLINIC | Age: 60
End: 2025-06-20
Payer: COMMERCIAL

## 2025-06-20 VITALS
HEIGHT: 71 IN | SYSTOLIC BLOOD PRESSURE: 122 MMHG | DIASTOLIC BLOOD PRESSURE: 72 MMHG | HEART RATE: 88 BPM | BODY MASS INDEX: 33.32 KG/M2 | OXYGEN SATURATION: 98 % | WEIGHT: 238 LBS

## 2025-06-20 DIAGNOSIS — E03.9 ACQUIRED HYPOTHYROIDISM: ICD-10-CM

## 2025-06-20 DIAGNOSIS — Z76.89 ENCOUNTER TO ESTABLISH CARE: ICD-10-CM

## 2025-06-20 DIAGNOSIS — J06.9 ACUTE URI: Primary | ICD-10-CM

## 2025-06-20 DIAGNOSIS — J06.9 ACUTE URI: ICD-10-CM

## 2025-06-20 PROCEDURE — 71046 X-RAY EXAM CHEST 2 VIEWS: CPT

## 2025-06-20 PROCEDURE — 99214 OFFICE O/P EST MOD 30 MIN: CPT | Performed by: FAMILY MEDICINE

## 2025-06-20 PROCEDURE — 71046 X-RAY EXAM CHEST 2 VIEWS: CPT | Performed by: RADIOLOGY

## 2025-06-20 PROCEDURE — 3008F BODY MASS INDEX DOCD: CPT | Performed by: FAMILY MEDICINE

## 2025-06-20 PROCEDURE — 3074F SYST BP LT 130 MM HG: CPT | Performed by: FAMILY MEDICINE

## 2025-06-20 PROCEDURE — 3078F DIAST BP <80 MM HG: CPT | Performed by: FAMILY MEDICINE

## 2025-06-20 RX ORDER — ALBUTEROL SULFATE 90 UG/1
2 INHALANT RESPIRATORY (INHALATION) EVERY 6 HOURS PRN
Qty: 18 G | Refills: 0 | Status: SHIPPED | OUTPATIENT
Start: 2025-06-20 | End: 2025-07-20

## 2025-06-20 RX ORDER — DOXYCYCLINE 100 MG/1
100 CAPSULE ORAL 2 TIMES DAILY
Qty: 20 CAPSULE | Refills: 0 | Status: SHIPPED | OUTPATIENT
Start: 2025-06-20 | End: 2025-06-30

## 2025-06-20 RX ORDER — LEVOTHYROXINE SODIUM 150 UG/1
TABLET ORAL
Qty: 92 TABLET | Refills: 3 | Status: SHIPPED | OUTPATIENT
Start: 2025-06-20 | End: 2025-09-18

## 2025-06-20 RX ORDER — METHYLPREDNISOLONE 4 MG/1
TABLET ORAL
Qty: 21 TABLET | Refills: 0 | Status: SHIPPED | OUTPATIENT
Start: 2025-06-20

## 2025-06-20 ASSESSMENT — PAIN SCALES - GENERAL: PAINLEVEL_OUTOF10: 9

## 2025-06-20 NOTE — PROGRESS NOTES
60-year presents to clinic to establish care and for follow-up chronic medical conditions    1. Acute URI   Patient recently evaluated by an alternative provider secondary to 10-day history of sinus pain pressure congestion purulent nasal discharge as well as productive cough.  No fevers or chills.  Was placed on a 7-day course of Augmentin and symptoms are vastly improving at this point.  Still experiencing productive cough and some purulent sputum.   2. Encounter to establish care    3. Acquired hypothyroidism   Patient with a history of hypothyroidism currently on levothyroxine 150 mcg 6 times weekly and 225 mcg once weekly most recent TSH at goal     All pertinent positive symptoms are included in history of present illness.    All other systems have been reviewed and are negative and noncontributory to this patient's current ailments.    CONSTITUTIONAL - INAD. Not ill appearing.  SKIN - No lesions or rashes visualized. Good skin turgor.  HEENT- Head is atraumatic and normocephalic. Nasal turbinates are nonerythematous and without drainage. .  RESP -end expiratory wheezing in the bilateral lung fields upper and lower.  Rhonchi, or crackles.   CARDIAC - RRR. No murmurs, gallops, or rubs.  ABDOMEN - Soft, nontender, nondistended. No organomegaly.  NEURO - CNs 2-12 grossly intact.  PSYCH - Normal mood and affect    1. Encounter to establish care    - Referral to Primary Care    2. Acquired hypothyroidism  TSH at goal continue levothyroxine  - levothyroxine (Synthroid, Levoxyl) 150 mcg tablet; Take 1 tablet (150 mcg) by mouth 6 times a week AND 1.5 tablets (225 mcg) 1 (one) time per week. Take on an empty stomach at the same time each day, either 30 to 60 minutes prior to breakfast.  Dispense: 92 tablet; Refill: 3    3. Acute URI (Primary)  Patient began 10-day history treated with Augmentin but still symptomatic consider possible treatment failure versus inadequate treatment for CAP secondary to persistent symptoms  and an expiratory wheeze will get chest x-ray to evaluate for underlying CAP  If symptoms continue improving will treat with albuterol and steroids, if x-ray indicative of possible CAP versus bronchitis will consider treatment failure and treat with doxycycline, if leaning towards CAP will perform dual therapy with doxycycline and macrolide  - XR chest 2 views; Future  - albuterol 90 mcg/actuation inhaler; Inhale 2 puffs every 6 hours if needed for wheezing.  Dispense: 18 g; Refill: 0  - methylPREDNISolone (Medrol Dospak) 4 mg tablets; Take as directed on package.  Dispense: 21 tablet; Refill: 0  - doxycycline (Vibramycin) 100 mg capsule; Take 1 capsule (100 mg) by mouth 2 times a day for 10 days. Take with at least 8 ounces (large glass) of water, do not lie down for 30 minutes after  Dispense: 20 capsule; Refill: 0

## 2025-06-21 NOTE — RESULT ENCOUNTER NOTE
No signs of consolidation or pneumonia.  Consider adequately treated, if symptoms persist can take doxycycline as prescribed.

## 2025-07-01 ENCOUNTER — PHARMACY VISIT (OUTPATIENT)
Dept: PHARMACY | Facility: CLINIC | Age: 60
End: 2025-07-01

## 2025-07-01 ENCOUNTER — PATIENT MESSAGE (OUTPATIENT)
Dept: PAIN MEDICINE | Facility: CLINIC | Age: 60
End: 2025-07-01

## 2025-07-01 ENCOUNTER — ANESTHESIA (OUTPATIENT)
Dept: OPERATING ROOM | Facility: HOSPITAL | Age: 60
End: 2025-07-01
Payer: COMMERCIAL

## 2025-07-01 ENCOUNTER — APPOINTMENT (OUTPATIENT)
Dept: RADIOLOGY | Facility: HOSPITAL | Age: 60
End: 2025-07-01
Payer: COMMERCIAL

## 2025-07-01 ENCOUNTER — ANESTHESIA EVENT (OUTPATIENT)
Dept: OPERATING ROOM | Facility: HOSPITAL | Age: 60
End: 2025-07-01
Payer: COMMERCIAL

## 2025-07-01 ENCOUNTER — HOSPITAL ENCOUNTER (OUTPATIENT)
Facility: HOSPITAL | Age: 60
Setting detail: OUTPATIENT SURGERY
Discharge: HOME | End: 2025-07-01
Attending: ANESTHESIOLOGY | Admitting: ANESTHESIOLOGY
Payer: COMMERCIAL

## 2025-07-01 VITALS
HEART RATE: 65 BPM | RESPIRATION RATE: 15 BRPM | TEMPERATURE: 97.7 F | BODY MASS INDEX: 33.32 KG/M2 | WEIGHT: 238 LBS | DIASTOLIC BLOOD PRESSURE: 71 MMHG | SYSTOLIC BLOOD PRESSURE: 110 MMHG | HEIGHT: 71 IN | OXYGEN SATURATION: 96 %

## 2025-07-01 DIAGNOSIS — G89.18 POSTOPERATIVE PAIN: ICD-10-CM

## 2025-07-01 DIAGNOSIS — M46.1 SACROILIITIS, NOT ELSEWHERE CLASSIFIED: Primary | ICD-10-CM

## 2025-07-01 PROBLEM — E66.9 OBESITY: Status: ACTIVE | Noted: 2025-07-01

## 2025-07-01 PROBLEM — I50.9 CHF (CONGESTIVE HEART FAILURE): Status: ACTIVE | Noted: 2025-07-01

## 2025-07-01 PROCEDURE — A27279 PR ARTHRODESIS SACROILIAC JOINT PERCUTANEOUS: Performed by: ANESTHESIOLOGY

## 2025-07-01 PROCEDURE — 7100000010 HC PHASE TWO TIME - EACH INCREMENTAL 1 MINUTE: Performed by: ANESTHESIOLOGY

## 2025-07-01 PROCEDURE — 7100000001 HC RECOVERY ROOM TIME - INITIAL BASE CHARGE: Performed by: ANESTHESIOLOGY

## 2025-07-01 PROCEDURE — 7100000009 HC PHASE TWO TIME - INITIAL BASE CHARGE: Performed by: ANESTHESIOLOGY

## 2025-07-01 PROCEDURE — 2500000004 HC RX 250 GENERAL PHARMACY W/ HCPCS (ALT 636 FOR OP/ED): Mod: JZ | Performed by: ANESTHESIOLOGY

## 2025-07-01 PROCEDURE — 3600000010 HC OR TIME - EACH INCREMENTAL 1 MINUTE - PROCEDURE LEVEL FIVE: Performed by: ANESTHESIOLOGY

## 2025-07-01 PROCEDURE — 3700000001 HC GENERAL ANESTHESIA TIME - INITIAL BASE CHARGE: Performed by: ANESTHESIOLOGY

## 2025-07-01 PROCEDURE — 2780000003 HC OR 278 NO HCPCS: Performed by: ANESTHESIOLOGY

## 2025-07-01 PROCEDURE — A27279 PR ARTHRODESIS SACROILIAC JOINT PERCUTANEOUS: Performed by: NURSE ANESTHETIST, CERTIFIED REGISTERED

## 2025-07-01 PROCEDURE — 2500000004 HC RX 250 GENERAL PHARMACY W/ HCPCS (ALT 636 FOR OP/ED): Performed by: ANESTHESIOLOGY

## 2025-07-01 PROCEDURE — 3600000005 HC OR TIME - INITIAL BASE CHARGE - PROCEDURE LEVEL FIVE: Performed by: ANESTHESIOLOGY

## 2025-07-01 PROCEDURE — 27279 ARTHRD SI JT PLMT TARTCLR DV: CPT | Performed by: ANESTHESIOLOGY

## 2025-07-01 PROCEDURE — RXMED WILLOW AMBULATORY MEDICATION CHARGE

## 2025-07-01 PROCEDURE — 3700000002 HC GENERAL ANESTHESIA TIME - EACH INCREMENTAL 1 MINUTE: Performed by: ANESTHESIOLOGY

## 2025-07-01 PROCEDURE — 2720000007 HC OR 272 NO HCPCS: Performed by: ANESTHESIOLOGY

## 2025-07-01 PROCEDURE — 2500000005 HC RX 250 GENERAL PHARMACY W/O HCPCS: Performed by: ANESTHESIOLOGY

## 2025-07-01 PROCEDURE — 2500000004 HC RX 250 GENERAL PHARMACY W/ HCPCS (ALT 636 FOR OP/ED): Performed by: NURSE ANESTHETIST, CERTIFIED REGISTERED

## 2025-07-01 PROCEDURE — 7100000002 HC RECOVERY ROOM TIME - EACH INCREMENTAL 1 MINUTE: Performed by: ANESTHESIOLOGY

## 2025-07-01 DEVICE — IMPLANTABLE DEVICE: Type: IMPLANTABLE DEVICE | Site: BACK | Status: FUNCTIONAL

## 2025-07-01 RX ORDER — IPRATROPIUM BROMIDE 0.5 MG/2.5ML
500 SOLUTION RESPIRATORY (INHALATION) ONCE AS NEEDED
Status: DISCONTINUED | OUTPATIENT
Start: 2025-07-01 | End: 2025-07-01 | Stop reason: HOSPADM

## 2025-07-01 RX ORDER — FENTANYL CITRATE 50 UG/ML
INJECTION, SOLUTION INTRAMUSCULAR; INTRAVENOUS AS NEEDED
Status: DISCONTINUED | OUTPATIENT
Start: 2025-07-01 | End: 2025-07-01

## 2025-07-01 RX ORDER — DEXMEDETOMIDINE IN 0.9 % NACL 20 MCG/5ML
SYRINGE (ML) INTRAVENOUS AS NEEDED
Status: DISCONTINUED | OUTPATIENT
Start: 2025-07-01 | End: 2025-07-01

## 2025-07-01 RX ORDER — HYDRALAZINE HYDROCHLORIDE 20 MG/ML
5 INJECTION INTRAMUSCULAR; INTRAVENOUS EVERY 30 MIN PRN
Status: DISCONTINUED | OUTPATIENT
Start: 2025-07-01 | End: 2025-07-01 | Stop reason: HOSPADM

## 2025-07-01 RX ORDER — SODIUM CHLORIDE, SODIUM LACTATE, POTASSIUM CHLORIDE, CALCIUM CHLORIDE 600; 310; 30; 20 MG/100ML; MG/100ML; MG/100ML; MG/100ML
100 INJECTION, SOLUTION INTRAVENOUS CONTINUOUS
Status: DISCONTINUED | OUTPATIENT
Start: 2025-07-01 | End: 2025-07-01 | Stop reason: HOSPADM

## 2025-07-01 RX ORDER — HYDROMORPHONE HYDROCHLORIDE 0.2 MG/ML
0.2 INJECTION INTRAMUSCULAR; INTRAVENOUS; SUBCUTANEOUS EVERY 5 MIN PRN
Status: DISCONTINUED | OUTPATIENT
Start: 2025-07-01 | End: 2025-07-01 | Stop reason: HOSPADM

## 2025-07-01 RX ORDER — SODIUM CHLORIDE 0.9 G/100ML
INJECTION, SOLUTION IRRIGATION AS NEEDED
Status: DISCONTINUED | OUTPATIENT
Start: 2025-07-01 | End: 2025-07-01 | Stop reason: HOSPADM

## 2025-07-01 RX ORDER — OXYCODONE AND ACETAMINOPHEN 7.5; 325 MG/1; MG/1
1 TABLET ORAL EVERY 4 HOURS PRN
Qty: 14 TABLET | Refills: 0 | Status: SHIPPED | OUTPATIENT
Start: 2025-07-01 | End: 2025-07-08

## 2025-07-01 RX ORDER — BUPIVACAINE HCL/EPINEPHRINE 0.25-.0005
VIAL (ML) INJECTION AS NEEDED
Status: DISCONTINUED | OUTPATIENT
Start: 2025-07-01 | End: 2025-07-01 | Stop reason: HOSPADM

## 2025-07-01 RX ORDER — CEFAZOLIN SODIUM 2 G/100ML
2 INJECTION, SOLUTION INTRAVENOUS ONCE
Status: COMPLETED | OUTPATIENT
Start: 2025-07-01 | End: 2025-07-01

## 2025-07-01 RX ORDER — MEPERIDINE HYDROCHLORIDE 25 MG/ML
12.5 INJECTION INTRAMUSCULAR; INTRAVENOUS; SUBCUTANEOUS EVERY 10 MIN PRN
Status: DISCONTINUED | OUTPATIENT
Start: 2025-07-01 | End: 2025-07-01 | Stop reason: HOSPADM

## 2025-07-01 RX ORDER — LABETALOL HYDROCHLORIDE 5 MG/ML
5 INJECTION, SOLUTION INTRAVENOUS
Status: DISCONTINUED | OUTPATIENT
Start: 2025-07-01 | End: 2025-07-01 | Stop reason: HOSPADM

## 2025-07-01 RX ORDER — CETIRIZINE HYDROCHLORIDE 10 MG/1
10 TABLET, CHEWABLE ORAL DAILY
COMMUNITY

## 2025-07-01 RX ORDER — ONDANSETRON HYDROCHLORIDE 2 MG/ML
INJECTION, SOLUTION INTRAVENOUS AS NEEDED
Status: DISCONTINUED | OUTPATIENT
Start: 2025-07-01 | End: 2025-07-01

## 2025-07-01 RX ORDER — PROPOFOL 10 MG/ML
INJECTION, EMULSION INTRAVENOUS AS NEEDED
Status: DISCONTINUED | OUTPATIENT
Start: 2025-07-01 | End: 2025-07-01

## 2025-07-01 RX ORDER — MIDAZOLAM HYDROCHLORIDE 1 MG/ML
INJECTION, SOLUTION INTRAMUSCULAR; INTRAVENOUS AS NEEDED
Status: DISCONTINUED | OUTPATIENT
Start: 2025-07-01 | End: 2025-07-01

## 2025-07-01 RX ORDER — ALBUTEROL SULFATE 0.83 MG/ML
2.5 SOLUTION RESPIRATORY (INHALATION) ONCE AS NEEDED
Status: DISCONTINUED | OUTPATIENT
Start: 2025-07-01 | End: 2025-07-01 | Stop reason: HOSPADM

## 2025-07-01 RX ORDER — LIDOCAINE HYDROCHLORIDE 20 MG/ML
INJECTION, SOLUTION EPIDURAL; INFILTRATION; INTRACAUDAL; PERINEURAL AS NEEDED
Status: DISCONTINUED | OUTPATIENT
Start: 2025-07-01 | End: 2025-07-01

## 2025-07-01 RX ORDER — LIDOCAINE HYDROCHLORIDE AND EPINEPHRINE 10; 10 UG/ML; MG/ML
INJECTION, SOLUTION INFILTRATION; PERINEURAL AS NEEDED
Status: DISCONTINUED | OUTPATIENT
Start: 2025-07-01 | End: 2025-07-01 | Stop reason: HOSPADM

## 2025-07-01 RX ORDER — OXYCODONE HYDROCHLORIDE 5 MG/1
5 TABLET ORAL EVERY 4 HOURS PRN
Status: DISCONTINUED | OUTPATIENT
Start: 2025-07-01 | End: 2025-07-01 | Stop reason: HOSPADM

## 2025-07-01 RX ADMIN — Medication 8 MCG: at 10:07

## 2025-07-01 RX ADMIN — SODIUM CHLORIDE, POTASSIUM CHLORIDE, SODIUM LACTATE AND CALCIUM CHLORIDE: 600; 310; 30; 20 INJECTION, SOLUTION INTRAVENOUS at 09:56

## 2025-07-01 RX ADMIN — PROPOFOL 100 MCG/KG/MIN: 10 INJECTION, EMULSION INTRAVENOUS at 10:04

## 2025-07-01 RX ADMIN — HYDROMORPHONE HYDROCHLORIDE 0.5 MG: 1 INJECTION, SOLUTION INTRAMUSCULAR; INTRAVENOUS; SUBCUTANEOUS at 11:40

## 2025-07-01 RX ADMIN — LIDOCAINE HYDROCHLORIDE 50 MG: 20 INJECTION, SOLUTION EPIDURAL; INFILTRATION; INTRACAUDAL; PERINEURAL at 10:04

## 2025-07-01 RX ADMIN — FENTANYL CITRATE 25 MCG: 0.05 INJECTION, SOLUTION INTRAMUSCULAR; INTRAVENOUS at 10:14

## 2025-07-01 RX ADMIN — HYDROMORPHONE HYDROCHLORIDE 0.5 MG: 1 INJECTION, SOLUTION INTRAMUSCULAR; INTRAVENOUS; SUBCUTANEOUS at 11:48

## 2025-07-01 RX ADMIN — ONDANSETRON 4 MG: 2 INJECTION INTRAMUSCULAR; INTRAVENOUS at 10:06

## 2025-07-01 RX ADMIN — HYDROMORPHONE HYDROCHLORIDE 0.5 MG: 1 INJECTION, SOLUTION INTRAMUSCULAR; INTRAVENOUS; SUBCUTANEOUS at 12:02

## 2025-07-01 RX ADMIN — MIDAZOLAM 2 MG: 1 INJECTION INTRAMUSCULAR; INTRAVENOUS at 09:56

## 2025-07-01 RX ADMIN — HYDROMORPHONE HYDROCHLORIDE 0.5 MG: 1 INJECTION, SOLUTION INTRAMUSCULAR; INTRAVENOUS; SUBCUTANEOUS at 11:33

## 2025-07-01 RX ADMIN — PROPOFOL 60 MG: 10 INJECTION, EMULSION INTRAVENOUS at 10:03

## 2025-07-01 RX ADMIN — CEFAZOLIN SODIUM 2 G: 2 INJECTION, SOLUTION INTRAVENOUS at 10:04

## 2025-07-01 SDOH — HEALTH STABILITY: MENTAL HEALTH: CURRENT SMOKER: 0

## 2025-07-01 ASSESSMENT — COLUMBIA-SUICIDE SEVERITY RATING SCALE - C-SSRS
6. HAVE YOU EVER DONE ANYTHING, STARTED TO DO ANYTHING, OR PREPARED TO DO ANYTHING TO END YOUR LIFE?: NO
1. IN THE PAST MONTH, HAVE YOU WISHED YOU WERE DEAD OR WISHED YOU COULD GO TO SLEEP AND NOT WAKE UP?: NO
2. HAVE YOU ACTUALLY HAD ANY THOUGHTS OF KILLING YOURSELF?: NO

## 2025-07-01 ASSESSMENT — PAIN - FUNCTIONAL ASSESSMENT
PAIN_FUNCTIONAL_ASSESSMENT: 0-10

## 2025-07-01 ASSESSMENT — PAIN DESCRIPTION - DESCRIPTORS
DESCRIPTORS: ACHING
DESCRIPTORS: SHOOTING
DESCRIPTORS: THROBBING;ACHING
DESCRIPTORS: ACHING

## 2025-07-01 ASSESSMENT — PAIN SCALES - GENERAL
PAIN_LEVEL: 5
PAINLEVEL_OUTOF10: 5 - MODERATE PAIN
PAINLEVEL_OUTOF10: 7
PAINLEVEL_OUTOF10: 5 - MODERATE PAIN
PAINLEVEL_OUTOF10: 8
PAINLEVEL_OUTOF10: 5 - MODERATE PAIN
PAINLEVEL_OUTOF10: 8
PAINLEVEL_OUTOF10: 7

## 2025-07-01 ASSESSMENT — PAIN DESCRIPTION - LOCATION
LOCATION: BACK

## 2025-07-01 ASSESSMENT — PAIN DESCRIPTION - ORIENTATION
ORIENTATION: LOWER

## 2025-07-01 NOTE — INTERVAL H&P NOTE
H&P reviewed. The patient was examined and there are no changes to the H&P. Todd Small is a 60 y.o. male with PMH CHF 2/2 former EtOH use, former smoker, CKD, MDD, papillary thyroid ca s/p thyroidectomy/RT with hypothyroidism who presents  for percutaneous posterolateral left sacroiliac joint fusion. Patient's pain stable and persistent from last visit.  Appropriately NPO. No personal/family hx issues with anesthesia. Denies allergies to steroids, local anesthetics, or iodine/contrast. Denies being on blood thinners. Not diabetic.  Denies fever, chills, NS, CP, SOB, cough, N/V.    Discussed procedure risks/benefits in detail with patient. Pt meets medical necessity for procedure due to failure of conservative measures. Reviewed procedural risks including bleeding, infection, nerve damage, paralysis. Also reviewed mitigating factors such as screening for infection/blood thinner use, sterile precautions, and image-guidance when applicable. All questions answered. Pt/guardian expressed understanding and choose to proceed      Nathaly Cantrell MD  Anesthesiologist & Interventional Pain Physician   [facility] [place]  O: [phone]  F: [phone]  9:37 AM  07/01/25

## 2025-07-01 NOTE — DISCHARGE INSTRUCTIONS
DISCHARGE INSTRUCTIONS FOR SACROILIAC JOINT FUSION     You underwent a left posterolateral percutaneous sacroiliac joint fusion today    It is recommended that you relax and limit your activity for the remainder of the day unless you have been told otherwise by your pain physician.  You should not drive a car, operate machinery, or make important legal decisions unless otherwise directed by your pain physician.  Please avoid excessive bending, twisting, or lifting >25 lb for the next 6 weeks. During this time, please take care to avoid anti-inflammatory medications such as ibuprofen, naproxen, or steroids in order to promote fusion.     For all injections, please keep the injection site dry. You may shower in 2 days; no soaking baths, hot tubs, whirlpools or swimming pools for two weeks to prevent infection.      Some discomfort, bruising or slight swelling may occur at the injection site. This is not abnormal if it occurs.  If needed you may:    -Take over the counter medication such as Tylenol.   -Apply an ice pack for 30 minutes, 2 to 3 times a day for the first 24 hours.     If you take a blood thinning medication (Coumadin, Lovenox, Fragmin,Ticlid, Plavix, Pradaxa, etc.), please discuss this with your primary care physician/cardiologist and your pain physician. These medications MUST be discontinued before you can have an procedure safely, without the risk of uncontrolled bleeding. If these medications are not discontinued for an appropriate period of time, you will not be able to receivean injection.      If you are taking Coumadin, please have your INR checked the morning of your procedure and bring the result to your appointment unless otherwise instructed. If your INR is over 1.2, your injection may need to be rescheduled to avoid uncontrolled bleeding from the needle placement.     Call [facility] at [phone] between 8am-4pm Monday - Friday if you are experiencing the following:     If you received an epidural or spinal injection:    -Headache that doesnot go away with medicine, is worse when sitting or standing up, and is greatly relieved upon lying down.   -Severe pain worse than or different than your baseline pain.   -Chills or fever (101º F or greater).   -Drainage or signs of infection at the injection site     Go directly to the Emergency Department if you are experiencing the following and received an epidural or spinal injection:   -Abrupt weakness or progressive weakness in your legs that starts after you leave the clinic.   -Abrupt severe or worsening numbness in your legs.   -Inability to urinate after the injection or loss of bowel or bladder control without the urge to defecate or urinate.     If you have a clinical question that cannot wait until your next appointment, please call [phone] between 8am-4pm Monday - Friday or send a [facility] message. We do our best to return all non-emergency messages within 24 hours, Monday - Friday. A nurse or physician will return your message.      If you need to cancel an appointment, please call the scheduling staff at [phone] during normal business hours or leave a message at least 24 hours in advance.     If you are going to be sedated for your next procedure, you MUST have responsible adult who can legally drive accompany you home. You cannot eat or drink for eight hours prior to the planned procedure if you are going to receive sedation. You may take your non-blood thinning medications with a small sip of water.

## 2025-07-01 NOTE — OP NOTE
Sacroiliac Joint Fusion Left Side Operative Note     Date: 2025  OR Location: [facility]    Name: Todd Small, : 1965, Age: 60 y.o., MRN: [id number], Sex: male    Diagnosis  Pre-op Diagnosis      * Sacroiliitis, not elsewhere classified [M46.1] Post-op Diagnosis     * Sacroiliitis, not elsewhere classified [M46.1]     Procedures  Sacroiliac Joint Fusion Left Side  63915 - [place] ARTHRODESIS SI JOINT PERCUTANEOUS/MIN INVASIVE      Surgeons      * Nathaly Cantrell - Primary    Resident/Fellow/Other Assistant:  Surgeons and Role:  * No surgeons found with a matching role *    Staff:   Circulator: Nguyễn  Surgical Assistant: Jessica Elkins Person: Rom    Anesthesia Staff: Anesthesiologist: Clarice Aguilar MD MPH  CRNA: SERVANDO Read-DEBORAH    Procedure Summary  Anesthesia: Monitor Anesthesia Care  ASA: III  Estimated Blood Loss: 10 mL  Intra-op Medications: Administrations occurring from 25 1226 to 25 1226:  * No intraprocedure medications in log *           Anesthesia Record               Intraprocedure I/O Totals          Intake    LR bolus 500.00 mL    Total Intake 500 mL          Specimen: No specimens collected              Drains and/or Catheters: * None in log *    Tourniquet Times:         Implants:  Implants       Type Name Action Serial No.       TRANSLOC 3D SCREW 8.5 X 45MM Implanted       TRANSLOC 3D SCREW, 8.5 X 40 Implanted              Indications: Todd Small is an 60 y.o. male who is having surgery for Sacroiliitis, not elsewhere classified [M46.1].       Sacroiliac joint fusion, left     Procedure:  percutaneous posterolateral sacroiliac joint fusion under fluoroscopy (Transloc)  Surgeon: Nathaly Cantrell MD  Preoperative Diagnosis: left sacroiliitis   Postoperative diagnosis:  left sacroiliitis   Anesthesia: MAC  Fluids: 500 ml LR  EBL: 10 cc   Complications: None     Indication: Patient has history of chronic left sacroiliitis, which failed conservative management and  "therapeutic sacroiliac joint (SIJ) injection. Pt underwent diagnostic left SIJ block with >75% relief on two separate occasions before symptoms recurred. The risks, indications, limitations, and alternatives to this surgery were discussed with the patient and/or family. Pt acknowledged understanding of pre-existing conditions that may increase perioperative risk, including, but not limited to, bleeding and infection. Patient then provided informed consent to proceed with procedure.       Description of procedure: The patient was placed on the operating table in the prone position. Anesthesiologist present for time-out and provided MAC for procedure. See anesthesiologist record for sedation details and VS. 2 g Ancef given as pre-operative infection prophylaxis. The lumbosacral region was prepped with chlorhexidine solution and sterile drapes were positioned. Betadine-impregnated drape was placed directly over the surgical field.      The superior aspect of the left joint was identified on contralateral oblique with cranial tilt. A sacral outlet view of the sacrum was obtained, the left PSIS palpated and marked off, and lines were drawn on the skin in line overlying the S1 and S2 neuroforamen. A 22 G 3.5\" Quinke spinal needle was utilized to anesthetize the PSIS periosteum with 5 ml 1% lidocaine with 1:200k epinephrine. Additional 5 ml lidocaine used to anesthetize the skin. A #15 blade was used to make an incision. Electrocautery was used to achieve hemostasis and dissect superficial soft tissue.  A jamshidi needle was placed through the incision in a cranial to caudal and lateral to medial trajectory using a mallet under intermittent fluoroscopy to its stop depth. Trochar depth was confirmed under a sacral inlet view to a depth that did not breach the anterior sacral cortex. This process was also verified with AP/lateral views for safety. The guidewire was placed through the trochar, and the needle removed over the " wire. The ilium and sacral cortex were drilled and decorticated under live fluoroscopy to the 40 mm stop depth with no advancement of the guidewire noted.     A 45 mm length TransLoc screw was selected.     The selected screw implant was placed onto the  and screwed into the created channel until it was flush with the ilium transfixing and compressing the joint.  disengaged from screw.     The procedure was repeated in similar fashion just inferior to the initial screw placement without issue. The second Transloc screw selected was 40 mm. The selected screw implant was placed onto the  and screwed into the created channel until it was flush with the ilium transfixing and compressing the joint.  disengaged from screw. Final inlet, outlet, AP, lateral views saved showing excellent placement.     Hemostasis ensured. The wound was irrigated with 80 mg gentamicin-infused 500 ml saline solution. The surgery was concluded by closing fascial layer with intermittent 2.0 Vicryl sutures. The skin was closed with a running 4.0 Monocryl suture, Dermabond. Sterile dressing was applied with Steri-strips, sterile gauze, and Tegaderm.       Disposition:    The patient was taken to the recovery room in satisfactory condition for recovery. Discharge instructions given to patient as well as in office follow-up. Pt discharged from PACU in stable condition and ambulated out of recovery without issue with responsible adult to drive them home.        Nathaly Cantrell MD  Anesthesiologist & Interventional Pain Physician   [facility] [place]  O: [phone]  F: [phone]  11:22 AM  07/01/25

## 2025-07-01 NOTE — ANESTHESIA PREPROCEDURE EVALUATION
Patient: Todd Small    Procedure Information       Date/Time: 07/01/25 1000    Procedure: Sacroiliac Joint Fusion Left Side - C-ARM, ESTELA - TRANSLOC    Location: [facility] / [facility]    Surgeons: Nathaly Cantrell MD            Relevant Problems   Anesthesia (within normal limits)      Cardiac   (+) CHF (congestive heart failure) (Cardiac clearance obtained prior to last procedure 3/25  An echocardiogram identified severe left ventricular systolic dysfunction with an ejection fraction of 20 to 25%, this is increased to 35% on the most recent study. He underwent coronary angiography to reveal normal coronary arteries. He was started on guideline directed medical therapy as well as an oral diuretic. The thought was that his dilated cardiomyopathy could be secondary to alcohol abuse.            Sx have been stable       )   (+) HLD (hyperlipidemia)   (+) Hypertension      Pulmonary  Quit smoking 3 yrs ago      Neuro   (+) Anxiety   (+) Depression   (+) Depressive disorder   (+) Disorder of right cervical nerve root   (+) Generalized anxiety disorder   (+) Right cervical radiculopathy      GI (within normal limits)      /Renal (within normal limits)      Liver (within normal limits)      Endocrine   (+) Obesity   (+) Postoperative hypothyroidism      Hematology (within normal limits)      Musculoskeletal   (+) Spinal stenosis of lumbar region without neurogenic claudication      HEENT (within normal limits)      ID   (+) Tinea cruris      Skin (within normal limits)      GYN (within normal limits)       Clinical information reviewed:   Tobacco  Allergies  Meds   Med Hx  Surg Hx   Fam Hx  Soc Hx        NPO Detail:  NPO/Void Status  Carbohydrate Drink Given Prior to Surgery? : N  Date of Last Liquid: 07/01/25 (black coffee)  Time of Last Liquid: 0630  Date of Last Solid: 06/30/25  Time of Last Solid: 2345  Last Intake Type: Clear fluids  Time of Last Void: 0800         Physical Exam    Airway  Mallampati:  III  TM distance: >3 FB  Neck ROM: full  Mouth opening: 3 or more finger widths     Cardiovascular   Rhythm: regular     Dental    Pulmonary Breath sounds clear to auscultation     Abdominal (+) obese             Anesthesia Plan    History of general anesthesia?: yes  History of complications of general anesthesia?: no    ASA 3     MAC     The patient is not a current smoker.  Patient was not previously instructed to abstain from smoking on day of procedure.  Patient did not smoke on day of procedure.    intravenous induction   Anesthetic plan and risks discussed with patient.  Use of blood products discussed with patient who consented to blood products.    Plan discussed with CRNA and CAA.

## 2025-07-01 NOTE — POST-PROCEDURE NOTE
1244 Pt brought back to pre-op, vitals stable    1249 Pt provided with a snack, tolerating it well    1305 discharge instructions given to pt, pt and family member verbalized understanding    1310 Vitals retaken and stable, pt sat at the side of the bed and felt fine     1315 IV taken out, belongings secured, pt changing     1330 pt using the restroom, family member went to grab the car   Pt in transport   1336 pt discharged and brought off the unit and brought to the car in a wheelchair

## 2025-07-01 NOTE — ANESTHESIA POSTPROCEDURE EVALUATION
Patient: Todd Small    Procedure Summary       Date: 07/01/25 Room / Location: [facility] / [facility]    Anesthesia Start: 0955 Anesthesia Stop: 1126    Procedure: Sacroiliac Joint Fusion Left Side Diagnosis:       Sacroiliitis, not elsewhere classified      (Sacroiliitis, not elsewhere classified [M46.1])    Surgeons: Nathaly Cantrell MD Responsible Provider: Clarice Aguilar MD MPH    Anesthesia Type: MAC ASA Status: 3            Anesthesia Type: MAC    Vitals Value Taken Time   BP 92/71 07/01/25 12:41   Temp 36.3 °C (97.3 °F) 07/01/25 12:30   Pulse 63 07/01/25 12:40   Resp 13 07/01/25 12:40   SpO2 96 % 07/01/25 12:40   Vitals shown include unfiled device data.    Anesthesia Post Evaluation    Patient location during evaluation: PACU  Patient participation: complete - patient participated  Level of consciousness: awake and alert  Pain score: 5  Pain management: satisfactory to patient  Multimodal analgesia pain management approach  Airway patency: patent  Two or more strategies used to mitigate risk of obstructive sleep apnea  Cardiovascular status: acceptable  Respiratory status: acceptable  Hydration status: acceptable  Postoperative Nausea and Vomiting: none        There were no known notable events for this encounter.

## 2025-07-02 NOTE — PATIENT COMMUNICATION
Called patient back who described stable, but persistent pre-op leg pain and uncontrolled left low back incisional pain. Taking percocet 7.5 mg Q4H PRN with mild relief. Denies SE. He just took 1 tablet 1 hour ago with mild relief. Advised pt to take additional tablet and reach back out in an hour to let me know if the increase in pain medication helps. Pt expressed understanding. All questions answered.      Nathaly Cantrell MD  Anesthesiologist & Interventional Pain Physician   [facility] [place]  O: [phone]  F: [phone]  1:12 PM  07/02/25

## 2025-07-09 RX ORDER — OXYCODONE AND ACETAMINOPHEN 7.5; 325 MG/1; MG/1
1 TABLET ORAL EVERY 4 HOURS PRN
Qty: 14 TABLET | Refills: 0 | Status: SHIPPED | OUTPATIENT
Start: 2025-07-09 | End: 2025-07-10 | Stop reason: ALTCHOICE

## 2025-07-10 ENCOUNTER — APPOINTMENT (OUTPATIENT)
Facility: CLINIC | Age: 60
End: 2025-07-10
Payer: COMMERCIAL

## 2025-07-10 ENCOUNTER — OFFICE VISIT (OUTPATIENT)
Facility: CLINIC | Age: 60
End: 2025-07-10
Payer: COMMERCIAL

## 2025-07-10 VITALS
HEART RATE: 78 BPM | RESPIRATION RATE: 24 BRPM | BODY MASS INDEX: 32.9 KG/M2 | SYSTOLIC BLOOD PRESSURE: 136 MMHG | DIASTOLIC BLOOD PRESSURE: 88 MMHG | WEIGHT: 235 LBS | OXYGEN SATURATION: 99 % | HEIGHT: 71 IN

## 2025-07-10 DIAGNOSIS — M46.1 SACROILIITIS, NOT ELSEWHERE CLASSIFIED: ICD-10-CM

## 2025-07-10 DIAGNOSIS — G89.18 ACUTE POST-OPERATIVE PAIN: Primary | ICD-10-CM

## 2025-07-10 PROCEDURE — 3075F SYST BP GE 130 - 139MM HG: CPT | Performed by: NURSE PRACTITIONER

## 2025-07-10 PROCEDURE — 3079F DIAST BP 80-89 MM HG: CPT | Performed by: NURSE PRACTITIONER

## 2025-07-10 PROCEDURE — 1036F TOBACCO NON-USER: CPT | Performed by: NURSE PRACTITIONER

## 2025-07-10 PROCEDURE — 99211 OFF/OP EST MAY X REQ PHY/QHP: CPT | Performed by: NURSE PRACTITIONER

## 2025-07-10 PROCEDURE — 3008F BODY MASS INDEX DOCD: CPT | Performed by: NURSE PRACTITIONER

## 2025-07-10 RX ORDER — OXYCODONE AND ACETAMINOPHEN 10; 325 MG/1; MG/1
1 TABLET ORAL EVERY 6 HOURS PRN
Qty: 28 TABLET | Refills: 0 | Status: SHIPPED | OUTPATIENT
Start: 2025-07-11 | End: 2025-07-18

## 2025-07-10 ASSESSMENT — ENCOUNTER SYMPTOMS
ABDOMINAL DISTENTION: 0
CONFUSION: 0
DIFFICULTY URINATING: 0
DIARRHEA: 0
CONSTITUTIONAL NEGATIVE: 1
COUGH: 0
NEUROLOGICAL NEGATIVE: 1
BACK PAIN: 1
VOMITING: 0
HEMATOLOGIC/LYMPHATIC NEGATIVE: 1
SHORTNESS OF BREATH: 0
AGITATION: 0
SLEEP DISTURBANCE: 0
NAUSEA: 0
PALPITATIONS: 0
ABDOMINAL PAIN: 0
DYSURIA: 0

## 2025-07-10 ASSESSMENT — PAIN SCALES - GENERAL
PAINLEVEL_OUTOF10: 7
PAINLEVEL_OUTOF10: 7

## 2025-07-10 ASSESSMENT — PAIN DESCRIPTION - DESCRIPTORS: DESCRIPTORS: ACHING;BURNING

## 2025-07-10 ASSESSMENT — PATIENT HEALTH QUESTIONNAIRE - PHQ9
2. FEELING DOWN, DEPRESSED OR HOPELESS: NOT AT ALL
1. LITTLE INTEREST OR PLEASURE IN DOING THINGS: NOT AT ALL
SUM OF ALL RESPONSES TO PHQ9 QUESTIONS 1 & 2: 0

## 2025-07-10 ASSESSMENT — PAIN - FUNCTIONAL ASSESSMENT: PAIN_FUNCTIONAL_ASSESSMENT: 0-10

## 2025-07-10 NOTE — PROGRESS NOTES
Subjective   Patient ID: Todd Small is a 60 y.o. male who presents for Back Pain.  HPI  Patient is here for a follow up post SI fusion on the left. He states his pain was severe immediately after. He is still in quite a bit of pain. He is wondering what to do about his injection.         Review of Systems   Constitutional: Negative.    HENT: Negative.     Respiratory:  Negative for cough and shortness of breath.    Cardiovascular:  Negative for chest pain, palpitations and leg swelling.   Gastrointestinal:  Negative for abdominal distention, abdominal pain, diarrhea, nausea and vomiting.   Endocrine: Negative for cold intolerance and heat intolerance.   Genitourinary:  Negative for difficulty urinating, dysuria and urgency.   Musculoskeletal:  Positive for back pain.   Skin: Negative.    Neurological: Negative.    Hematological: Negative.    Psychiatric/Behavioral:  Negative for agitation, confusion, sleep disturbance and suicidal ideas.        Objective   Physical Exam  Constitutional:       Appearance: Normal appearance.   Musculoskeletal:      Lumbar back: Tenderness present. Decreased range of motion.      Comments: Steri strips were coming off  so they were removed. Incision was dry however was slightly open.  No drainage no erythema. DSD placed.    Neurological:      Mental Status: He is alert.   Psychiatric:         Mood and Affect: Mood normal.         Behavior: Behavior normal.         Assessment/Plan   Problem List Items Addressed This Visit           ICD-10-CM       Musculoskeletal and Injuries    Sacroiliitis, not elsewhere classified M46.1     Other Visit Diagnoses         Codes      Acute post-operative pain    -  Primary G89.18    Relevant Medications    oxyCODONE-acetaminophen (Percocet)  mg tablet (Start on 7/11/2025)             I nice discussion with the patient today our plan will be as follows.    Radiology: no new imaging needed at this time.     Physically:  He is very limited due to  pain at this time.     Psychologically:  no concern.     Medication: PDMP reviewed. Discussed with Dr. Billy and we will increase to 10mg QID for 7 days for acute post op pain.     Duration:  chronic on going.     Intervention:  Dr. Billy to room to assess incision. DSD placed and patient will return in a week to assess healing and change dressing. Follow up 1 week      IRINEO Friedman 07/10/25 9:28 AM

## 2025-07-16 ENCOUNTER — OFFICE VISIT (OUTPATIENT)
Facility: CLINIC | Age: 60
End: 2025-07-16
Payer: COMMERCIAL

## 2025-07-16 VITALS
HEIGHT: 71 IN | RESPIRATION RATE: 16 BRPM | SYSTOLIC BLOOD PRESSURE: 138 MMHG | WEIGHT: 235 LBS | BODY MASS INDEX: 32.9 KG/M2 | DIASTOLIC BLOOD PRESSURE: 90 MMHG

## 2025-07-16 DIAGNOSIS — G89.18 ACUTE POST-OPERATIVE PAIN: ICD-10-CM

## 2025-07-16 DIAGNOSIS — M48.061 SPINAL STENOSIS OF LUMBAR REGION WITHOUT NEUROGENIC CLAUDICATION: ICD-10-CM

## 2025-07-16 DIAGNOSIS — M54.16 LUMBAR RADICULOPATHY: ICD-10-CM

## 2025-07-16 DIAGNOSIS — G89.29 OTHER CHRONIC PAIN: ICD-10-CM

## 2025-07-16 DIAGNOSIS — M46.1 SACROILIITIS, NOT ELSEWHERE CLASSIFIED: Primary | ICD-10-CM

## 2025-07-16 PROCEDURE — 3080F DIAST BP >= 90 MM HG: CPT | Performed by: NURSE PRACTITIONER

## 2025-07-16 PROCEDURE — 99214 OFFICE O/P EST MOD 30 MIN: CPT | Performed by: NURSE PRACTITIONER

## 2025-07-16 PROCEDURE — 3075F SYST BP GE 130 - 139MM HG: CPT | Performed by: NURSE PRACTITIONER

## 2025-07-16 PROCEDURE — 3008F BODY MASS INDEX DOCD: CPT | Performed by: NURSE PRACTITIONER

## 2025-07-16 RX ORDER — OXYCODONE AND ACETAMINOPHEN 7.5; 325 MG/1; MG/1
1 TABLET ORAL EVERY 6 HOURS PRN
Qty: 120 TABLET | Refills: 0 | Status: SHIPPED | OUTPATIENT
Start: 2025-07-17 | End: 2025-08-16

## 2025-07-16 ASSESSMENT — PATIENT HEALTH QUESTIONNAIRE - PHQ9
SUM OF ALL RESPONSES TO PHQ9 QUESTIONS 1 AND 2: 0
2. FEELING DOWN, DEPRESSED OR HOPELESS: NOT AT ALL
1. LITTLE INTEREST OR PLEASURE IN DOING THINGS: NOT AT ALL

## 2025-07-16 ASSESSMENT — PAIN DESCRIPTION - DESCRIPTORS: DESCRIPTORS: ACHING

## 2025-07-16 ASSESSMENT — PAIN SCALES - GENERAL
PAINLEVEL_OUTOF10: 7
PAINLEVEL_OUTOF10: 7

## 2025-07-16 ASSESSMENT — ENCOUNTER SYMPTOMS
NAUSEA: 0
DYSURIA: 0
PALPITATIONS: 0
CONFUSION: 0
AGITATION: 0
SLEEP DISTURBANCE: 0
HEMATOLOGIC/LYMPHATIC NEGATIVE: 1
ABDOMINAL DISTENTION: 0
COUGH: 0
DIARRHEA: 0
CONSTITUTIONAL NEGATIVE: 1
DIFFICULTY URINATING: 0
BACK PAIN: 1
ABDOMINAL PAIN: 0
SHORTNESS OF BREATH: 0
NEUROLOGICAL NEGATIVE: 1
VOMITING: 0

## 2025-07-16 ASSESSMENT — PAIN - FUNCTIONAL ASSESSMENT: PAIN_FUNCTIONAL_ASSESSMENT: 0-10

## 2025-07-16 NOTE — PROGRESS NOTES
Subjective   Patient ID: Todd Small is a 60 y.o. male who presents for Back Pain.  HPI  Patient is here for a follow up and refill for his lumbar and SI joint pain. He is recovering from his SI fusion. He is doing better than last week. His pain is a little better. He states the 10mg percocet was helpful however it is making him a little groggy. He would like to decrease. He is moving a little better. His wife has been changing his injection.     Review of Systems   Constitutional: Negative.    HENT: Negative.     Respiratory:  Negative for cough and shortness of breath.    Cardiovascular:  Negative for chest pain, palpitations and leg swelling.   Gastrointestinal:  Negative for abdominal distention, abdominal pain, diarrhea, nausea and vomiting.   Endocrine: Negative for cold intolerance and heat intolerance.   Genitourinary:  Negative for difficulty urinating, dysuria and urgency.   Musculoskeletal:  Positive for back pain.   Skin: Negative.    Neurological: Negative.    Hematological: Negative.    Psychiatric/Behavioral:  Negative for agitation, confusion, sleep disturbance and suicidal ideas.        Objective   Physical Exam  Constitutional:       Appearance: Normal appearance.     Skin:     General: Skin is warm and dry.      Comments: Incision dry and intact. Scabbing over. No signs of infection. Dressing removed.      Neurological:      Mental Status: He is alert and oriented to person, place, and time.      Sensory: Sensory deficit present.      Motor: Weakness present.      Gait: Gait abnormal.     Psychiatric:         Mood and Affect: Mood normal.         Behavior: Behavior normal.         Assessment/Plan   Problem List Items Addressed This Visit           ICD-10-CM       Musculoskeletal and Injuries    Sacroiliitis, not elsewhere classified - Primary M46.1       Neuro    Chronic pain G89.29    Spinal stenosis of lumbar region without neurogenic claudication M48.061     Other Visit Diagnoses          Codes      Acute post-operative pain     G89.18      Lumbar radiculopathy     M54.16    Relevant Medications    oxyCODONE-acetaminophen (Percocet) 7.5-325 mg tablet (Start on 7/17/2025)             I nice discussion with the patient today our plan will be as follows.    Radiology: no new imaging needed at this time.     Physically:  very limited due to pain at this time.     Psychologically:  no concern.     Medication: I will refill the patient's opioids today for 1 month.  We will decrease to 7.5mg QID.   The patient continues to see benefit and improvement in their quality of life and ability to maintain ADLs.  Patient educated about the risks of taking opioids and operating a motor vehicle.  Patient reports no adverse side effects to current medication regimen.  Current regimen does allow patient to maintain ADLs.  Patient reports no new neurologic symptoms, new pain areas, or exacerbation in pain today.  Patient reports they are happy with current treatment care path.    [place] was reviewed and was consistent with the history.    Patient has been educated on the risks, benefits, and alternatives of controlled substances as well as the proper way to store these medications.  The patient and I discussed the nature of this medication and its side effects.  We discussed tolerance, physical dependence, psychological dependence, addiction and opioid-induced hyperalgesia.  We discussed the potential need to wean from this medication.  We discussed the availability of programs that can help with this process if necessary.  We discussed safety issues related to opioids including safe storage.  We discussed the fact that the patient should not drive an automobile or operate heavy machinery while taking this medication.  A prescription for naloxone was offered to the patient.  The patient will be re-evaluated for the need to continue opioid therapy in 60-90 days.      Duration:  chronic on going.     Intervention:   Recovering from his procedure.       Marlee Pardo, SERVANDO-CNP 07/16/25 8:40 AM

## 2025-07-16 NOTE — PROGRESS NOTES
MEDICATION NAME: PERCOCET  STRENGTH: 10/325MG  LAST FILL DATE: 25  DATE LAST TAKEN: 25  QUANTITY FILLED: 28  QUANTITY REMAININ  COUNT COMPLETED BY: ADDY JACKSON LPN and KASH MELO RN      UDS COMPLETED2025  CONTROLLED SUBSTANCES AGREEMENT SIGNED  ORT COMPLETED  Modified Osw[employer] disability form filled out annually.

## 2025-07-30 ENCOUNTER — APPOINTMENT (OUTPATIENT)
Dept: PAIN MEDICINE | Facility: CLINIC | Age: 60
End: 2025-07-30
Payer: COMMERCIAL

## 2025-07-30 VITALS — BODY MASS INDEX: 32.78 KG/M2 | RESPIRATION RATE: 18 BRPM | WEIGHT: 235 LBS

## 2025-07-30 DIAGNOSIS — Z79.891 LONG TERM CURRENT USE OF OPIATE ANALGESIC: ICD-10-CM

## 2025-07-30 DIAGNOSIS — Z98.1 S/P FUSION OF SACROILIAC JOINT: ICD-10-CM

## 2025-07-30 DIAGNOSIS — M54.16 LUMBAR RADICULOPATHY: ICD-10-CM

## 2025-07-30 DIAGNOSIS — M48.061 SPINAL STENOSIS OF LUMBAR REGION WITHOUT NEUROGENIC CLAUDICATION: Primary | ICD-10-CM

## 2025-07-30 PROCEDURE — 99214 OFFICE O/P EST MOD 30 MIN: CPT | Performed by: ANESTHESIOLOGY

## 2025-07-30 PROCEDURE — 99024 POSTOP FOLLOW-UP VISIT: CPT | Performed by: ANESTHESIOLOGY

## 2025-07-30 RX ORDER — OXYCODONE AND ACETAMINOPHEN 7.5; 325 MG/1; MG/1
1 TABLET ORAL 4 TIMES DAILY PRN
Qty: 120 TABLET | Refills: 0 | Status: SHIPPED | OUTPATIENT
Start: 2025-08-16 | End: 2025-09-15

## 2025-07-30 ASSESSMENT — PAIN SCALES - GENERAL
PAINLEVEL_OUTOF10: 8
PAINLEVEL_OUTOF10: 7

## 2025-07-30 ASSESSMENT — ENCOUNTER SYMPTOMS
GASTROINTESTINAL NEGATIVE: 1
ENDOCRINE NEGATIVE: 1
HEMATOLOGIC/LYMPHATIC NEGATIVE: 1
BACK PAIN: 1
PSYCHIATRIC NEGATIVE: 1
RESPIRATORY NEGATIVE: 1
WEAKNESS: 1
NUMBNESS: 1
EYES NEGATIVE: 1
CONSTITUTIONAL NEGATIVE: 1
CARDIOVASCULAR NEGATIVE: 1

## 2025-07-30 ASSESSMENT — PAIN - FUNCTIONAL ASSESSMENT: PAIN_FUNCTIONAL_ASSESSMENT: 0-10

## 2025-07-30 ASSESSMENT — PAIN DESCRIPTION - DESCRIPTORS: DESCRIPTORS: SORE;ACHING

## 2025-07-30 NOTE — PROGRESS NOTES
PAIN MANAGEMENT FOLLOW-UP OFFICE NOTE    Date of Service: 7/30/2025    SUBJECTIVE    CHIEF COMPLAINT: LBP    HISTORY OF PRESENT ILLNESS    Todd Small is a 60 y.o. male with PMH CHF 2/2 former EtOH use, former smoker, CKD, MDD, papillary thyroid ca s/p thyroidectomy/RT with hypothyroidism who presents for F/U    Since his last visit, pt maintains L-sided LBP with mild improvement since SIJ fusion. His recovery has been confounded by worsening LLE pain since before his surgery. This is associated with RLE numbness worse with standing, working in kitchen, and sleeping.      Pt denies new-onset bowel/bladder incontinence.  Pt denies recent infection, allergy to Latex/iodine/contrast. Patient is currently taking the following blood thinner(s): N/A    Procedure log:  -L SIJ fusion 7/1/25: improving  -R SI fusion 3/20/25: >50% ongoing relief  -BL L3-4 TFESI 3/4/25: >50% ongoing relief.   -Dx BL SIJ injxn #2 1/7/25: 100% relief  -BL L3-4 TFESI 11/12/24: 70% relief 3 mo  -Dx BL SIJ injxn #1 10/29/24: 100% relief 3 d  -BL SIJ  CSI 9/3/24: 100% relief 3 hours before return to baseline   -BL L3-4 TFESI 7/30/24: 50% relief in his legs, but 30% relief in LB      REVIEW OF SYSTEMS  Review of Systems   Constitutional: Negative.    HENT: Negative.     Eyes: Negative.    Respiratory: Negative.     Cardiovascular: Negative.    Gastrointestinal: Negative.    Endocrine: Negative.    Musculoskeletal:  Positive for back pain.   Skin: Negative.    Neurological:  Positive for weakness and numbness.   Hematological: Negative.    Psychiatric/Behavioral: Negative.         PAST MEDICAL HISTORY  Past Medical History:   Diagnosis Date    Angina pectoris 2022    Anxiety 4/01/23    Back pain     Cancer (Multi) 10/01/0-    CHF (congestive heart failure) 10/01/23    Chronic pain disorder Not sure    COVID-19 2022    Depression 10/01/23    Extremity pain     Headache     Hernia, internal 2000    Hyperlipidemia     Hypertension     Hypothyroidism      Low back pain     Nonischemic cardiomyopathy (Multi)     Personal history of irradiation 1/10/95    Spinal stenosis 2024    Thyroid cancer (Multi)      Past Surgical History:   Procedure Laterality Date    APPENDECTOMY  10/01/96    BACK SURGERY  7/01/2025    CARDIAC CATHETERIZATION      CIRCUMCISION, PRIMARY      COLONOSCOPY  09/11/2020    HERNIA REPAIR      THYROIDECTOMY      VASECTOMY       Family History   Problem Relation Name Age of Onset    Heart disease Mother Kim     Heart disease Sister (1)     Other (pacemaker) Sister (1)     Heart disease Brother Skye     Atrial fibrillation Brother Gilberto     Hypertension Sister Skye        CURRENT MEDICATIONS  Current Outpatient Medications   Medication Sig Dispense Refill    acetaminophen (Tylenol) 325 mg tablet Take 1 tablet (325 mg) by mouth every 6 hours if needed for mild pain (1 - 3) or moderate pain (4 - 6).      amLODIPine (Norvasc) 5 mg tablet Take 1 tablet (5 mg) by mouth 2 times a day. 180 tablet 3    carvedilol (Coreg) 12.5 mg tablet TAKE 1 TABLET BY MOUTH TWO TIMES A DAY WITH FOOD 180 tablet 3    cetirizine (ZyrTEC) 10 mg chewable tablet Chew and swallow 1 tablet (10 mg) once daily.      cholecalciferol (Vitamin D3) 25 mcg (1000 units) tablet Take 1 tablet (1,000 Units) by mouth once daily.      furosemide (Lasix) 40 mg tablet TAKE ONE TABLET BY MOUTH ONCE A DAY 90 tablet 3    levothyroxine (Synthroid, Levoxyl) 150 mcg tablet Take 1 tablet (150 mcg) by mouth 6 times a week AND 1.5 tablets (225 mcg) 1 (one) time per week. Take on an empty stomach at the same time each day, either 30 to 60 minutes prior to breakfast. 92 tablet 3    naloxone (Narcan) 4 mg/0.1 mL nasal spray Administer 1 spray (4 mg) into affected nostril(s) if needed for opioid reversal. May repeat every 2-3 minutes if needed, alternating nostrils, until medical assistance becomes available. 2 each 0    nystatin (Mycostatin) 100,000 unit/gram powder Apply 1 Application topically 2 times  a day. 60 g 1    oxyCODONE-acetaminophen (Percocet) 7.5-325 mg tablet Take 1 tablet by mouth every 6 hours if needed for severe pain (7 - 10). Do not fill before July 17, 2025. 120 tablet 0    potassium chloride CR (Klor-Con) 10 mEq ER tablet Take 1 tablet (10 mEq) by mouth 2 times a day. 180 tablet 3    sacubitriL-valsartan (Entresto)  mg tablet Take 1 tablet by mouth 2 times a day. 180 tablet 3    venlafaxine XR (Effexor-XR) 75 mg 24 hr capsule Take one capsule by mouth once a day (take with 150mg capsule for a total of 225mg daily) 90 capsule 0    venlafaxine XR (Effexor XR) 37.5 mg 24 hr capsule Take 1 capsule (37.5 mg) by mouth once daily. Do not crush or chew. To be taken with 75 mg capsule for total of 112.5 mg daily 30 capsule 0     No current facility-administered medications for this visit.       ALLERGIES AND DRUG REACTIONS  Allergies   Allergen Reactions    Fluconazole Confusion     PT takes alprazolam as well.  Last fill pt (Jan 25) stated he was disoriented, confused with additional side effects.   Cant not take both together    Latex Rash    Sulfa (Sulfonamide Antibiotics) Rash and Unknown          OBJECTIVE  Visit Vitals  Resp 18   Wt 107 kg (235 lb)   BMI 32.78 kg/m²   Smoking Status Former   BSA 2.31 m²       Last Recorded Pain Score (if available):       Pain Score:   7     Physical Exam  General: Sitting in chair, NAD, antalgic gait with cane   Head: NCAT  Eyes: Sclera/conjunctiva clear, EOMI, PERRL  Nose/mouth: MMM  CV: Good distal pulses  Lungs: Good/equal chest excursion  Abdomen: Soft, ND  Ext: No cyanosis/edema  MSK: L-spine alignment: unremarkable, BL paraspinal m/L>>R PSIS TTP, L-spine ROM: ext limited by pain  L hip: no laxity, leg discrepancy. +Patricks's, pain on internal/external rotation  L knee: NTTP- ROM full w/o laxity    Neuro: AAOx3, CN grossly nl  Dermatome sensation to light touch  LEFT L1 (lower pelvis/upper thigh): WNL    RIGHT L1: WNL      LEFT L2 (upper thigh): WNL        RIGHT: L2:WNL      LEFT L3 (medial knee): WNL       RIGHT L3: WNL      LEFT L4 (superior medial malleolus): WNL       RIGHT L4: WNL      LEFT L5 (dorsal foot): WNL       RIGHT L5: WNL      LEFT S1 (lateral foot): WNL     RIGHT S1: WNL      LEFT S2 (popliteal fossa): WNL    RIGHT S2: WNL        Motor strength  LEFT L2 (hip flexion): 5/5   RIGHT L2: 5/5  LEFT L3 (knee extension): 5/5     RIGHT L3: 5/5  LEFT L4 (dorsiflexion): 5/5     RIGHT L4: 5/5  LEFT L5 (EHL extension): 5/5     RIGHT L5: 5/5  LEFT S1 (plantarflexion): 5/5     RIGHT S1: 5/5  LEFT S2 (knee flexion): 5/5      RIGHT S2: 5/5    Special testing  DTR diminished patellar BL  Seated slump test +LLE    Psych: affect nl, affect nl  Skin: no rash. L SIF scar well-healed      REVIEW OF LABORATORY DATA  I have reviewed the following lab results:  WBC   Date Value Ref Range Status   06/17/2025 9.3 4.4 - 11.3 x10*3/uL Final     RBC   Date Value Ref Range Status   06/17/2025 4.27 (L) 4.50 - 5.90 x10*6/uL Final     Hemoglobin   Date Value Ref Range Status   06/17/2025 13.2 (L) 13.5 - 17.5 g/dL Final     Hematocrit   Date Value Ref Range Status   06/17/2025 39.6 (L) 41.0 - 52.0 % Final     MCV   Date Value Ref Range Status   06/17/2025 93 80 - 100 fL Final     MCH   Date Value Ref Range Status   06/17/2025 30.9 26.0 - 34.0 pg Final     MCHC   Date Value Ref Range Status   06/17/2025 33.3 32.0 - 36.0 g/dL Final     RDW   Date Value Ref Range Status   06/17/2025 11.8 11.5 - 14.5 % Final     Platelets   Date Value Ref Range Status   06/17/2025 290 150 - 450 x10*3/uL Final     MPV   Date Value Ref Range Status   11/22/2022 9.4 7.0 - 12.6 CU Final     Sodium   Date Value Ref Range Status   06/17/2025 135 (L) 136 - 145 mmol/L Final     Potassium   Date Value Ref Range Status   06/17/2025 4.7 3.5 - 5.3 mmol/L Final     Bicarbonate   Date Value Ref Range Status   06/17/2025 26 21 - 32 mmol/L Final     Urea Nitrogen   Date Value Ref Range Status   06/17/2025 10 6 - 23  "mg/dL Final     Calcium   Date Value Ref Range Status   06/17/2025 9.3 8.6 - 10.3 mg/dL Final     No results found for: \"PROTIME\", \"PTT\", \"INR\", \"FIBRINOGEN\"      REVIEW OF RADIOLOGY   I have reviewed the following:  Radiology Studies           MRI L-spine 7/3/24:         ASSESSMENT & PLAN  Todd Small is a 60 y.o. male with PMH CHF 2/2 former EtOH use, former smoker, CKD, MDD, papillary thyroid ca s/p thyroidectomy/RT with hypothyroidism who presents  for F/U    1) Lumbar spinal stenosis  -LBP radiating down BLE with standing/walking affecting gait, function, ADLs, and QoL  -Refractive to Tylenol, biofreeze, SNRI, Norco  -NSAIDs contraindicated 2/2 CKD. MDP may worsen rash  -Unable to meaningfully participate in PT 2/2 pain severity and late-stage CHF  -MRI L-spine 7/3/24:  multilevel spondylosis featuring moderate L3-4 stenosis  -BL L3-4 TFESI 3/4/25: >50% relief 3 mo  -Schedule R L3-4, L4-5 TFESI to target pain generator as seen on imaging and minimize risk/likelihood of chronic opioid use and/or surgery  -Consider new MRI if no improvement    2) Sacroiliitis, improving  -BL SIJ-mediated pain reproduced on multiple SIJ-provocative maneuvers  -Refractive to yrs of conservative tx including Tylenol, biofreeze, Norco, SIJ CSI  -R SI fusion 3/20/25: >50% ongoing relief   -L SIJ fusion 7/1/25: improving    3) Long-term opiate use  -2/2 chronic LB and leg pain refractive to conservative, interventional tx  -On Percocet 7.5 mg QID PRN (45 MME) with moderate relief. Denies SE. Requests RF  - reviewed/appropriate. Reviewed DS 5/14/25: OK. No sign of aberrant behavior   -From 5/14/25: He did fill alprazolam 1 mg x140 tabs from November psych script, which pt says was not to be taken regularly, but only as needed in certain anxiety-provoking situations like flying on a plane. Warning given not to resume benzo as he was already weaned off. He expressed he would bring in his tabs Xanax for destruction.  -Educated " on/prescribed Narcan 8/12/24 2/2 benzo co-prescription  -RF Percocet x30 d  -RTC 1 mo          Discussed procedure risks/benefits in detail with patient. Pt meets medical necessity for procedure due to failure of conservative measures. Reviewed procedural risks including bleeding, infection, nerve damage, paralysis. Also reviewed mitigating factors such as screening for infection/blood thinner use, sterile precautions, and image-guidance when applicable. All questions answered. Pt/guardian expressed understanding and choose to proceed    Today's visit involved continuation of chronic pain care. In the context of the complexity of this patient's chronic pain diagnosis, long-term expectations and care planning discussed. Adequate time taken to ensure patient understanding and answer questions. Imaging studies ordered are placed do elucidate the patient's diagnosis, but also to evaluate the patient's candidacy for procedural and surgical interventions. The risks and benefits of these potential interventions are detailed as above.                    Nathaly Cantrell MD  Anesthesiologist & Interventional Pain Physician   [facility]  O: [phone]  F: [phone]  8:41 AM  07/30/25

## 2025-08-19 ENCOUNTER — ANCILLARY PROCEDURE (OUTPATIENT)
Dept: RADIOLOGY | Facility: EXTERNAL LOCATION | Age: 60
End: 2025-08-19
Payer: COMMERCIAL

## 2025-08-19 DIAGNOSIS — M48.061 SPINAL STENOSIS, LUMBAR REGION WITHOUT NEUROGENIC CLAUDICATION: ICD-10-CM

## 2025-08-19 PROCEDURE — 64484 NJX AA&/STRD TFRM EPI L/S EA: CPT | Performed by: ANESTHESIOLOGY

## 2025-08-19 PROCEDURE — 64483 NJX AA&/STRD TFRM EPI L/S 1: CPT | Performed by: ANESTHESIOLOGY

## 2025-08-22 ENCOUNTER — APPOINTMENT (OUTPATIENT)
Dept: PRIMARY CARE | Facility: CLINIC | Age: 60
End: 2025-08-22
Payer: COMMERCIAL

## 2025-08-27 ENCOUNTER — APPOINTMENT (OUTPATIENT)
Dept: BEHAVIORAL HEALTH | Facility: CLINIC | Age: 60
End: 2025-08-27
Payer: COMMERCIAL

## 2025-09-23 ENCOUNTER — APPOINTMENT (OUTPATIENT)
Facility: CLINIC | Age: 60
End: 2025-09-23
Payer: COMMERCIAL

## 2025-09-25 ENCOUNTER — APPOINTMENT (OUTPATIENT)
Dept: PRIMARY CARE | Facility: CLINIC | Age: 60
End: 2025-09-25
Payer: COMMERCIAL

## 2025-12-12 ENCOUNTER — APPOINTMENT (OUTPATIENT)
Dept: PRIMARY CARE | Facility: CLINIC | Age: 60
End: 2025-12-12
Payer: COMMERCIAL

## (undated) DEVICE — ADHESIVE, SKIN, DERMABOND ADVANCED, 15CM, PEN-STYLE

## (undated) DEVICE — COUNTER, NEEDLE, FOAM BLOCK, POP-N-COUNT, W/BLADEGUARD, W/ADHESIVE 40 COUNT, RED

## (undated) DEVICE — DRAPE, SHEET, LAPAROTOMY, W/ISO-BAC, W/ARMBOARD COVERS, 98 X 122 IN, DISPOSABLE, LF, STERILE

## (undated) DEVICE — NEEDLE, HYPODERMIC, 25 G X 1.5 IN, A BEVEL, STERILE

## (undated) DEVICE — Device

## (undated) DEVICE — DRAPE, SHEET, LARGE, 70 X 85IN, STERILE

## (undated) DEVICE — SUTURE, VICRYL, 2-0, 27 IN, SH, UNDYED

## (undated) DEVICE — STRIP, SKIN CLOSURE, STERI STRIP, REINFORCED, 0.5 X 4 IN

## (undated) DEVICE — GLOVE, SURGICAL, PROTEXIS PI BLUE W/NEUTHERA, 7.5, PF, LF

## (undated) DEVICE — GLOVE, SURGICAL, PROTEXIS PI , 8.0, PF, LF

## (undated) DEVICE — DRESSING, TRANSPARENT, TEGADERM, 4 X 4.5

## (undated) DEVICE — TUBING, SUCTION, 6MM X 10, CLEAN N-COND

## (undated) DEVICE — DRAPE COVER, C ARM, FLOUROSCAN IMAGING SYS

## (undated) DEVICE — SYRINGE, 60 CC, IRRIGATION, BULB, CONTRO-BULB, PAPER POUCH

## (undated) DEVICE — CAUTERY, PENCIL, PUSH BUTTON, SMOKE EVAC, 70MM

## (undated) DEVICE — SOLUTION, IRRIGATION, X RX SODIUM CHL 0.9%, 1000ML BTL

## (undated) DEVICE — DRAPE PACK, BASIC VI, AURORA, 50 X 90IN

## (undated) DEVICE — SUTURE, MONOCRYL, 4-0, 27 IN, PS-2, UNDYED

## (undated) DEVICE — SUTURE, MONOCRYL, 3-0, 27 IN, PS-2, UNDYED

## (undated) DEVICE — GLOVE, SURGICAL, PROTEXIS PI , 7.0, PF, LF

## (undated) DEVICE — APPLICATOR, CHLORAPREP, W/ORANGE TINT, 26ML

## (undated) DEVICE — 35CM X 35CM 3M IOBAN 2 ANTIMICROBIAL INCISE DRAPE (EA/1)

## (undated) DEVICE — MARKER, SURGICAL, SKIN, REG TIP, W/ RULER & LABELS

## (undated) DEVICE — SPONGE GAUZE, XRAY RFD, 8X4 12 PLY

## (undated) DEVICE — COVER, LIGHT HANDLE, FLEX, SOFT, PK/1

## (undated) DEVICE — SUTURE, ETHILON, 3-0, 18 IN, PS1, BLACK

## (undated) DEVICE — DRAPE, INCISE, ANTIMICROBIAL, IOBAN 2, 13 X 13 IN, DISPOSABLE, STERILE

## (undated) DEVICE — DRAPE, C-ARM IMAGE

## (undated) DEVICE — TIP, SUCTION, YANKAUER, RIGID, TIP-TROL

## (undated) DEVICE — DRESSING, GAUZE, SPONGE, VERSALON, 4 PLY, 2 X 2 IN, STERILE

## (undated) DEVICE — NEEDLE, SPINAL, 22 G X 3.5 IN, BLACK HUB